# Patient Record
Sex: MALE | Race: WHITE | NOT HISPANIC OR LATINO | Employment: OTHER | URBAN - METROPOLITAN AREA
[De-identification: names, ages, dates, MRNs, and addresses within clinical notes are randomized per-mention and may not be internally consistent; named-entity substitution may affect disease eponyms.]

---

## 2018-04-27 ENCOUNTER — TELEPHONE (OUTPATIENT)
Dept: SCHEDULING | Facility: CLINIC | Age: 73
End: 2018-04-27

## 2018-04-27 NOTE — TELEPHONE ENCOUNTER
Pt calling to see if he needs to have labs done before his appt on 5/15? Please call pt to make aware.

## 2018-05-01 ENCOUNTER — APPOINTMENT (OUTPATIENT)
Dept: LAB | Facility: CLINIC | Age: 73
End: 2018-05-01
Attending: INTERNAL MEDICINE
Payer: MEDICARE

## 2018-05-01 ENCOUNTER — TRANSCRIBE ORDERS (OUTPATIENT)
Dept: LAB | Facility: CLINIC | Age: 73
End: 2018-05-01

## 2018-05-01 DIAGNOSIS — E78.2 MIXED HYPERLIPIDEMIA: Primary | ICD-10-CM

## 2018-05-01 DIAGNOSIS — E78.2 MIXED HYPERLIPIDEMIA: ICD-10-CM

## 2018-05-01 LAB
CHOLEST SERPL-MCNC: 197 MG/DL
HDLC SERPL-MCNC: 76 MG/DL
HDLC SERPL: 2.6 {RATIO}
LDLC SERPL CALC-MCNC: 107 MG/DL
NONHDLC SERPL-MCNC: 121 MG/DL
TRIGL SERPL-MCNC: 70 MG/DL (ref 30–149)

## 2018-05-01 PROCEDURE — 80061 LIPID PANEL: CPT

## 2018-05-01 PROCEDURE — 36415 COLL VENOUS BLD VENIPUNCTURE: CPT

## 2018-05-14 PROBLEM — I10 HTN (HYPERTENSION) WITH GOAL TO BE DETERMINED: Status: ACTIVE | Noted: 2018-05-14

## 2018-05-14 PROBLEM — Z15.89 MTHFR GENE MUTATION: Status: ACTIVE | Noted: 2018-05-14

## 2018-05-14 PROBLEM — E78.00 HYPERCHOLESTEROLEMIA: Status: ACTIVE | Noted: 2018-05-14

## 2018-05-14 PROBLEM — E66.9 OBESITY: Status: ACTIVE | Noted: 2018-05-14

## 2018-05-14 PROBLEM — I25.10 CAD (CORONARY ARTERY DISEASE): Status: ACTIVE | Noted: 2018-05-14

## 2018-05-14 PROBLEM — E88.819 INSULIN RESISTANCE: Status: ACTIVE | Noted: 2018-05-14

## 2018-05-14 NOTE — PROGRESS NOTES
Patient MRN: [id number]  Date: 2017  Description: Prog Notes ([facility]) Cardiology  Category: Progress Notes ([facility])   Provider ID: [id number]  Practice ID: 0001  [place] ID: [id number]    5/15/2018      Rai Lopez M.D.  [address] [place] [address]  [place] [address]    Re:  CARROLL LITTLE  :  1945    Dear Rai:    It was my pleasure to see Carroll Little in the cardiovascular office today.  As you know, he self-referred for cardiovascular risk assessment.  The patient carries a history of subclinical coronary artery disease with an Agatston score of 648 with two vessel involvement, hypertension and polygenic hypercholesterolemia.    Clinically, he has been doing well.  He denies any chest pain, shortness of breath or palpitations.    We reviewed his prevention program in detail.  His last advanced lipid panel revealed a total cholesterol of 197  HDL 76 triglycerides 70.  Presently, he is on Crestor 10 mg Monday, Wednesday, Friday.      His advanced lipid panel also shows an elevated uric acid level at 80.  He does not have gout.  His homocystine levels are high and his RBC folate levels are low.  He will begin L5 methyl folate with B-complex.  He will continue on with omega-3 fish oil.    There is evidence of insulin resistance.  We recommended a weight reduction diet and his weight is down 12 pounds to 229 pounds.  His goal weight is 220 pounds.  We discussed a low-carbohydrate, low-saturated fat Mediterranean Diet and to cut back on his carbohydrates, both sugar as well as saturated fats.    PAST MEDICAL HISTORY:  Subclinical coronary artery disease, coronary calcium score 648 with two vessel involvement including the LAD and RCA, 2016, dyslipidemia consistent with mild polygenic hypercholesterolemia, total cholesterol 198, , HDL 65, triglycerides 52, apoB 92, LDL-P 1496, normal LP(a), MTHFR polymorphism with hyperhomocystinemia, endothelial dysfunction, insulin  resistance, hypertension, abnormal EKG with incomplete right bundle branch block, frequent PVC's, subdural hematoma in  complicated by paroxysmal atrial fibrillation.  Stress echo was performed in 2016 which was negative for myocardial ischemia or scar, normal left ventricular size, preserved systolic function.  He has a slightly dilated aortic root at 4.4 cm.    PAST SURGICAL HISTORY:  Neurosurgery for subdural hematoma, appendectomy.    CURRENT MEDICATIONS:   Current Outpatient Prescriptions:   •  cholecalciferol, vitamin D3, (VITAMIN D3 ORAL), Take 1 tablet by mouth daily. Vitamin D Supreme, Disp: , Rfl:   •  coenzyme Q10 (CO Q-10) 10 mg capsule, Take 200 mg by mouth daily.  , Disp: , Rfl:   •  latanoprost (XALATAN) 0.005 % ophthalmic solution, Administer 1 drop into affected eye(s) nightly. Into left eye, Disp: , Rfl: 6  •  lisinopril-hydrochlorothiazide (PRINZIDE,ZESTORETIC) 20-12.5 mg per tablet, Take 1 tablet by mouth daily., Disp: , Rfl:   •  metoprolol succinate XL (TOPROL-XL) 100 mg 24 hr tablet, Take 100 mg by mouth daily. Take 0.5 tablet daily, Disp: , Rfl: 3  •  omega-3/dha/epa/fish oil (OMEGA-3 FISH OIL ORAL), Take 1 capsule by mouth daily. Omeg Q-Plus, Disp: , Rfl:   •  rosuvastatin (CRESTOR) 10 mg tablet, Take 1 tablet (10 mg total) by mouth daily., Disp: 90 tablet, Rfl: 1      SUPPLEMENTS:  Coenzyme-Q10 100 mg daily, vitamin-D 4966-7026 international units daily, omega-3 fish oil 1000 mg daily, multiple vitamin.    ALLERGIES:  No known drug allergies.    FAMILY HISTORY:  Father  young at age 55 of lung cancer.  Mother has a pacemaker.    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  His wife's name is Jaye.  They have three children.  He has seven grandchildren.  He smoked 38 years ago for five years.  He rarely drinks alcohol.  He has an active lifestyle.  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    REVIEW OF SYSTEMS:  The patient denies any chest pain, shortness of breath or palpitations.    PHYSICAL EXAMINATION:    Weight:  229.  Blood pressure: 140/82  Heart rate:  72.  Respiratory rate:  12.  Temperature: Afebrile.  HEENT:  Unremarkable.  Neck:  Supple, no JVD.  Lungs:  Clear to auscultation and percussion.  Cardiac:  Regular rate and rhythm.  Abdomen:  Soft, bowel sounds present, no organomegaly.  Extremities:  No edema, pulses intact.  Skin:  Warm and dry.  Neuro:  Alert and oriented X 3.    LABORATORY DATA:      EKG:  Normal sinus rhythm, normal EKG.      Coronary calcium score:  648 with two vessel involvement.      Stress echo was negative for myocardial ischemia or scar, normal left ventricular size, preserved systolic function.      Lipid panel:  Total cholesterol 197  HDL 76 triglycerides 70.    IMPRESSIONS/RECOMMENDATIONS:  1. Coronary artery disease.  The patient's stress test was negative for ischemia.  Continue baby aspirin 81 mg daily.  2. Hypertension.  Continue lisinopril, hydrochlorothiazide and metoprolol.  3. Polygenic hypercholesterolemia.  The patient will increase his Crestor to 10 mg Monday, Wednesday, Friday.  Our goal is an apoB less than 80 and an LDL below 100.  4. MTHFR polymorphism.  Begin B-complex with L5 methyl folate.  5. Insulin resistance.  The patient needs to lose 15 pounds.  He needs to cut back on his carbohydrates.  6. Obesity.  The patient needs to lose 15 pounds.  We discussed a low-carbohydrate, low-saturated fat Mediterranean Diet and an exercise walking program.    Summary: Tobias is making progress with his weight management program needs to lose another 10 pounds.  With Crestor 10 mg Monday Wednesday Friday.  We will see him back in the office in 6 months.    This was a 30 minute patient encounter with greater than 50% time spent in care coordination and counseling.    Sincerely,    Manuel Nichole MD  5/15/2018      cc: Rai ADHIKARI  ARELI Lopez., [address] [place] [address], [place] [address], FAX: [phone]

## 2018-05-15 ENCOUNTER — OFFICE VISIT (OUTPATIENT)
Dept: CARDIOLOGY | Facility: CLINIC | Age: 73
End: 2018-05-15
Attending: INTERNAL MEDICINE
Payer: MEDICARE

## 2018-05-15 VITALS
DIASTOLIC BLOOD PRESSURE: 82 MMHG | BODY MASS INDEX: 32.06 KG/M2 | HEIGHT: 71 IN | WEIGHT: 229 LBS | SYSTOLIC BLOOD PRESSURE: 140 MMHG

## 2018-05-15 DIAGNOSIS — I25.10 CORONARY ARTERY DISEASE INVOLVING NATIVE CORONARY ARTERY OF NATIVE HEART WITHOUT ANGINA PECTORIS: Primary | ICD-10-CM

## 2018-05-15 DIAGNOSIS — E78.00 HYPERCHOLESTEROLEMIA: ICD-10-CM

## 2018-05-15 DIAGNOSIS — E88.819 INSULIN RESISTANCE: ICD-10-CM

## 2018-05-15 DIAGNOSIS — I10 HTN (HYPERTENSION) WITH GOAL TO BE DETERMINED: ICD-10-CM

## 2018-05-15 PROCEDURE — 99214 OFFICE O/P EST MOD 30 MIN: CPT | Performed by: INTERNAL MEDICINE

## 2018-05-15 RX ORDER — ROSUVASTATIN CALCIUM 5 MG/1
5 TABLET, COATED ORAL DAILY
Qty: 90 TABLET | Refills: 3 | Status: CANCELLED | OUTPATIENT
Start: 2018-05-15 | End: 2019-05-15

## 2018-05-15 RX ORDER — METOPROLOL SUCCINATE 100 MG/1
100 TABLET, EXTENDED RELEASE ORAL DAILY
Refills: 3 | COMMUNITY
Start: 2018-05-06 | End: 2024-12-16 | Stop reason: SDUPTHER

## 2018-05-15 RX ORDER — LISINOPRIL AND HYDROCHLOROTHIAZIDE 12.5; 2 MG/1; MG/1
1 TABLET ORAL DAILY
COMMUNITY
Start: 2017-11-09 | End: 2019-02-27 | Stop reason: ALTCHOICE

## 2018-05-15 RX ORDER — ROSUVASTATIN CALCIUM 5 MG/1
5 TABLET, COATED ORAL DAILY
COMMUNITY
Start: 2016-04-19 | End: 2018-05-15

## 2018-05-15 RX ORDER — ACETAMINOPHEN 160 MG/5ML
200 SUSPENSION, ORAL (FINAL DOSE FORM) ORAL DAILY
COMMUNITY

## 2018-05-15 RX ORDER — ROSUVASTATIN CALCIUM 10 MG/1
10 TABLET, COATED ORAL DAILY
Qty: 90 TABLET | Refills: 1 | Status: SHIPPED | OUTPATIENT
Start: 2018-05-15 | End: 2018-10-11 | Stop reason: SDUPTHER

## 2018-05-15 RX ORDER — LATANOPROST 50 UG/ML
1 SOLUTION/ DROPS OPHTHALMIC NIGHTLY
Refills: 6 | COMMUNITY
Start: 2018-04-02

## 2018-05-15 RX ORDER — ROSUVASTATIN CALCIUM 5 MG/1
10 TABLET, COATED ORAL DAILY
Qty: 90 TABLET | Refills: 2 | COMMUNITY
Start: 2018-05-15 | End: 2018-05-15

## 2018-06-11 ENCOUNTER — APPOINTMENT (OUTPATIENT)
Dept: LAB | Facility: CLINIC | Age: 73
End: 2018-06-11
Attending: FAMILY MEDICINE
Payer: MEDICARE

## 2018-06-11 ENCOUNTER — TRANSCRIBE ORDERS (OUTPATIENT)
Dept: LAB | Facility: CLINIC | Age: 73
End: 2018-06-11

## 2018-06-11 DIAGNOSIS — R97.20 ELEVATED PROSTATE SPECIFIC ANTIGEN (PSA): ICD-10-CM

## 2018-06-11 DIAGNOSIS — E11.9 TYPE 2 DIABETES MELLITUS WITHOUT COMPLICATIONS (CMS/HCC): ICD-10-CM

## 2018-06-11 DIAGNOSIS — E55.0 RICKETS, ACTIVE: ICD-10-CM

## 2018-06-11 DIAGNOSIS — D64.9 ANEMIA: ICD-10-CM

## 2018-06-11 DIAGNOSIS — E78.00 PURE HYPERCHOLESTEROLEMIA: ICD-10-CM

## 2018-06-11 DIAGNOSIS — Z79.01 LONG TERM CURRENT USE OF ANTICOAGULANT: ICD-10-CM

## 2018-06-11 DIAGNOSIS — D64.9 ANEMIA: Primary | ICD-10-CM

## 2018-06-11 LAB
25(OH)D3 SERPL-MCNC: 71 NG/ML (ref 30–100)
ALBUMIN SERPL-MCNC: 4.1 G/DL (ref 3.4–5)
ALP SERPL-CCNC: 53 IU/L (ref 35–126)
ALT SERPL-CCNC: 28 IU/L (ref 16–63)
ANION GAP SERPL CALC-SCNC: 8 MEQ/L (ref 3–15)
APTT PPP: 25 SEC. (ref 23–35)
AST SERPL-CCNC: 21 IU/L (ref 15–41)
BASOPHILS # BLD: 0 K/UL (ref 0.01–0.1)
BASOPHILS NFR BLD: 0 %
BILIRUB SERPL-MCNC: 0.8 MG/DL (ref 0.3–1.2)
BUN SERPL-MCNC: 19 MG/DL (ref 8–20)
CALCIUM SERPL-MCNC: 9.1 MG/DL (ref 8.9–10.3)
CHLORIDE SERPL-SCNC: 101 MMOL/L (ref 98–109)
CHOLEST SERPL-MCNC: 180 MG/DL
CO2 SERPL-SCNC: 29 MMOL/L (ref 22–32)
CREAT SERPL-MCNC: 1.1 MG/DL (ref 0.8–1.3)
DACRYOCYTES BLD QL SMEAR: ABNORMAL
DIFFERENTIAL METHOD BLD: ABNORMAL
EOSINOPHIL # BLD: 0.08 K/UL (ref 0.04–0.54)
EOSINOPHIL NFR BLD: 1 %
ERYTHROCYTE [DISTWIDTH] IN BLOOD BY AUTOMATED COUNT: NORMAL % (ref 11.6–14.4)
EST. AVERAGE GLUCOSE BLD GHB EST-MCNC: 123 MG/DL
FERRITIN SERPL-MCNC: 205 NG/ML (ref 24–250)
GFR SERPL CREATININE-BSD FRML MDRD: >60 ML/MIN/1.73M*2
GLUCOSE SERPL-MCNC: 100 MG/DL (ref 70–99)
HBA1C MFR BLD HPLC: 5.9 %
HCT VFR BLDCO AUTO: NORMAL % (ref 40–51)
HDLC SERPL-MCNC: 75 MG/DL
HDLC SERPL: 2.4 {RATIO}
HGB BLD-MCNC: 14.7 G/DL (ref 13.7–17.5)
LDLC SERPL CALC-MCNC: 88 MG/DL
LYMPHOCYTES # BLD: 1.78 K/UL (ref 1.2–3.5)
LYMPHOCYTES NFR BLD: 23 %
MCH RBC QN AUTO: NORMAL PG (ref 28–33.2)
MCHC RBC AUTO-ENTMCNC: NORMAL G/DL (ref 32.2–36.5)
MCV RBC AUTO: NORMAL FL (ref 83–98)
MONOCYTES # BLD: 0.54 K/UL (ref 0.3–1)
MONOCYTES NFR BLD: 7 %
NEUTS BAND # BLD: 5.33 K/UL (ref 1.7–7)
NEUTS SEG NFR BLD: 69 %
NONHDLC SERPL-MCNC: 105 MG/DL
NRBC BLD MANUAL-RTO: 1 /100 WBC
PDW BLD AUTO: NORMAL FL (ref 9.4–12.4)
PLAT MORPH BLD: NORMAL
PLATELET # BLD AUTO: NORMAL K/UL (ref 150–350)
PLATELET # BLD EST: ABNORMAL 10*3/UL
POTASSIUM SERPL-SCNC: 3.7 MMOL/L (ref 3.6–5.1)
PROT SERPL-MCNC: 6.1 G/DL (ref 6–8.2)
PSA SERPL-MCNC: 0.59 NG/ML
RBC # BLD AUTO: NORMAL M/UL (ref 4.5–5.8)
SODIUM SERPL-SCNC: 138 MMOL/L (ref 136–144)
TRIGL SERPL-MCNC: 85 MG/DL (ref 30–149)
WBC # BLD AUTO: 7.73 K/UL (ref 3.8–10.5)

## 2018-06-11 PROCEDURE — 82728 ASSAY OF FERRITIN: CPT

## 2018-06-11 PROCEDURE — 85730 THROMBOPLASTIN TIME PARTIAL: CPT

## 2018-06-11 PROCEDURE — 80053 COMPREHEN METABOLIC PANEL: CPT

## 2018-06-11 PROCEDURE — 82306 VITAMIN D 25 HYDROXY: CPT

## 2018-06-11 PROCEDURE — 85025 COMPLETE CBC W/AUTO DIFF WBC: CPT

## 2018-06-11 PROCEDURE — 84153 ASSAY OF PSA TOTAL: CPT

## 2018-06-11 PROCEDURE — 36415 COLL VENOUS BLD VENIPUNCTURE: CPT

## 2018-06-11 PROCEDURE — 80061 LIPID PANEL: CPT

## 2018-06-11 PROCEDURE — 83036 HEMOGLOBIN GLYCOSYLATED A1C: CPT

## 2018-06-13 ENCOUNTER — TELEPHONE (OUTPATIENT)
Dept: SCHEDULING | Facility: CLINIC | Age: 73
End: 2018-06-13

## 2018-06-13 NOTE — TELEPHONE ENCOUNTER
Dr Nichole pat. Wife called stating he went to pcp due to bruising and had labs drawn by Dr Lopez 10 days ago.. Wife states CBC could not be read due to cold agglutitinin. Pcp is away until  next week. Can Dr Nichole review the 6/11 labs and  calll her to discuss.

## 2018-06-15 ENCOUNTER — TRANSCRIBE ORDERS (OUTPATIENT)
Dept: LAB | Facility: HOSPITAL | Age: 73
End: 2018-06-15

## 2018-06-15 ENCOUNTER — APPOINTMENT (OUTPATIENT)
Dept: LAB | Facility: HOSPITAL | Age: 73
End: 2018-06-15
Attending: INTERNAL MEDICINE
Payer: MEDICARE

## 2018-06-15 DIAGNOSIS — I25.10 CORONARY ARTERY DISEASE INVOLVING NATIVE CORONARY ARTERY OF NATIVE HEART WITHOUT ANGINA PECTORIS: ICD-10-CM

## 2018-06-15 DIAGNOSIS — D64.9 ANEMIA: ICD-10-CM

## 2018-06-15 DIAGNOSIS — D64.9 ANEMIA: Primary | ICD-10-CM

## 2018-06-15 DIAGNOSIS — E78.00 HYPERCHOLESTEROLEMIA: ICD-10-CM

## 2018-06-15 LAB
BASOPHILS # BLD: 0.03 K/UL (ref 0.01–0.1)
BASOPHILS NFR BLD: 0.4 %
BLOOD GROUP ANTIBODIES SERPL ELUTION: NEGATIVE
DAT IGG-SP REAG RBC QL: POSITIVE
DAT POLY-SP REAG RBC QL: POSITIVE
DAT, ANTI C3D COMPLEMENT: POSITIVE
DIFFERENTIAL METHOD BLD: NORMAL
EOSINOPHIL # BLD: 0.09 K/UL (ref 0.04–0.54)
EOSINOPHIL NFR BLD: 1.2 %
ERYTHROCYTE [DISTWIDTH] IN BLOOD BY AUTOMATED COUNT: NORMAL % (ref 11.6–14.4)
HCT VFR BLDCO AUTO: NORMAL % (ref 40–51)
HGB BLD-MCNC: 14.7 G/DL (ref 13.7–17.5)
IMM GRANULOCYTES # BLD AUTO: 0.03 K/UL (ref 0–0.08)
IMM GRANULOCYTES NFR BLD AUTO: 0.4 %
LABORATORY COMMENT REPORT: NORMAL
LYMPHOCYTES # BLD: 2.42 K/UL (ref 1.2–3.5)
LYMPHOCYTES NFR BLD: 32.3 %
MACROCYTES BLD QL SMEAR: NORMAL
MCH RBC QN AUTO: NORMAL PG (ref 28–33.2)
MCHC RBC AUTO-ENTMCNC: NORMAL G/DL (ref 32.2–36.5)
MCV RBC AUTO: NORMAL FL (ref 83–98)
MONOCYTES # BLD: 0.61 K/UL (ref 0.3–1)
MONOCYTES NFR BLD: 8.1 %
NEUTROPHILS # BLD: 4.31 K/UL (ref 1.7–7)
NEUTS SEG NFR BLD: 57.6 %
NRBC BLD-RTO: 0 %
PDW BLD AUTO: 10.7 FL (ref 9.4–12.4)
PLAT MORPH BLD: NORMAL
PLATELET # BLD AUTO: 164 K/UL (ref 150–350)
PLATELET # BLD EST: NORMAL 10*3/UL
RBC # BLD AUTO: NORMAL M/UL (ref 4.5–5.8)
RBC MORPH BLD: NORMAL
RETICS #: 0.03 M/UL (ref 0–0.12)
RETICS/RBC NFR: 1.6 % (ref 0.6–2.8)
WBC # BLD AUTO: 7.49 K/UL (ref 3.8–10.5)

## 2018-06-15 PROCEDURE — 86162 COMPLEMENT TOTAL (CH50): CPT

## 2018-06-15 PROCEDURE — 86645 CMV ANTIBODY IGM: CPT

## 2018-06-15 PROCEDURE — 84165 PROTEIN E-PHORESIS SERUM: CPT

## 2018-06-15 PROCEDURE — 88184 FLOWCYTOMETRY/ TC 1 MARKER: CPT

## 2018-06-15 PROCEDURE — 86738 MYCOPLASMA ANTIBODY: CPT

## 2018-06-15 PROCEDURE — 86160 COMPLEMENT ANTIGEN: CPT

## 2018-06-15 PROCEDURE — 86665 EPSTEIN-BARR CAPSID VCA: CPT

## 2018-06-15 PROCEDURE — 86870 RBC ANTIBODY IDENTIFICATION: CPT

## 2018-06-15 PROCEDURE — 86644 CMV ANTIBODY: CPT

## 2018-06-15 PROCEDURE — 36415 COLL VENOUS BLD VENIPUNCTURE: CPT

## 2018-06-15 PROCEDURE — 85025 COMPLETE CBC W/AUTO DIFF WBC: CPT

## 2018-06-15 PROCEDURE — 85045 AUTOMATED RETICULOCYTE COUNT: CPT

## 2018-06-15 PROCEDURE — 86880 COOMBS TEST DIRECT: CPT | Mod: 59

## 2018-06-15 NOTE — TELEPHONE ENCOUNTER
Pt states that Dr. Nichole left  for pt stating that he will call this morning re: blood work. Pt would like Dr. Nichole to call [phone] due to his phone not working. Please advise.

## 2018-06-15 NOTE — TELEPHONE ENCOUNTER
Pts wife says they missed a call from Dr Nichole last night.  Please call pts wife back. She is very anxious about lab results

## 2018-06-18 LAB
ALBUMIN MFR UR ELPH: 56.1 % (ref 48–62)
ALBUMIN SERPL ELPH-MCNC: 3.42 G/DL (ref 3.2–4.5)
ALBUMIN/GLOB SERPL: 1.3 {RATIO} (ref 1.1–2.1)
ALPHA1 GLOB MFR SERPL ELPH: 3 % (ref 2.1–4.8)
ALPHA1 GLOB SERPL ELPH-MCNC: 0.18 G/DL (ref 0.15–0.32)
ALPHA2 GLOB MFR UR ELPH: 16.3 % (ref 9.7–16.2)
ALPHA2 GLOB SERPL ELPH-MCNC: 1 G/DL (ref 0.7–1.1)
B-GLOBULIN SERPL ELPH-MCNC: 0.83 G/DL (ref 0.73–1.3)
BETA1 GLOB MFR UR ELPH: 13.7 % (ref 10.8–17.9)
C3 SERPL-MCNC: 117 MG/DL (ref 80–200)
C4 SERPL-MCNC: 30 MG/DL (ref 16–45)
CH50 SERPL-ACNC: >60 U/ML (ref 31–60)
GAMMA GLOB MFR UR ELPH: 10.9 % (ref 9–23)
GAMMA GLOB SERPL ELPH-MCNC: 0.67 G/DL (ref 0.6–1.6)
HETEROPH AB SER QL LA: NEGATIVE
M PNEUMO IGM SER QL: NEGATIVE
PROT PATTERN SERPL ELPH-IMP: NORMAL
PROT SERPL-MCNC: 6.1 G/DL (ref 6–8.2)

## 2018-06-19 LAB
EBV NA 1 IGG SER-ACNC: 6.32
EBV VCA IGG SER IA-ACNC: 4.39
EBV VCA IGM SER IA-ACNC: 0.05
INTERPRETATION: ABNORMAL

## 2018-06-20 LAB
CASE RPRT: NORMAL
CLINICAL INFO: NORMAL
CMV IGG SERPL IA-ACNC: 5.58
CMV IGM SERPL IA-ACNC: 0.07 ISR
PATH INTERP SPEC-IMP: NORMAL

## 2018-06-22 LAB — M PNEUMO IGG SER IA-ACNC: 1.13

## 2018-06-29 ENCOUNTER — TRANSCRIBE ORDERS (OUTPATIENT)
Dept: SCHEDULING | Age: 73
End: 2018-06-29

## 2018-06-29 DIAGNOSIS — R79.9 ABNORMAL FINDING OF BLOOD CHEMISTRY: Primary | ICD-10-CM

## 2018-08-20 ENCOUNTER — OFFICE VISIT (OUTPATIENT)
Dept: FAMILY MEDICINE | Facility: CLINIC | Age: 73
End: 2018-08-20
Payer: MEDICARE

## 2018-08-20 VITALS
HEIGHT: 71 IN | HEART RATE: 68 BPM | SYSTOLIC BLOOD PRESSURE: 150 MMHG | TEMPERATURE: 98 F | BODY MASS INDEX: 32.2 KG/M2 | DIASTOLIC BLOOD PRESSURE: 82 MMHG | WEIGHT: 230 LBS | RESPIRATION RATE: 16 BRPM

## 2018-08-20 DIAGNOSIS — E78.00 HYPERCHOLESTEROLEMIA: Primary | ICD-10-CM

## 2018-08-20 DIAGNOSIS — D59.12 COLD AGGLUTININ DISEASE (CMS/HCC): ICD-10-CM

## 2018-08-20 DIAGNOSIS — I10 ESSENTIAL HYPERTENSION: ICD-10-CM

## 2018-08-20 DIAGNOSIS — R23.3 BRUISES EASILY: ICD-10-CM

## 2018-08-20 PROBLEM — H40.89 OTHER SPECIFIED GLAUCOMA: Status: ACTIVE | Noted: 2018-08-20

## 2018-08-20 PROBLEM — Z87.828 HISTORY OF TRAUMATIC SUBDURAL HEMATOMA: Status: ACTIVE | Noted: 2018-08-20

## 2018-08-20 PROCEDURE — 99203 OFFICE O/P NEW LOW 30 MIN: CPT | Performed by: FAMILY MEDICINE

## 2018-08-20 NOTE — PROGRESS NOTES
Patient ID: Tobias Little is a 72 y.o. male.        Subjective     Establish. 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 Has had some petechial bruising on forearms past 3-4 months. Takes fish oil (stopped last week) Has lost some weight intentional. Concern abotu some loss of muscle mass. Had ca score 2016. Score 648 in RCA and LAD. No trouble exercising. Has knee arthritis. planning injections this week. Has been taking fish oil. Bruising on arms. Takes crestor 3 day/week. Eye doc ordering brain scan due to bilateral visual change noted on exam.           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 Vitamin D Supreme, Disp: , Rfl:   •  coenzyme Q10 (CO Q-10) 10 mg capsule, Take 200 mg by mouth daily.  , Disp: , Rfl:   •  latanoprost (XALATAN) 0.005 % ophthalmic solution, Administer 1 drop into affected eye(s) nightly. Into left eye, Disp: , Rfl: 6  •  lisinopril-hydrochlorothiazide (PRINZIDE,ZESTORETIC) 20-12.5 mg per tablet, Take 1 tablet by mouth daily., Disp: , Rfl:   •  metoprolol succinate XL (TOPROL-XL) 100 mg 24 hr tablet, Take 100 mg by mouth daily. Take 0.5 tablet daily, Disp: , Rfl: 3  •  rosuvastatin (CRESTOR) 10 mg tablet, Take 1 tablet (10 mg total) by mouth daily., Disp: 90 tablet, Rfl: 1    Allergies   Allergen Reactions   • No Known Allergies      Review of  "Systems   Constitutional: Negative for unexpected weight change.   Respiratory: Negative for chest tightness and shortness of breath.    Cardiovascular: Negative for chest pain and leg swelling.   Gastrointestinal: Negative for blood in stool.   Hematological: Bruises/bleeds easily.         Objective     Vitals:    08/20/18 0949   BP: (!) 150/82   BP Location: Left upper arm   Patient Position: Sitting   Pulse: 68   Resp: 16   Temp: 36.7 °C (98 °F)   TempSrc: Oral   Weight: 104 kg (230 lb)   Height: 1.803 m (5' 11\")     Physical Exam   Constitutional: He appears well-developed and well-nourished.   HENT:   Head: Normocephalic.   Right Ear: Tympanic membrane and external ear normal.   Left Ear: Tympanic membrane and external ear normal.   Mouth/Throat: Oropharynx is clear and moist.   Eyes: Conjunctivae are normal. Pupils are equal, round, and reactive to light.   Neck: Normal range of motion. No thyromegaly present.   Cardiovascular: Normal rate, regular rhythm, normal heart sounds and intact distal pulses.    No murmur heard.  Pulmonary/Chest: Effort normal and breath sounds normal. He has no wheezes. He has no rales.   Abdominal: Soft. Bowel sounds are normal. He exhibits no mass. There is no tenderness. No hernia.   Musculoskeletal: He exhibits no edema.   Lymphadenopathy:     He has no cervical adenopathy.   Neurological: Coordination normal.   Skin: Skin is warm. Capillary refill takes less than 2 seconds. No erythema.   Petechial bruising on bilateral forearms   Psychiatric: He has a normal mood and affect. His behavior is normal.       Assessment/Plan       Problem List Items Addressed This Visit        Other    Essential hypertension     Mildly elevated today.  This was his first visit have asked him to return for a wellness exam in the near future and will recheck his blood pressure.         Hypercholesterolemia - Primary     He brought some recent records with him to include the calcium score test, colon " screening and recent labs.  The cholesterol is controlled.         Cold agglutinin disease (CMS/HCC) (HCC)    Bruises easily     He has recently stopped fish oil.  We will see if this improves in the coming weeks.  He sees hematology regularly because of the cold agglutinin disease.  Is a very mildly low hematocrit.

## 2018-08-23 PROBLEM — R23.3 BRUISES EASILY: Status: ACTIVE | Noted: 2018-08-23

## 2018-08-23 ASSESSMENT — ENCOUNTER SYMPTOMS
CHEST TIGHTNESS: 0
SHORTNESS OF BREATH: 0
BRUISES/BLEEDS EASILY: 1
BLOOD IN STOOL: 0
UNEXPECTED WEIGHT CHANGE: 0

## 2018-08-23 NOTE — ASSESSMENT & PLAN NOTE
Mildly elevated today.  This was his first visit have asked him to return for a wellness exam in the near future and will recheck his blood pressure.

## 2018-08-23 NOTE — ASSESSMENT & PLAN NOTE
He has recently stopped fish oil.  We will see if this improves in the coming weeks.  He sees hematology regularly because of the cold agglutinin disease.  Is a very mildly low hematocrit.

## 2018-08-23 NOTE — ASSESSMENT & PLAN NOTE
He brought some recent records with him to include the calcium score test, colon screening and recent labs.  The cholesterol is controlled.

## 2018-10-11 DIAGNOSIS — E78.00 HYPERCHOLESTEROLEMIA: ICD-10-CM

## 2018-10-11 RX ORDER — ROSUVASTATIN CALCIUM 10 MG/1
10 TABLET, COATED ORAL DAILY
Qty: 90 TABLET | Refills: 1 | Status: SHIPPED | OUTPATIENT
Start: 2018-10-11 | End: 2019-05-01 | Stop reason: SDUPTHER

## 2018-11-09 ENCOUNTER — TRANSCRIBE ORDERS (OUTPATIENT)
Dept: LAB | Facility: CLINIC | Age: 73
End: 2018-11-09

## 2018-11-09 ENCOUNTER — APPOINTMENT (OUTPATIENT)
Dept: LAB | Facility: CLINIC | Age: 73
End: 2018-11-09
Attending: INTERNAL MEDICINE
Payer: MEDICARE

## 2018-11-09 DIAGNOSIS — I25.10 ATHEROSCLEROTIC HEART DISEASE OF NATIVE CORONARY ARTERY WITHOUT ANGINA PECTORIS: Primary | ICD-10-CM

## 2018-11-09 DIAGNOSIS — I25.10 ATHEROSCLEROTIC HEART DISEASE OF NATIVE CORONARY ARTERY WITHOUT ANGINA PECTORIS: ICD-10-CM

## 2018-11-09 DIAGNOSIS — E78.00 PURE HYPERCHOLESTEROLEMIA: ICD-10-CM

## 2018-11-09 LAB
ALBUMIN SERPL-MCNC: 4 G/DL (ref 3.4–5)
ALP SERPL-CCNC: 61 IU/L (ref 35–126)
ALT SERPL-CCNC: 27 IU/L (ref 16–63)
ANION GAP SERPL CALC-SCNC: 12 MEQ/L (ref 3–15)
ANISOCYTOSIS BLD QL SMEAR: NORMAL
AST SERPL-CCNC: 24 IU/L (ref 15–41)
BASOPHILS # BLD: 0.03 K/UL (ref 0.01–0.1)
BASOPHILS NFR BLD: 0.5 %
BILIRUB SERPL-MCNC: 0.6 MG/DL (ref 0.3–1.2)
BUN SERPL-MCNC: 19 MG/DL (ref 8–20)
CALCIUM SERPL-MCNC: 9.3 MG/DL (ref 8.9–10.3)
CHLORIDE SERPL-SCNC: 98 MEQ/L (ref 98–109)
CHOLEST SERPL-MCNC: 146 MG/DL
CO2 SERPL-SCNC: 27 MEQ/L (ref 22–32)
CREAT SERPL-MCNC: 1.1 MG/DL (ref 0.8–1.3)
DIFFERENTIAL METHOD BLD: NORMAL
EOSINOPHIL # BLD: 0.16 K/UL (ref 0.04–0.54)
EOSINOPHIL NFR BLD: 2.5 %
ERYTHROCYTE [DISTWIDTH] IN BLOOD BY AUTOMATED COUNT: 19.7 % (ref 11.6–14.4)
GFR SERPL CREATININE-BSD FRML MDRD: >60 ML/MIN/1.73M*2
GLUCOSE SERPL-MCNC: 94 MG/DL (ref 70–99)
HCT VFR BLDCO AUTO: ABNORMAL % (ref 40.1–51)
HDLC SERPL-MCNC: 68 MG/DL
HDLC SERPL: 2.1 {RATIO}
HGB BLD-MCNC: 14.2 G/DL (ref 13.7–17.5)
IMM GRANULOCYTES # BLD AUTO: 0.02 K/UL (ref 0–0.08)
IMM GRANULOCYTES NFR BLD AUTO: 0.3 %
LDLC SERPL CALC-MCNC: 65 MG/DL
LYMPHOCYTES # BLD: 2.1 K/UL (ref 1.2–3.5)
LYMPHOCYTES NFR BLD: 32.7 %
MCH RBC QN AUTO: ABNORMAL PG (ref 28–33.2)
MCHC RBC AUTO-ENTMCNC: ABNORMAL G/DL (ref 32.2–36.5)
MCV RBC AUTO: ABNORMAL FL (ref 83–98)
MONOCYTES # BLD: 0.91 K/UL (ref 0.3–1)
MONOCYTES NFR BLD: 14.2 %
NEUTROPHILS # BLD: 3.21 K/UL (ref 1.7–7)
NEUTS SEG NFR BLD: 49.8 %
NONHDLC SERPL-MCNC: 78 MG/DL
NRBC BLD-RTO: 0 %
PDW BLD AUTO: 10.6 FL (ref 9.4–12.4)
PLATELET # BLD AUTO: 172 K/UL (ref 150–350)
PLATELET # BLD EST: NORMAL 10*3/UL
PLATELET CLUMP BLD QL SMEAR: PRESENT
POTASSIUM SERPL-SCNC: 4.4 MEQ/L (ref 3.6–5.1)
PROT SERPL-MCNC: 6.5 G/DL (ref 6–8.2)
RBC # BLD AUTO: ABNORMAL M/UL (ref 4.5–5.8)
SODIUM SERPL-SCNC: 137 MEQ/L (ref 136–144)
TRIGL SERPL-MCNC: 65 MG/DL (ref 30–149)
WBC # BLD AUTO: 6.43 K/UL (ref 3.8–10.5)

## 2018-11-09 PROCEDURE — 85025 COMPLETE CBC W/AUTO DIFF WBC: CPT

## 2018-11-09 PROCEDURE — 80061 LIPID PANEL: CPT

## 2018-11-09 PROCEDURE — 36415 COLL VENOUS BLD VENIPUNCTURE: CPT

## 2018-11-09 PROCEDURE — 80053 COMPREHEN METABOLIC PANEL: CPT

## 2018-12-03 ENCOUNTER — TELEPHONE (OUTPATIENT)
Dept: SCHEDULING | Facility: CLINIC | Age: 73
End: 2018-12-03

## 2018-12-03 NOTE — TELEPHONE ENCOUNTER
Next Day Appt Cxl'd (12/4/18 at 9:00AM in [facility]). Pt will call back to reschedule.

## 2019-02-20 ENCOUNTER — TELEPHONE (OUTPATIENT)
Dept: SCHEDULING | Facility: CLINIC | Age: 74
End: 2019-02-20

## 2019-02-20 NOTE — TELEPHONE ENCOUNTER
JU Cee called to inform the office that while Tobias was having surgery on his knee @ [facility]- he experienced Superventricular Tachycardia.   They were advised to see Dr Nichole upon discharge.   Appt was made 2/27@3213

## 2019-02-21 ENCOUNTER — TELEPHONE (OUTPATIENT)
Dept: SCHEDULING | Facility: CLINIC | Age: 74
End: 2019-02-21

## 2019-02-21 NOTE — TELEPHONE ENCOUNTER
Spoke with Juli, increase toprol to extra 50mg in am,  Pt has h/o paf will see in office next week

## 2019-02-21 NOTE — TELEPHONE ENCOUNTER
Juli POPE from [facility] patient is inhouse with Zarina Alva to discuss patient prior to dc today. Please call [phone]

## 2019-02-22 NOTE — TELEPHONE ENCOUNTER
Jaye called back, the Drs are trying to schedule an ablation for Monday however Jaye would like to get Dr. Nichole's ok first.   Jaye can be reached @ [phone]

## 2019-02-26 ENCOUNTER — PATIENT OUTREACH (OUTPATIENT)
Dept: CASE MANAGEMENT | Facility: CLINIC | Age: 74
End: 2019-02-26

## 2019-02-26 NOTE — PROGRESS NOTES
Patient MRN: [id number]  Date: 2017  Description: Prog Notes ([facility]) Cardiology  Category: Progress Notes ([facility])   Provider ID: [id number]  Practice ID: [id number]  [place] ID: [id number]    2019      Rai Lopez M.D.  [address] [facility]  [place] [address]    Re:  TOBIAS VALVERDE  :  1945    Dear Rai:    It was my pleasure to see Tobias Valverde in the cardiovascular office today.  As you know, he self-referred for cardiovascular risk assessment.  The patient carries a history of subclinical coronary artery disease with an Agatston score of 648 with two vessel involvement, hypertension and polygenic hypercholesterolemia.    He recently underwent right total knee replacement at the [facility].  This was complicated by postoperative paroxysmal SVT.  He was discharged on flecainide 100 mg twice daily and Toprol- mg daily.  He has had no recurrence.  He is scheduled to undergo Holter monitoring in several weeks.  He is followed by Dr. Obed Carmona.     Clinically, he is doing well.  He denies any chest pain, shortness of breath or palpitations.  His blood pressure is stable off his lisinopril HCTZ .    We reviewed his prevention program in detail.  His last advanced lipid panel revealed a total cholesterol of 146 LDL 65 HDL 68 triglycerides 65.  Presently, he is on Crestor 10 mg daily.    There is evidence of insulin resistance.  We recommended a weight reduction diet and his weight is 235 pounds.  His goal weight is 220 pounds.  We discussed a low-carbohydrate, low-saturated fat Mediterranean Diet and to cut back on his carbohydrates, both sugar as well as saturated fats.    PAST MEDICAL HISTORY:   1.  Subclinical coronary artery disease, coronary calcium score 648 with two vessel involvement including the LAD and RCA, 2016  2.  dyslipidemia consistent with mild polygenic hypercholesterolemia, total cholesterol 198, , HDL 65,  triglycerides 52, apoB 92, LDL-P 1496, normal LP(a)  3.  MTHFR polymorphism with hyperhomocystinemia  4.  endothelial dysfunction  5.  insulin resistance  6.  Hypertension  7.  abnormal EKG with incomplete right bundle branch block, frequent PVC's  8.  subdural hematoma in  complicated by paroxysmal atrial fibrillation.    9. Stress echo was performed in 2016 which was negative for myocardial ischemia or scar, normal left ventricular size, preserved systolic function.  He has a slightly dilated aortic root at 4.4 cm.  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  Postoperative PSVT.    PAST SURGICAL HISTORY:    Neurosurgery for subdural hematoma  appendectomy.  Right total knee replacement 2019    CURRENT MEDICATIONS:     Current Outpatient Prescriptions:   •  cholecalciferol, vitamin D3, (VITAMIN D3 ORAL), Take 1 tablet by mouth daily. Vitamin D Supreme, Disp: , Rfl:   •  coenzyme Q10 (CO Q-10) 10 mg capsule, Take 200 mg by mouth daily.  , Disp: , Rfl:   •  flecainide (TAMBOCOR) 100 mg tablet, Take 1 tablet (100 mg total) by mouth 2 (two) times a day., Disp: 180 tablet, Rfl: 3  •  latanoprost (XALATAN) 0.005 % ophthalmic solution, Administer 1 drop into affected eye(s) nightly. Into left eye, Disp: , Rfl: 6  •  metoprolol succinate XL (TOPROL-XL) 100 mg 24 hr tablet, Take 100 mg by mouth daily. Take 0.5 tablet daily, Disp: , Rfl: 3  •  rosuvastatin (CRESTOR) 10 mg tablet, Take 1 tablet (10 mg total) by mouth daily., Disp: 90 tablet, Rfl: 1        SUPPLEMENTS:  Coenzyme-Q10 100 mg daily, vitamin-D 6341-0653 international units daily, omega-3 fish oil 1000 mg daily, multiple vitamin.    ALLERGIES:  No known drug allergies.    FAMILY HISTORY:  Father  young at age 55 of lung cancer.  Mother has a pacemaker.    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  His wife's name is Jaey.  They have three children.  He has seven grandchildren.  He smoked 38 years ago  for five years.  He rarely drinks alcohol.  He has an active lifestyle.  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    REVIEW OF SYSTEMS: Patient developed PSVT while he was at the [facility] postoperatively after knee replacement surgery.  He was evaluated by electrophysiology and recommended to begin flecainide and Toprol-XL.  He had no recurrence will follow [facility]    PHYSICAL EXAMINATION:    Weight:  235.  Blood pressure: 118/62  Heart rate:  67.  Respiratory rate:  12.  Temperature: Afebrile.  HEENT:  Unremarkable.  Neck:  Supple, no JVD.  Lungs:  Clear to auscultation and percussion.  Cardiac:  Regular rate and rhythm.  Abdomen:  Soft, bowel sounds present, no organomegaly.  Extremities:  No edema, pulses intact.  Skin:  Warm and dry.  Neuro:  Alert and oriented X 3.    LABORATORY DATA:      EKG:  Normal sinus rhythm, incomplete right bundle branch block pattern.    Coronary calcium score:  648 with two vessel involvement.      Stress echo was negative for myocardial ischemia or scar, normal left ventricular size, preserved systolic function.    Myocardial perfusion scan February 2019: Normal    Echocardiogram February 2019: Normal left ventricular size preserved function.  Mild mitral and tricuspid regurgitation.  Ascending aorta measured 4.2 cm.    Lipid panel:  Total cholesterol 146 LDL 65 HDL 68 triglycerides 65.    IMPRESSIONS/RECOMMENDATIONS:  1. Coronary artery disease.  The patient's stress test was negative for ischemia.  Continue baby aspirin 81 mg daily.  2. Hypertension.  Continue Toprol-XL.  He is no longer on lisinopril.  3. Polygenic hypercholesterolemia.  The patient will continue his Crestor  10 mg daily.  Our goal is an apoB less than 80 and an LDL below 100.  4. MTHFR polymorphism.  Continue B-complex with L5 methyl folate.  5. Insulin resistance.  The patient needs to lose 20pounds.  He needs to cut back on his carbohydrates.  6. Obesity.   The patient needs to lose 20 pounds.  We discussed a low-carbohydrate, low-saturated fat Mediterranean Diet and an exercise walking program.  7.          Postoperative SVT -his recent EKG was normal sinus rhythm, is presently on flecainide 100 mg twice daily and Toprol- mg daily.  He is being followed by Dr. Carmona , and the plan is to have a Holter monitor in 4 weeks on the flecainide.    Summary:.  Tobias is doing well after his surgery.  We will see him back in the office in 3 months.  We will follow his treatment of paroxysmal SVT closely with Dr. Carmona    This was a 30 minute patient encounter with greater than 50% time spent in care coordination and counseling.    Sincerely,      Manuel Nichole MD   2/27/2019

## 2019-02-27 ENCOUNTER — OFFICE VISIT (OUTPATIENT)
Dept: CARDIOLOGY | Facility: CLINIC | Age: 74
End: 2019-02-27
Payer: MEDICARE

## 2019-02-27 VITALS
DIASTOLIC BLOOD PRESSURE: 62 MMHG | HEART RATE: 67 BPM | WEIGHT: 235 LBS | OXYGEN SATURATION: 94 % | HEIGHT: 71 IN | SYSTOLIC BLOOD PRESSURE: 118 MMHG | BODY MASS INDEX: 32.9 KG/M2

## 2019-02-27 DIAGNOSIS — I25.10 CORONARY ARTERY DISEASE INVOLVING NATIVE CORONARY ARTERY OF NATIVE HEART WITHOUT ANGINA PECTORIS: Primary | ICD-10-CM

## 2019-02-27 DIAGNOSIS — I47.10 SVT (SUPRAVENTRICULAR TACHYCARDIA) (CMS/HCC): ICD-10-CM

## 2019-02-27 DIAGNOSIS — Z87.828 HISTORY OF TRAUMATIC SUBDURAL HEMATOMA: ICD-10-CM

## 2019-02-27 DIAGNOSIS — I10 ESSENTIAL HYPERTENSION: ICD-10-CM

## 2019-02-27 DIAGNOSIS — E78.00 HYPERCHOLESTEROLEMIA: ICD-10-CM

## 2019-02-27 PROCEDURE — 99214 OFFICE O/P EST MOD 30 MIN: CPT | Performed by: INTERNAL MEDICINE

## 2019-02-27 RX ORDER — FLECAINIDE ACETATE 100 MG/1
100 TABLET ORAL 2 TIMES DAILY
Qty: 180 TABLET | Refills: 3 | COMMUNITY
Start: 2019-02-27 | End: 2019-07-30 | Stop reason: ALTCHOICE

## 2019-02-27 NOTE — LETTER
2019     Sisi Britt MD  [address] [address]    Patient: Tobias Valverde   YOB: 1945   Date of Visit: 2019       Dear Dr. Britt:    Thank you for referring Tobias Valverde to me for evaluation. Below are my notes for this consultation.    If you have questions, please do not hesitate to call me. I look forward to following your patient along with you.         Sincerely,        Manuel Nichole MD        CC: MD Dayanara Leigh Thomas P, MD  2019  1:22 PM  Sign at close encounter  Patient MRN: [id number]  Date: 2017  Description: Prog Notes ([facility]) Cardiology  Category: Progress Notes ([facility])   Provider ID: [id number]  Practice ID: [id number]  [place] ID: [id number]    2019      Rai Lopez M.D.  [address] [place] [address]  DESIRAE Gilbert [address]    Re:  TOBIAS VALVERDE  :  1945    Dear Rai:    It was my pleasure to see Tobias Valverde in the cardiovascular office today.  As you know, he self-referred for cardiovascular risk assessment.  The patient carries a history of subclinical coronary artery disease with an Agatston score of 648 with two vessel involvement, hypertension and polygenic hypercholesterolemia.    He recently underwent right total knee replacement at the [facility].  This was complicated by postoperative paroxysmal SVT.  He was discharged on flecainide 100 mg twice daily and Toprol- mg daily.  He has had no recurrence.  He is scheduled to undergo Holter monitoring in several weeks.  He is followed by Dr. Obed Carmona.     Clinically, he is doing well.  He denies any chest pain, shortness of breath or palpitations.  His blood pressure is stable off his lisinopril HCTZ .    We reviewed his prevention program in detail.  His last advanced lipid panel revealed a total cholesterol of 146 LDL 65 HDL 68 triglycerides 65.  Presently, he is on  Crestor 10 mg daily.    There is evidence of insulin resistance.  We recommended a weight reduction diet and his weight is 235 pounds.  His goal weight is 220 pounds.  We discussed a low-carbohydrate, low-saturated fat Mediterranean Diet and to cut back on his carbohydrates, both sugar as well as saturated fats.    PAST MEDICAL HISTORY:   1.  Subclinical coronary artery disease, coronary calcium score 648 with two vessel involvement including the LAD and RCA, March 2016  2.  dyslipidemia consistent with mild polygenic hypercholesterolemia, total cholesterol 198, , HDL 65, triglycerides 52, apoB 92, LDL-P 1496, normal LP(a)  3.  MTHFR polymorphism with hyperhomocystinemia  4.  endothelial dysfunction  5.  insulin resistance  6.  Hypertension  7.  abnormal EKG with incomplete right bundle branch block, frequent PVC's  8.  subdural hematoma in 2008 complicated by paroxysmal atrial fibrillation.    9. Stress echo was performed in April 2016 which was negative for myocardial ischemia or scar, normal left ventricular size, preserved systolic function.  He has a slightly dilated aortic root at 4.4 cm.  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  Postoperative PSVT.    PAST SURGICAL HISTORY:    Neurosurgery for subdural hematoma  appendectomy.  Right total knee replacement 2/18/2019    CURRENT MEDICATIONS:     Current Outpatient Prescriptions:   •  cholecalciferol, vitamin D3, (VITAMIN D3 ORAL), Take 1 tablet by mouth daily. Vitamin D Supreme, Disp: , Rfl:   •  coenzyme Q10 (CO Q-10) 10 mg capsule, Take 200 mg by mouth daily.  , Disp: , Rfl:   •  flecainide (TAMBOCOR) 100 mg tablet, Take 1 tablet (100 mg total) by mouth 2 (two) times a day., Disp: 180 tablet, Rfl: 3  •  latanoprost (XALATAN) 0.005 % ophthalmic solution, Administer 1 drop into affected eye(s) nightly. Into left eye, Disp: , Rfl: 6  •  metoprolol succinate XL (TOPROL-XL) 100 mg 24 hr tablet, Take 100 mg by mouth daily. Take 0.5 tablet daily, Disp: , Rfl: 3  •   rosuvastatin (CRESTOR) 10 mg tablet, Take 1 tablet (10 mg total) by mouth daily., Disp: 90 tablet, Rfl: 1        SUPPLEMENTS:  Coenzyme-Q10 100 mg daily, vitamin-D 5010-1412 international units daily, omega-3 fish oil 1000 mg daily, multiple vitamin.    ALLERGIES:  No known drug allergies.    FAMILY HISTORY:  Father  young at age 55 of lung cancer.  Mother has a pacemaker.    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  His wife's name is Jaye.  They have three children.  He has seven grandchildren.  He smoked 38 years ago for five years.  He rarely drinks alcohol.  He has an active lifestyle.  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    REVIEW OF SYSTEMS: Patient developed PSVT while he was at the [facility] postoperatively after knee replacement surgery.  He was evaluated by electrophysiology and recommended to begin flecainide and Toprol-XL.  He had no recurrence will follow up at [facility].    PHYSICAL EXAMINATION:    Weight:  235.  Blood pressure: 118/62  Heart rate:  67.  Respiratory rate:  12.  Temperature: Afebrile.  HEENT:  Unremarkable.  Neck:  Supple, no JVD.  Lungs:  Clear to auscultation and percussion.  Cardiac:  Regular rate and rhythm.  Abdomen:  Soft, bowel sounds present, no organomegaly.  Extremities:  No edema, pulses intact.  Skin:  Warm and dry.  Neuro:  Alert and oriented X 3.    LABORATORY DATA:      EKG:  Normal sinus rhythm, incomplete right bundle branch block pattern.    Coronary calcium score:  648 with two vessel involvement.      Stress echo was negative for myocardial ischemia or scar, normal left ventricular size, preserved systolic function.    Myocardial perfusion scan 2019: Normal    Echocardiogram 2019: Normal left ventricular size preserved function.  Mild mitral and tricuspid regurgitation.  Ascending aorta measured 4.2 cm.    Lipid panel:  Total  cholesterol 146 LDL 65 HDL 68 triglycerides 65.    IMPRESSIONS/RECOMMENDATIONS:  1. Coronary artery disease.  The patient's stress test was negative for ischemia.  Continue baby aspirin 81 mg daily.  2. Hypertension.  Continue Toprol-XL.  He is no longer on lisinopril.  3. Polygenic hypercholesterolemia.  The patient will continue his Crestor  10 mg daily.  Our goal is an apoB less than 80 and an LDL below 100.  4. MTHFR polymorphism.  Continue B-complex with L5 methyl folate.  5. Insulin resistance.  The patient needs to lose 20pounds.  He needs to cut back on his carbohydrates.  6. Obesity.  The patient needs to lose 20 pounds.  We discussed a low-carbohydrate, low-saturated fat Mediterranean Diet and an exercise walking program.  7.          Postoperative SVT -his recent EKG was normal sinus rhythm, is presently on flecainide 100 mg twice daily and Toprol- mg daily.  He is being followed by Dr. Carmona , and the plan is to have a Holter monitor in 4 weeks on the flecainide.    Summary:.  Tobias is doing well after his surgery.  We will see him back in the office in 3 months.  We will follow his treatment of paroxysmal SVT closely with Dr. Carmona    This was a 30 minute patient encounter with greater than 50% time spent in care coordination and counseling.    Sincerely,      Manuel Nichole MD   2/27/2019

## 2019-03-22 ENCOUNTER — OFFICE VISIT (OUTPATIENT)
Dept: FAMILY MEDICINE | Facility: CLINIC | Age: 74
End: 2019-03-22
Payer: MEDICARE

## 2019-03-22 ENCOUNTER — TELEPHONE (OUTPATIENT)
Dept: FAMILY MEDICINE | Facility: CLINIC | Age: 74
End: 2019-03-22

## 2019-03-22 ENCOUNTER — TELEPHONE (OUTPATIENT)
Dept: SCHEDULING | Facility: CLINIC | Age: 74
End: 2019-03-22

## 2019-03-22 VITALS
BODY MASS INDEX: 34.16 KG/M2 | DIASTOLIC BLOOD PRESSURE: 94 MMHG | TEMPERATURE: 98.4 F | WEIGHT: 244 LBS | RESPIRATION RATE: 16 BRPM | HEART RATE: 64 BPM | HEIGHT: 71 IN | SYSTOLIC BLOOD PRESSURE: 162 MMHG

## 2019-03-22 DIAGNOSIS — I47.10 SVT (SUPRAVENTRICULAR TACHYCARDIA) (CMS/HCC): ICD-10-CM

## 2019-03-22 DIAGNOSIS — Z96.651 S/P TKR (TOTAL KNEE REPLACEMENT), RIGHT: ICD-10-CM

## 2019-03-22 DIAGNOSIS — I10 ESSENTIAL HYPERTENSION: Primary | ICD-10-CM

## 2019-03-22 DIAGNOSIS — D59.12 COLD AGGLUTININ DISEASE (CMS/HCC): ICD-10-CM

## 2019-03-22 PROCEDURE — 99214 OFFICE O/P EST MOD 30 MIN: CPT | Performed by: FAMILY MEDICINE

## 2019-03-22 RX ORDER — LISINOPRIL AND HYDROCHLOROTHIAZIDE 12.5; 2 MG/1; MG/1
1 TABLET ORAL DAILY
COMMUNITY
End: 2022-01-04

## 2019-03-22 ASSESSMENT — ENCOUNTER SYMPTOMS: HYPERTENSION: 1

## 2019-03-22 NOTE — TELEPHONE ENCOUNTER
Wife is calling   just had some kind of heart surg and is  On a heart monitor and  They just got his bp taken at the [facility] and it is hgh.. I  Told them they have to call cardio and she asked me to check with the medical assistant and see what they have to say . I checked with [facility] and she stated they need to contact the cardiologist. I infod wife and Wife will call them now

## 2019-03-22 NOTE — TELEPHONE ENCOUNTER
S/p TKR 2/18. Per spouse before surgery pt was taking lisinopril and metoprolol. meds were changed to metoprolol and flecainide following SVT following surgery. Denies any missed doses. Bp now 188/106. Pt has f/u appt with pcp today at 3 pm.  Advised pt to be eval in ER. Spouse verbalized understanding. Pt spouse can be reached at  [phone]

## 2019-03-22 NOTE — PROGRESS NOTES
Patient ID: Tobias Little is a 73 y.o. male.        Subjective     Here for high BP readings. He had TKR and had ventricular arrhythmia during hospitalization. Stopped lisinopril at that time. Now having high BP readings. Denies palpitations, headache or neurologic symptoms. Bp 170/116. Review of meds lisinopril/hctz was held while in hospital was having some low BP. Has some leg edema but not worse. Ambulating without assistance now.      Hypertension   Pertinent negatives include no palpitations or shortness of breath.         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 Vitamin D Supreme, Disp: , Rfl:   •  coenzyme Q10 (CO Q-10) 10 mg capsule, Take 200 mg by mouth daily.  , Disp: , Rfl:   •  flecainide (TAMBOCOR) 100 mg tablet, Take 1 tablet (100 mg total) by mouth 2 (two) times a day., Disp: 180 tablet, Rfl: 3  •  latanoprost (XALATAN) 0.005 % ophthalmic solution, Administer 1 drop into affected eye(s) nightly. Into left eye, Disp: , Rfl: 6  •  lisinopril-hydrochlorothiazide (PRINZIDE,ZESTORETIC) 20-12.5 mg per tablet, Take 1 tablet by mouth daily., Disp: , Rfl:   •  metoprolol succinate XL (TOPROL-XL) 100 mg 24 hr tablet, Take 100 mg by mouth daily.  , Disp: , Rfl: 3  •  rosuvastatin (CRESTOR) 10 mg tablet, Take 1 tablet (10 mg total) by  "mouth daily., Disp: 90 tablet, Rfl: 1    Allergies   Allergen Reactions   • No Known Allergies      Review of Systems   Respiratory: Negative for chest tightness and shortness of breath.    Cardiovascular: Negative for palpitations.         Objective     Vitals:    03/22/19 1523   BP: (!) 162/94   BP Location: Left upper arm   Patient Position: Sitting   Pulse: 64   Resp: 16   Temp: 36.9 °C (98.4 °F)   TempSrc: Core   Weight: 111 kg (244 lb)   Height: 1.791 m (5' 10.5\")     Physical Exam   Constitutional: He is oriented to person, place, and time. He appears well-developed and well-nourished.   HENT:   Head: Normocephalic.   Mouth/Throat: Oropharynx is clear and moist.   Eyes: Pupils are equal, round, and reactive to light.   Cardiovascular: Normal rate, regular rhythm and normal heart sounds.    Pulmonary/Chest: Effort normal and breath sounds normal.   Musculoskeletal: He exhibits edema.   RLE. No calf tenderness   Lymphadenopathy:     He has no cervical adenopathy.   Neurological: He is alert and oriented to person, place, and time.   Skin: No rash noted.   Nursing note and vitals reviewed.      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 Will continue to monitor bp. He has no signs of dehydration or dry mouth with tambocor. Has upcoming f/u with cardiology.   "

## 2019-03-22 NOTE — TELEPHONE ENCOUNTER
Pt's wife called for a sooner appt pt just had knee replacement and was seen by Dr. Ryan (EP Cardiologist) at [facility].     Pt's BP has been running high and Dr. Ryan told pt to follow up with his cardiologist sooner than he next appt.     Please call pt's wife back at [phone]

## 2019-03-22 NOTE — TELEPHONE ENCOUNTER
I called patient wife and lvm but see in meantime triage nurse called and patient was advised to go to er

## 2019-03-22 NOTE — TELEPHONE ENCOUNTER
JU spoke with pt, he stated that Cardiologist is going to see him today, time is to be determined.

## 2019-03-26 ENCOUNTER — OFFICE VISIT (OUTPATIENT)
Dept: CARDIOLOGY | Facility: CLINIC | Age: 74
End: 2019-03-26
Payer: MEDICARE

## 2019-03-26 VITALS
SYSTOLIC BLOOD PRESSURE: 130 MMHG | DIASTOLIC BLOOD PRESSURE: 80 MMHG | HEIGHT: 71 IN | BODY MASS INDEX: 33.53 KG/M2 | WEIGHT: 239.5 LBS

## 2019-03-26 DIAGNOSIS — I25.10 CORONARY ARTERY DISEASE INVOLVING NATIVE CORONARY ARTERY OF NATIVE HEART WITHOUT ANGINA PECTORIS: Primary | ICD-10-CM

## 2019-03-26 PROCEDURE — 99214 OFFICE O/P EST MOD 30 MIN: CPT | Performed by: INTERNAL MEDICINE

## 2019-03-26 NOTE — LETTER
2019     Sisi Britt MD  [address] [address]    Patient: Tobias Valverde   YOB: 1945   Date of Visit: 3/26/2019       Dear Dr. Britt:    Thank you for referring Tobias Valverde to me for evaluation. Below are my notes for this consultation.    If you have questions, please do not hesitate to call me. I look forward to following your patient along with you.         Sincerely,        Manuel Nichole MD        CC: No Recipients  Manuel Nichole MD  3/26/2019 11:28 AM  Sign at close encounter  Patient MRN: [id number]  Date: 2017  Description: Prog Notes ([facility]) Cardiology  Category: Progress Notes ([facility])   Provider ID: [id number]  Practice ID: [id number]  [place] ID: [id number]    3/26/2019        Rai Lopez M.D.  [address] [place] [address]  [place]IR[place] [address]    Re:  TOBIAS VALVERDE  :  1945    Dear Rai:    It was my pleasure to see Tobias Valverde in the cardiovascular office today.  As you know, he self-referred for cardiovascular risk assessment.  The patient carries a history of subclinical coronary artery disease with an Agatston score of 648 with two vessel involvement, hypertension and polygenic hypercholesterolemia.    He recently underwent right total knee replacement at the [facility].  This was complicated by postoperative paroxysmal SVT.  He was discharged on flecainide 100 mg twice daily and Toprol- mg daily.  He has had no recurrence.  He is wearing a Holter monitor.  He will follow up with Dr. Obed Carmona.     Clinically, he is doing well.  He denies any chest pain, shortness of breath or palpitations.  His blood pressure adry back up and he is now back on his lisinopril.    We reviewed his prevention program in detail.  His last advanced lipid panel revealed a total cholesterol of 146 LDL 65 HDL 68 triglycerides 65.  Presently, he is on Crestor 10 mg  daily.    There is evidence of insulin resistance.  We recommended a weight reduction diet and his weight is 235 pounds.  His goal weight is 220 pounds.  We discussed a low-carbohydrate, low-saturated fat Mediterranean Diet and to cut back on his carbohydrates, both sugar as well as saturated fats.  We asked him to check out the [employer] website.    PAST MEDICAL HISTORY:   1.  Subclinical coronary artery disease, coronary calcium score 648 with two vessel involvement including the LAD and RCA, March 2016  2.  dyslipidemia consistent with mild polygenic hypercholesterolemia, total cholesterol 198, , HDL 65, triglycerides 52, apoB 92, LDL-P 1496, normal LP(a)  3.  MTHFR polymorphism with hyperhomocystinemia  4.  endothelial dysfunction  5.  insulin resistance  6.  Hypertension  7.  abnormal EKG with incomplete right bundle branch block, frequent PVC's  8.  subdural hematoma in 2008 complicated by paroxysmal atrial fibrillation.    9. Stress echo was performed in April 2016 which was negative for myocardial ischemia or scar, normal left ventricular size, preserved systolic function.  He has a slightly dilated aortic root at 4.4 cm.  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  Postoperative PSVT.    PAST SURGICAL HISTORY:    Neurosurgery for subdural hematoma  appendectomy.  Right total knee replacement 2/18/2019    CURRENT MEDICATIONS:       Current Outpatient Prescriptions:   •  cholecalciferol, vitamin D3, (VITAMIN D3 ORAL), Take 1 tablet by mouth daily. Vitamin D Supreme, Disp: , Rfl:   •  coenzyme Q10 (CO Q-10) 10 mg capsule, Take 200 mg by mouth daily.  , Disp: , Rfl:   •  flecainide (TAMBOCOR) 100 mg tablet, Take 1 tablet (100 mg total) by mouth 2 (two) times a day., Disp: 180 tablet, Rfl: 3  •  latanoprost (XALATAN) 0.005 % ophthalmic solution, Administer 1 drop into affected eye(s) nightly. Into left eye, Disp: , Rfl: 6  •  lisinopril-hydrochlorothiazide (PRINZIDE,ZESTORETIC) 20-12.5 mg per tablet, Take 1 tablet by  mouth daily., Disp: , Rfl:   •  metoprolol succinate XL (TOPROL-XL) 100 mg 24 hr tablet, Take 100 mg by mouth daily.  , Disp: , Rfl: 3  •  rosuvastatin (CRESTOR) 10 mg tablet, Take 1 tablet (10 mg total) by mouth daily., Disp: 90 tablet, Rfl: 1      SUPPLEMENTS:  Coenzyme-Q10 100 mg daily, vitamin-D 7330-5912 international units daily, omega-3 fish oil 1000 mg daily, multiple vitamin.    ALLERGIES:  No known drug allergies.    FAMILY HISTORY:  Father  young at age 55 of lung cancer.  Mother has a pacemaker.    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  His wife's name is Jaye.  They have three children.  He has seven grandchildren.  He smoked 38 years ago for five years.  He rarely drinks alcohol.  He has an active lifestyle.  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    REVIEW OF SYSTEMS: Patient developed PSVT while he was at the [facility] postoperatively after knee replacement surgery.  He was evaluated by electrophysiology and recommended to begin flecainide and Toprol-XL.  He had no recurrence will [facility] up [facility].    PHYSICAL EXAMINATION:    Weight:  239.  Blood pressure: 130/80  Heart rate:  67.  Respiratory rate:  12.  Temperature: Afebrile.  HEENT:  Unremarkable.  Neck:  Supple, no JVD.  Lungs:  Clear to auscultation and percussion.  Cardiac:  Regular rate and rhythm.  Abdomen:  Soft, bowel sounds present, no organomegaly.  Extremities:  No edema, pulses intact.  Skin:  Warm and dry.  Neuro:  Alert and oriented X 3.    LABORATORY DATA:      EKG:  Normal sinus rhythm, incomplete right bundle branch block pattern.    Coronary calcium score:  648 with two vessel involvement.      Stress echo was negative for myocardial ischemia or scar, normal left ventricular size, preserved systolic function.    Myocardial perfusion scan 2019: Normal    Echocardiogram 2019: Normal left  ventricular size preserved function.  Mild mitral and tricuspid regurgitation.  Ascending aorta measured 4.2 cm.    Lipid panel:  Total cholesterol 146 LDL 65 HDL 68 triglycerides 65.    IMPRESSIONS/RECOMMENDATIONS:  1. Coronary artery disease.  The patient's stress test was negative for ischemia.  Continue baby aspirin 81 mg daily.  2. Hypertension.  Continue Toprol-XL and lisinopril HCT.  3. Polygenic hypercholesterolemia.  The patient will continue his Crestor  10 mg daily.  Our goal is an apoB less than 80 and an LDL below 100.  4. MTHFR polymorphism.  Continue B-complex with L5 methyl folate.  5. Insulin resistance.  The patient needs to lose 20pounds.  He needs to cut back on his carbohydrates.  6. Obesity.  The patient needs to lose 20 pounds.  We discussed a low-carbohydrate, low-saturated fat Mediterranean Diet and an exercise walking program.  7.          Postoperative SVT -his recent EKG was normal sinus rhythm, is presently on flecainide 100 mg twice daily and Toprol- mg daily.  He is being followed by Dr. Carmona.    Summary:.  Tobias is doing well after his surgery.  We will see him back in the office in 3 months.  We will follow his treatment of paroxysmal SVT closely with Dr. Carmona.  Blood pressure is stabilized on lisinopril HCT.    This was a 30 minute patient encounter with greater than 50% time spent in care coordination and counseling.    Sincerely,      Manuel Nichole MD   3/26/2019

## 2019-03-26 NOTE — PROGRESS NOTES
Patient MRN: [id number]  Date: 2017  Description: Prog Notes ([facility]) Cardiology  Category: Progress Notes ([facility])   Provider ID: [id number]  Practice ID: [id number]  Sun'aq ID: [id number]    3/26/2019        Rai Lopez M.D.  [address] [facility]  [place] [address]    Re:  TOBIAS VALVERDE  :  1945    Dear Rai:    It was my pleasure to see Tobias Valverde in the cardiovascular office today.  As you know, he self-referred for cardiovascular risk assessment.  The patient carries a history of subclinical coronary artery disease with an Agatston score of 648 with two vessel involvement, hypertension and polygenic hypercholesterolemia.    He recently underwent right total knee replacement at the [facility].  This was complicated by postoperative paroxysmal SVT.  He was discharged on flecainide 100 mg twice daily and Toprol- mg daily.  He has had no recurrence.  He is wearing a Holter monitor.  He will follow up with Dr. Obed Carmona.     Clinically, he is doing well.  He denies any chest pain, shortness of breath or palpitations.  His blood pressure adry back up and he is now back on his lisinopril.    We reviewed his prevention program in detail.  His last advanced lipid panel revealed a total cholesterol of 146 LDL 65 HDL 68 triglycerides 65.  Presently, he is on Crestor 10 mg daily.    There is evidence of insulin resistance.  We recommended a weight reduction diet and his weight is 235 pounds.  His goal weight is 220 pounds.  We discussed a low-carbohydrate, low-saturated fat Mediterranean Diet and to cut back on his carbohydrates, both sugar as well as saturated fats.  We asked him to check out the [employer] website.    PAST MEDICAL HISTORY:   1.  Subclinical coronary artery disease, coronary calcium score 648 with two vessel involvement including the LAD and RCA, 2016  2.  dyslipidemia consistent with mild polygenic hypercholesterolemia,  total cholesterol 198, , HDL 65, triglycerides 52, apoB 92, LDL-P 1496, normal LP(a)  3.  MTHFR polymorphism with hyperhomocystinemia  4.  endothelial dysfunction  5.  insulin resistance  6.  Hypertension  7.  abnormal EKG with incomplete right bundle branch block, frequent PVC's  8.  subdural hematoma in  complicated by paroxysmal atrial fibrillation.    9. Stress echo was performed in 2016 which was negative for myocardial ischemia or scar, normal left ventricular size, preserved systolic function.  He has a slightly dilated aortic root at 4.4 cm.  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  Postoperative PSVT.    PAST SURGICAL HISTORY:    Neurosurgery for subdural hematoma  appendectomy.  Right total knee replacement 2019    CURRENT MEDICATIONS:       Current Outpatient Prescriptions:   •  cholecalciferol, vitamin D3, (VITAMIN D3 ORAL), Take 1 tablet by mouth daily. Vitamin D Supreme, Disp: , Rfl:   •  coenzyme Q10 (CO Q-10) 10 mg capsule, Take 200 mg by mouth daily.  , Disp: , Rfl:   •  flecainide (TAMBOCOR) 100 mg tablet, Take 1 tablet (100 mg total) by mouth 2 (two) times a day., Disp: 180 tablet, Rfl: 3  •  latanoprost (XALATAN) 0.005 % ophthalmic solution, Administer 1 drop into affected eye(s) nightly. Into left eye, Disp: , Rfl: 6  •  lisinopril-hydrochlorothiazide (PRINZIDE,ZESTORETIC) 20-12.5 mg per tablet, Take 1 tablet by mouth daily., Disp: , Rfl:   •  metoprolol succinate XL (TOPROL-XL) 100 mg 24 hr tablet, Take 100 mg by mouth daily.  , Disp: , Rfl: 3  •  rosuvastatin (CRESTOR) 10 mg tablet, Take 1 tablet (10 mg total) by mouth daily., Disp: 90 tablet, Rfl: 1      SUPPLEMENTS:  Coenzyme-Q10 100 mg daily, vitamin-D 2401-1398 international units daily, omega-3 fish oil 1000 mg daily, multiple vitamin.    ALLERGIES:  No known drug allergies.    FAMILY HISTORY:  Father  young at age 55 of lung cancer.  Mother has a pacemaker.    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  His wife's name is Jaye.  They have three children.  He has seven grandchildren.  He smoked 38 years ago for five years.  He rarely drinks alcohol.  He has an active lifestyle.  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    REVIEW OF SYSTEMS: Patient developed PSVT while he was at the [facility] postoperatively after knee replacement surgery.  He was evaluated by electrophysiology and recommended to begin flecainide and Toprol-XL.  He had no recurrence will follow up at [facility].    PHYSICAL EXAMINATION:    Weight:  239.  Blood pressure: 130/80  Heart rate:  67.  Respiratory rate:  12.  Temperature: Afebrile.  HEENT:  Unremarkable.  Neck:  Supple, no JVD.  Lungs:  Clear to auscultation and percussion.  Cardiac:  Regular rate and rhythm.  Abdomen:  Soft, bowel sounds present, no organomegaly.  Extremities:  No edema, pulses intact.  Skin:  Warm and dry.  Neuro:  Alert and oriented X 3.    LABORATORY DATA:      EKG:  Normal sinus rhythm, incomplete right bundle branch block pattern.    Coronary calcium score:  648 with two vessel involvement.      Stress echo was negative for myocardial ischemia or scar, normal left ventricular size, preserved systolic function.    Myocardial perfusion scan February 2019: Normal    Echocardiogram February 2019: Normal left ventricular size preserved function.  Mild mitral and tricuspid regurgitation.  Ascending aorta measured 4.2 cm.    Lipid panel:  Total cholesterol 146 LDL 65 HDL 68 triglycerides 65.    IMPRESSIONS/RECOMMENDATIONS:  1. Coronary artery disease.  The patient's stress test was negative for ischemia.  Continue baby aspirin 81 mg daily.  2. Hypertension.  Continue Toprol-XL and lisinopril HCT.  3. Polygenic hypercholesterolemia.  The patient will continue his Crestor  10 mg daily.  Our goal is an apoB less than 80 and an LDL below 100.  4. MTHFR polymorphism.  Continue B-complex with  L5 methyl folate.  5. Insulin resistance.  The patient needs to lose 20pounds.  He needs to cut back on his carbohydrates.  6. Obesity.  The patient needs to lose 20 pounds.  We discussed a low-carbohydrate, low-saturated fat Mediterranean Diet and an exercise walking program.  7.          Postoperative SVT -his recent EKG was normal sinus rhythm, is presently on flecainide 100 mg twice daily and Toprol- mg daily.  He is being followed by Dr. Carmona.    Summary:.  Tobias is doing well after his surgery.  We will see him back in the office in 3 months.  We will follow his treatment of paroxysmal SVT closely with Dr. Carmona.  Blood pressure is stabilized on lisinopril HCT.    This was a 30 minute patient encounter with greater than 50% time spent in care coordination and counseling.    Sincerely,      Manuel Nichole MD   3/26/2019

## 2019-03-28 PROBLEM — Z96.651 S/P TKR (TOTAL KNEE REPLACEMENT), RIGHT: Status: ACTIVE | Noted: 2019-02-18

## 2019-03-28 ASSESSMENT — ENCOUNTER SYMPTOMS
PALPITATIONS: 0
SHORTNESS OF BREATH: 0
CHEST TIGHTNESS: 0

## 2019-04-03 ENCOUNTER — OFFICE VISIT (OUTPATIENT)
Dept: FAMILY MEDICINE | Facility: CLINIC | Age: 74
End: 2019-04-03
Payer: MEDICARE

## 2019-04-03 VITALS
DIASTOLIC BLOOD PRESSURE: 82 MMHG | BODY MASS INDEX: 33.29 KG/M2 | RESPIRATION RATE: 16 BRPM | SYSTOLIC BLOOD PRESSURE: 132 MMHG | WEIGHT: 237.8 LBS | TEMPERATURE: 98.2 F | HEART RATE: 62 BPM | HEIGHT: 71 IN

## 2019-04-03 DIAGNOSIS — R35.1 NOCTURIA: ICD-10-CM

## 2019-04-03 DIAGNOSIS — I47.10 SVT (SUPRAVENTRICULAR TACHYCARDIA) (CMS/HCC): ICD-10-CM

## 2019-04-03 DIAGNOSIS — I10 ESSENTIAL HYPERTENSION: ICD-10-CM

## 2019-04-03 DIAGNOSIS — D59.12 COLD AGGLUTININ DISEASE (CMS/HCC): ICD-10-CM

## 2019-04-03 DIAGNOSIS — R73.09 ELEVATED GLUCOSE: Primary | ICD-10-CM

## 2019-04-03 DIAGNOSIS — Z96.651 S/P TKR (TOTAL KNEE REPLACEMENT), RIGHT: ICD-10-CM

## 2019-04-03 DIAGNOSIS — E72.12 HOMOZYGOUS FOR C677T POLYMORPHISM OF MTHFR (CMS/HCC): ICD-10-CM

## 2019-04-03 PROBLEM — Z86.79 HISTORY OF ATRIAL FIBRILLATION: Status: ACTIVE | Noted: 2019-02-21

## 2019-04-03 PROBLEM — M17.0 PRIMARY OSTEOARTHRITIS OF BOTH KNEES: Status: ACTIVE | Noted: 2017-10-17

## 2019-04-03 PROBLEM — I47.19 ATRIAL TACHYCARDIA (CMS/HCC): Status: ACTIVE | Noted: 2019-02-23

## 2019-04-03 PROCEDURE — 99214 OFFICE O/P EST MOD 30 MIN: CPT | Performed by: FAMILY MEDICINE

## 2019-04-03 ASSESSMENT — MINI COG
COMPLETED: YES
TOTAL SCORE: 5

## 2019-04-03 ASSESSMENT — VISUAL ACUITY
OD_CC: 20/25
OS_CC: 20/20

## 2019-04-03 NOTE — PROGRESS NOTES
Subjective     Tobias Little is a 73 y.o. male who presents for a f/u. Initially schedules as wellness but has not been one year.     Has holter still will get off on Friday. No further awareness of tachycardia, palpitations, dyspnea or dizziness.   BP much improved. Tolerating her BP med well.  Knee doing nicely. Ambulating much better. Swelling is better.      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[facility])   • Bruises easily   • SVT (supraventricular tachycardia) (CMS/[facility]) ([facility])   • S/P TKR (total knee replacement), right   • Nocturia   • Atrial tachycardia (CMS/[facility]) ([facility])   • History of atrial fibrillation   • Primary osteoarthritis of both knees   • Hematuria, unspecified     Past Medical History:   Diagnosis Date   • Coronary artery disease    • 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 Vitamin D Supreme     • coenzyme Q10 (CO Q-10) 10 mg capsule Take 200 mg by mouth daily.       • flecainide (TAMBOCOR) 100 mg tablet Take 1 tablet (100 mg total) by mouth 2 (two) times a day. 180 tablet 3   • latanoprost (XALATAN) 0.005 % ophthalmic solution Administer 1 drop into affected  "eye(s) nightly. Into left eye  6   • lisinopril-hydrochlorothiazide (PRINZIDE,ZESTORETIC) 20-12.5 mg per tablet Take 1 tablet by mouth daily.     • metoprolol succinate XL (TOPROL-XL) 100 mg 24 hr tablet Take 100 mg by mouth daily.    3   • rosuvastatin (CRESTOR) 10 mg tablet Take 1 tablet (10 mg total) by mouth daily. 90 tablet 1     No current facility-administered medications for this visit.      Social History     Social History   • Marital status:      Spouse name: N/A   • Number of children: N/A   • Years of education: N/A     Social History Main Topics   • Smoking status: Former Smoker   • Smokeless tobacco: Former User      Comment: Pt Quit Smoking over 40 years ago    • Alcohol use Yes      Comment: Occassionally    • Drug use: No   • Sexual activity: Defer     Other Topics Concern   • None     Social History Narrative   • None       Objective   Vitals  Vitals:    04/03/19 0829   BP: 132/82   BP Location: Left upper arm   Patient Position: Sitting   Pulse: 62   Resp: 16   Temp: 36.8 °C (98.2 °F)   TempSrc: Oral   Weight: 108 kg (237 lb 12.8 oz)   Height: 1.803 m (5' 11\")     Body mass index is 33.17 kg/m².    Physical Exam   Constitutional: He appears well-developed and well-nourished.   HENT:   Head: Normocephalic.   Right Ear: Tympanic membrane and external ear normal.   Left Ear: Tympanic membrane and external ear normal.   Mouth/Throat: Oropharynx is clear and moist.   Eyes: Conjunctivae are normal. Pupils are equal, round, and reactive to light.   Neck: Normal range of motion. No thyromegaly present.   Cardiovascular: Normal rate, regular rhythm, normal heart sounds and intact distal pulses.    No murmur heard.  Pulmonary/Chest: Effort normal and breath sounds normal. He has no wheezes.   Abdominal: Soft. Bowel sounds are normal. He exhibits no mass. There is no tenderness.   Musculoskeletal: He exhibits no edema or deformity.   Lymphadenopathy:     He has no cervical adenopathy.   Neurological: " No sensory deficit. Coordination normal.   Skin: Skin is warm. Capillary refill takes less than 2 seconds. No rash noted. No erythema.   Psychiatric: He has a normal mood and affect.   Nursing note and vitals reviewed.        Advanced Care Plan                                        PHQ                                             Mini Cog  Completed: Yes  Score: 5  Result: Negative    Get Up and Go               STEADI Falls Risk                                                                Hearing and Vision Screening   Visual Acuity Screening    Right eye Left eye Both eyes   Without correction:      With correction: 20/25 20/20 20/20         Assessment/Plan   Diagnoses and all orders for this visit:    Elevated glucose (Primary)    Nocturia  -     PSA; Future    Essential hypertension  Assessment & Plan:  controlled    Orders:  -     Comprehensive metabolic panel; Future    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      S/P TKR (total knee replacement), right  Assessment & Plan:  Doing well            See Patient Instructions (the written plan) which was given to the patient for PPPS and health risk factors with interventions.

## 2019-04-03 NOTE — PATIENT INSTRUCTIONS
Men's Personalized Prevention Plan Services (PPPS)        Preventive Services Checklist (Assumes Average Risk Unless Otherwise Noted)  Preventive Service Target Population and Frequency Last Done Date Due   Abdominal Aortic Aneurysm Screening Any 1 risk factor: 1) family history of AAA or 2) 65-76yo and smoked 100+ cigarettes in a lifetime (covered once)     Alcohol Misuse Screening As necessary for those at risk (covered annually)     Cholesterol Screening As necessary >=36yo; 20-36yo if increased risk coronary artery disease (covered every 5 years)      Colorectal Cancer Screening >=51yo: Colonoscopy every 10 years, FIT annually or Cologuard every 3 years (up to 84yo for Cologuard)     Diabetes Screening Any 1 risk factor: hypertension, dyslipidemia, obesity, high glucose; or Any 2 risk factors: >=66 yo, overweight, family history diabetes (covered every 6 months)     Glaucoma Screening Any 1 risk factor: 1) diabetes, 2) family history glaucoma, 3)  >=51yo, 4)  American >=66yo (covered annually)     Hepatitis C Screening Any 1 risk factor: 1) born between 6042-8047, 2) blood transfusion before 1992, 3) current or past injection drug use (covered once for average risk, annually for high risk)     Lung Cancer Screening Low-dose chest CT if all 3 risk factors: 1) 55-76yo, 2) smoker or quit within last 15y, 3) >=30 pack years (covered annually)     Prostate Cancer Screening 55-69 years old if patient desires test after weighing potential harms and benefits (covered annually)     Sexually Transmitted Diseases (STDs) As necessary chlamydia, gonorrhea, syphilis, hepatitis B (covered annually)  HIV if any 1 risk factor present: 1) <16yo or >66yo and at increased risk or 2) 15-66yo and ask for it (covered annually)     Vaccine: Hepatitis B As necessary if medium or high risk (series covered once)     Vaccine: Influenza All without contraindications (covered annually.)   "   Vaccine: Pneumococcal Pneumovax + Prevnar (both covered, >=11 months apart)     Vaccine: Shingrix (Shingles) >= 51yo without contraindications   (2 doses, 2 months apart)     Other Services:                     Men's Personalized Prevention Plan Services (PPPS)                                          Health Risk Factors and Interventions  \"x\" Indicates risk is associated with this patient  Risk Factor Recommended Interventions     Family history of      Obesity     Hypertension     Diabetes     Fall Risk     Tobacco Use     Other:      "

## 2019-05-01 DIAGNOSIS — E78.00 HYPERCHOLESTEROLEMIA: ICD-10-CM

## 2019-05-01 RX ORDER — ROSUVASTATIN CALCIUM 10 MG/1
10 TABLET, COATED ORAL DAILY
Qty: 90 TABLET | Refills: 1 | Status: SHIPPED | OUTPATIENT
Start: 2019-05-01 | End: 2020-04-17 | Stop reason: SDUPTHER

## 2019-06-10 ENCOUNTER — OFFICE VISIT (OUTPATIENT)
Dept: FAMILY MEDICINE | Facility: CLINIC | Age: 74
End: 2019-06-10
Payer: MEDICARE

## 2019-06-10 VITALS
BODY MASS INDEX: 31.22 KG/M2 | HEIGHT: 71 IN | SYSTOLIC BLOOD PRESSURE: 130 MMHG | WEIGHT: 223 LBS | DIASTOLIC BLOOD PRESSURE: 82 MMHG | RESPIRATION RATE: 16 BRPM | TEMPERATURE: 98.2 F | HEART RATE: 67 BPM

## 2019-06-10 DIAGNOSIS — J02.9 ACUTE PHARYNGITIS, UNSPECIFIED ETIOLOGY: ICD-10-CM

## 2019-06-10 DIAGNOSIS — R09.89 ABNORMAL LUNG SOUNDS: ICD-10-CM

## 2019-06-10 DIAGNOSIS — J02.9 PHARYNGITIS, UNSPECIFIED ETIOLOGY: Primary | ICD-10-CM

## 2019-06-10 LAB
EXPIRATION DATE: NORMAL
Lab: NORMAL
POCT MANUFACTURER: NORMAL
S PYO AG THROAT QL: NEGATIVE

## 2019-06-10 PROCEDURE — 87880 STREP A ASSAY W/OPTIC: CPT | Mod: QW | Performed by: FAMILY MEDICINE

## 2019-06-10 PROCEDURE — 99214 OFFICE O/P EST MOD 30 MIN: CPT | Performed by: FAMILY MEDICINE

## 2019-06-10 ASSESSMENT — ENCOUNTER SYMPTOMS
SWOLLEN GLANDS: 0
COUGH: 1
HOARSE VOICE: 1
SORE THROAT: 1

## 2019-06-10 NOTE — PROGRESS NOTES
Patient ID: Tobias Little is a 73 y.o. male.        Subjective     Sore Throat    This is a new problem. Episode onset: 2 days. There has been no fever. Associated symptoms include coughing (clearing throat) and a hoarse voice (minimal). Pertinent negatives include no ear pain or swollen glands.         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 Vitamin D Supreme  •  coenzyme Q10 (CO Q-10) 10 mg capsule, Take 200 mg by mouth daily.    •  flecainide (TAMBOCOR) 100 mg tablet, Take 1 tablet (100 mg total) by mouth 2 (two) times a day.  •  latanoprost (XALATAN) 0.005 % ophthalmic solution, Administer 1 drop into affected eye(s) nightly. Into left eye  •  lisinopril-hydrochlorothiazide (PRINZIDE,ZESTORETIC) 20-12.5 mg per tablet, Take 1 tablet by mouth daily.  •  metoprolol succinate XL (TOPROL-XL) 100 mg 24 hr tablet, Take 100 mg by mouth daily.    •  rosuvastatin (CRESTOR) 10 mg tablet, Take 1 tablet (10 mg total) by mouth daily.    Allergies   Allergen Reactions   • No Known Allergies      Review of Systems   HENT: Positive for hoarse voice (minimal) and sore throat. Negative for ear pain.    Respiratory:  "Positive for cough (clearing throat).          Objective     Vitals:    06/10/19 1458   BP: 130/82   BP Location: Left upper arm   Patient Position: Sitting   Pulse: 67   Resp: 16   Temp: 36.8 °C (98.2 °F)   TempSrc: Oral   Weight: 101 kg (223 lb)   Height: 1.803 m (5' 11\")     Physical Exam   Constitutional: He appears well-developed and well-nourished.   HENT:   Head: Normocephalic.   Right Ear: Tympanic membrane normal.   Left Ear: Tympanic membrane normal.   Mouth/Throat: Posterior oropharyngeal erythema present. No posterior oropharyngeal edema.   Eyes: Pupils are equal, round, and reactive to light.   Cardiovascular: Normal rate, regular rhythm and normal heart sounds.    Pulmonary/Chest: Effort normal. He has decreased breath sounds in the right lower field. He has no wheezes. He has no rales.   Lymphadenopathy:     He has no cervical adenopathy.   Nursing note and vitals reviewed.      Assessment/Plan       Problem List Items Addressed This Visit     None      Visit Diagnoses     Pharyngitis, unspecified etiology    -  Primary    Relevant Orders    POCT rapid strep A (Completed)    Abnormal lung sounds        Relevant Orders    X-RAY CHEST 2 VIEWS    Acute pharyngitis, unspecified etiology        viral. supportive care        "

## 2019-06-15 ENCOUNTER — HOSPITAL ENCOUNTER (OUTPATIENT)
Dept: RADIOLOGY | Age: 74
Discharge: HOME | End: 2019-06-15
Attending: FAMILY MEDICINE
Payer: MEDICARE

## 2019-06-15 DIAGNOSIS — R09.89 ABNORMAL LUNG SOUNDS: ICD-10-CM

## 2019-06-15 PROCEDURE — 71046 X-RAY EXAM CHEST 2 VIEWS: CPT

## 2019-06-17 ENCOUNTER — TELEPHONE (OUTPATIENT)
Dept: FAMILY MEDICINE | Facility: CLINIC | Age: 74
End: 2019-06-17

## 2019-06-17 NOTE — TELEPHONE ENCOUNTER
Pt had CXR which was read today.  It is showing new elevation at the right hemidiaphram.  CT with contrast is recommended.  They wanted to make sure Dr Reese was aware.  JU

## 2019-06-18 ENCOUNTER — TELEPHONE (OUTPATIENT)
Dept: FAMILY MEDICINE | Facility: CLINIC | Age: 74
End: 2019-06-18

## 2019-06-18 DIAGNOSIS — J98.6 ACQUIRED ELEVATED DIAPHRAGM: Primary | ICD-10-CM

## 2019-06-18 NOTE — TELEPHONE ENCOUNTER
Pls let him know I sent portal message. Radiologist requesting a CAT scan to further evaluate the elevation of the right hemidiaphragm.  I have entered the order.  Please process.  He had lab work done at [facility] for his kidney function on May 21, 2019.

## 2019-06-19 ENCOUNTER — TELEPHONE (OUTPATIENT)
Dept: FAMILY MEDICINE | Facility: CLINIC | Age: 74
End: 2019-06-19

## 2019-06-19 NOTE — TELEPHONE ENCOUNTER
Spoke with patient in regards to his labwork done on may 21 st,  would like the Cat scan tech to have it also  explained that we are unable to print out the results to send them for his CAT scan. He will call  and get labs for us. If he has any problems he will call us back.

## 2019-06-24 ENCOUNTER — TELEPHONE (OUTPATIENT)
Dept: FAMILY MEDICINE | Facility: CLINIC | Age: 74
End: 2019-06-24

## 2019-06-24 ENCOUNTER — HOSPITAL ENCOUNTER (OUTPATIENT)
Dept: RADIOLOGY | Age: 74
Discharge: HOME | End: 2019-06-24
Attending: FAMILY MEDICINE
Payer: MEDICARE

## 2019-06-24 DIAGNOSIS — J98.6 ACQUIRED ELEVATED DIAPHRAGM: ICD-10-CM

## 2019-06-24 PROCEDURE — 63600105 HC IODINE BASED CONTRAST

## 2019-06-24 PROCEDURE — 71260 CT THORAX DX C+: CPT

## 2019-06-24 RX ADMIN — IOHEXOL 75 ML: 350 INJECTION, SOLUTION INTRAVENOUS at 08:14

## 2019-06-24 NOTE — TELEPHONE ENCOUNTER
Spoke with patient, he is aware of results.  I will forward CT to Dr Estrada Kelsey, his pulmonologist. He will follow up.

## 2019-06-24 NOTE — TELEPHONE ENCOUNTER
CT chest shows no worrisome finding to explain the elevated diaphragm. This is most consistent with the nerve to the diaphragm not controlling that side of diaphragm properly. No pneumonia or tumors. Other chronic changes are stable. I am not sure if this will correct overtime but find out who his pulmonologist is and we can have him follow up.

## 2019-07-01 ENCOUNTER — TELEPHONE (OUTPATIENT)
Dept: FAMILY MEDICINE | Facility: CLINIC | Age: 74
End: 2019-07-01

## 2019-07-01 NOTE — TELEPHONE ENCOUNTER
Ranijt has scheduled patient to be seen on Friday July 5th for a 30 minute appointment. I was asked to pass on to you how very thankful the patient is to be able to come that day and see you.  You have been so helpful.

## 2019-07-01 NOTE — TELEPHONE ENCOUNTER
Please call PT [phone]  He had some tests down at [place] and he wamted to discuss with you. The first appt I could give them was 7/16/2019, and he did not want to wait that long, please call

## 2019-07-01 NOTE — TELEPHONE ENCOUNTER
Ranjit, can you assist me to coordinate a sooner appointment for this patient? Thanks, I will call patient with appointment time

## 2019-07-05 ENCOUNTER — TELEPHONE (OUTPATIENT)
Dept: FAMILY MEDICINE | Facility: CLINIC | Age: 74
End: 2019-07-05

## 2019-07-05 ENCOUNTER — OFFICE VISIT (OUTPATIENT)
Dept: FAMILY MEDICINE | Facility: CLINIC | Age: 74
End: 2019-07-05
Payer: MEDICARE

## 2019-07-05 VITALS
TEMPERATURE: 98.3 F | HEART RATE: 75 BPM | DIASTOLIC BLOOD PRESSURE: 76 MMHG | HEIGHT: 71 IN | WEIGHT: 224 LBS | SYSTOLIC BLOOD PRESSURE: 132 MMHG | BODY MASS INDEX: 31.36 KG/M2

## 2019-07-05 DIAGNOSIS — J98.6 ACQUIRED ELEVATED DIAPHRAGM: Primary | ICD-10-CM

## 2019-07-05 DIAGNOSIS — I10 ESSENTIAL HYPERTENSION: ICD-10-CM

## 2019-07-05 DIAGNOSIS — D59.12 COLD AGGLUTININ DISEASE (CMS/HCC): ICD-10-CM

## 2019-07-05 PROCEDURE — 99214 OFFICE O/P EST MOD 30 MIN: CPT | Performed by: FAMILY MEDICINE

## 2019-07-05 NOTE — TELEPHONE ENCOUNTER
Spoke with reading room at [facility]-they will pass message along to reading radiologist and an addendum report will be issued.

## 2019-07-05 NOTE — PROGRESS NOTES
Patient ID: Tobias Little is a 73 y.o. male.        Subjective     Here for f/u since having his CAT scan for the elevated hemidiaphragm.  He has seen pulmonology and suspects it is nerve related.  Advised this may take months to year to heal.  If lying down and arms stretched over his head he feels he has to force himself to breathe  His labs have also been repeated. Questions about labs.  We had a long discussion about his varying hemoglobin and platelets.  He has recently seen hematology and has recommended repeating the blood work in November when the weather is cooler.  His digits get cold easily.  He also gets some mottling of his trunk in cooler weather.    Has loop recorder. Asking if I have access to the data.          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 (CMS/HCC) (Formerly [facility])   • History of traumatic subdural hematoma   • Other specified glaucoma   • Cold agglutinin disease (CMS/HCC) (Formerly [facility])   • Bruises easily   • SVT (supraventricular tachycardia) (CMS/HCC) (HCC)   • S/P TKR (total knee replacement), right   • Nocturia   • Atrial tachycardia (CMS/HCC) (HCC)   • History of atrial fibrillation   • Primary osteoarthritis of both knees   • Hematuria, unspecified   • Acquired elevated diaphragm       Outpatient Encounter Prescriptions as of 7/5/2019:   •  apixaban (ELIQUIS) 5 mg tablet, Take 5 mg by mouth 2 (two) times a day.  •  cholecalciferol, vitamin D3, (VITAMIN D3 ORAL), Take 5,000 IU by mouth daily. Vitamin D Supreme    •  coenzyme Q10 (CO Q-10) 10 mg capsule, Take 200 mg by mouth daily.    •  latanoprost (XALATAN) 0.005 % ophthalmic solution, Administer 1 drop into affected eye(s) nightly. Into  "left eye  •  lisinopril-hydrochlorothiazide (PRINZIDE,ZESTORETIC) 20-12.5 mg per tablet, Take 1 tablet by mouth daily.  •  metoprolol succinate XL (TOPROL-XL) 100 mg 24 hr tablet, Take 100 mg by mouth daily.    •  rosuvastatin (CRESTOR) 10 mg tablet, Take 1 tablet (10 mg total) by mouth daily.  •  flecainide (TAMBOCOR) 100 mg tablet, Take 1 tablet (100 mg total) by mouth 2 (two) times a day.    Allergies   Allergen Reactions   • No Known Allergies      Review of Systems      Objective     Vitals:    07/05/19 1359   BP: 132/76   BP Location: Left upper arm   Patient Position: Sitting   Pulse: 75   Temp: 36.8 °C (98.3 °F)   TempSrc: Oral   Weight: 102 kg (224 lb)   Height: 1.803 m (5' 11\")     Physical Exam   Constitutional: He appears well-developed and well-nourished.   HENT:   Head: Normocephalic.   Mouth/Throat: Oropharynx is clear and moist.   Eyes: Pupils are equal, round, and reactive to light.   Cardiovascular: Normal rate, regular rhythm and normal heart sounds.    Pulmonary/Chest: Effort normal. He has decreased breath sounds in the right lower field. He has no rales.   Nursing note and vitals reviewed.      Assessment/Plan       Problem List Items Addressed This Visit        Respiratory    Acquired elevated diaphragm - Primary       Circulatory    Essential hypertension       Hematologic    Cold agglutinin disease (CMS/HCC) (HCC)      has Sniff test next month  Most of his care is in the [facility] system.  We talked about sharing of information.  We have discussed his elevated diaphragm.  Continue doing stretching and deep breathing exercises.  We have discussed his cold agglutinin disease at length.  We have discussed how cold can impact his blood count and platelets.  He has blood work planned for the fall.  Right now his CBC is stable. >505 time counseling  "

## 2019-07-05 NOTE — TELEPHONE ENCOUNTER
pls call radiology dept and ask about the previously noted aortic dilatation. No mention on the recent study.

## 2019-07-30 ENCOUNTER — TELEPHONE (OUTPATIENT)
Dept: FAMILY MEDICINE | Facility: CLINIC | Age: 74
End: 2019-07-30

## 2019-07-30 ENCOUNTER — PATIENT OUTREACH (OUTPATIENT)
Dept: CASE MANAGEMENT | Facility: CLINIC | Age: 74
End: 2019-07-30

## 2019-07-30 RX ORDER — GABAPENTIN 300 MG/1
1 CAPSULE ORAL EVERY 8 HOURS
COMMUNITY
Start: 2019-07-30 | End: 2022-01-04

## 2019-07-30 RX ORDER — CEFTRIAXONE SODIUM 10 G/100ML
2 INJECTION, POWDER, FOR SOLUTION INTRAVENOUS DAILY
COMMUNITY
Start: 2019-07-30 | End: 2019-09-05

## 2019-07-30 RX ORDER — CELECOXIB 200 MG/1
1 CAPSULE ORAL EVERY 12 HOURS
COMMUNITY
Start: 2019-07-30 | End: 2022-01-04

## 2019-07-30 NOTE — TELEPHONE ENCOUNTER
Please relay the following unread portal message to the patient.   From  Sisi Britt MD To  Tobias Little Sent  7/9/2019  5:30 PM   Hi Tobias,   I asked the radiologist to look at the aorta specifically on the recent CAT scan.  Radiologist issued an addendum describing the aorta as top normal in size at 3.9 cm which is technically not above the normal range. This is reassuring.   Sisi Britt MD    [facility] [place]     [facility] Chart User Last Read On   Tobias Little Not Read

## 2019-07-30 NOTE — PROGRESS NOTES
NAME: Tobias Little    MRN: [id number]    YOB: 1945    EVENT DETAILS    Discharging Facility: Other ([facility] )  Date of Admission: 07/24/19  Date of Discharge: 07/29/19  Discharge Instructions Reviewed?: Yes         Reason for Admission: Infection of total right knee replacement       HPI: spoke to pt and his spouse. Pt presented to hospital with fever, chills and right knee pain with inability to wt bear. Pt had aspiration of knee on 7/24 with 99K WBC and 95% polys. He went to OR 7/25 for washout with poly exchange. Pt seen by ID and was on IV ABX in hospital. PICC line placed and pt will be on IV ABX for 6 weeks. Pt was dc to home with home care services with [facility]  and [facility].     Pt reports that he is admin IV ABX w/o diff, PICC line dsg is C/D/I. VN will be out today. Pt reports that pain has decreased and he is ambul in home w/o walker and is taking Tylenol for pain. Pt denies f/c, CP, palps, N/V/D or constipation. CC reviewed to monitor for s/s infection and when to call MD. CC reviewed meds and pt is taking as directed. Pt's spouse will be calling PCP office in 2 weeks to schedule f/u appt.         MEDICATION REVIEW:    Reported by:: Patient  Prescriptions Filled?: Yes     Was a medication discrepancy indentified?: No     Medication understanding?: Yes     Medication Adherence?: Yes     Any side effects from medication?: No       Medication review Reviewed, see medication history    Additional Comments: DC Flecainide    FOLLOW-UP TESTS/PROCEDURES:    8/21 Dr. Ayala (ID)  Dr. Yepez [facility]     PCP- spouse will call office to schedule f/u     HOME MANAGEMENT    Living Arrangement: Spouse or Significant Other  Home Monitoring: Other (Temp )    HOME CARE SERVICES    Receiving Home Care Services: Yes  Type of Home Care Services: Home Nursing  Home Care Agency: [facility] and [facility]         DURABLE MEDICAL EQUIPMENT    Durable Medical  Equipment: Bedside commode, Cane, Walker (PICC line supplies)  Oxygen Use: No            BARRIERS TO CARE    SELF-CARE    Living Arrangement: Spouse or Significant Other       SOCIOECONOMIC    Financial Problems?: No     Transportation Issues?: No            INTERVENTION/CARE COORDINATION    Interventions/ Care Coordination: Encouraged patient to schedule with the PCP/Specialist, Addressed a knowledge deficit    PLAN OF CARE:    Reviewed signs/symptoms of worsening condition or complication that necessitate a call to the Physician's office.  Educated patient on access to care.  RN phone number given for future care management needs.       Kassandra Cason RN  [phone]

## 2019-07-31 NOTE — TELEPHONE ENCOUNTER
Spoke with Sincere with Dr. 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    Pt will need weekly blood work for at least a month. Sincere will make arrangements the either [facility] or [facility].

## 2019-07-31 NOTE — TELEPHONE ENCOUNTER
Spoke with patient in regards to 's message, he had no questions at this time and will call if any should come up or if an appointment is needed.

## 2019-08-28 ENCOUNTER — TELEPHONE (OUTPATIENT)
Dept: FAMILY MEDICINE | Facility: CLINIC | Age: 74
End: 2019-08-28

## 2019-08-28 NOTE — TELEPHONE ENCOUNTER
Pt's wife Jaye called to advise pt is not feeling like himself and is fatigued.  Samantha checked with MA and per Dr. Reese it is suggested pt go to ER since he has a pik line and they can draw tests immediately.  Jaye refuses to take him, she claims he just had blood work done this week.     She then said I was alarming her about getting blood work done and asked to speak with someone else.    I transferred call to [facility].

## 2019-08-28 NOTE — TELEPHONE ENCOUNTER
"Spoke with patients wife on the phone she was stating patient has had multiples surgery recently, has a PICC line and she has noticed he has been feeling fatigued and \"not like himself\".  Asked when it started and she said about 4-3 days ago, after his last blood work.    After speaking with  and considering patient has a PICC line and isn't feeling like himself she suggested he goes to the ER and get some blood work done so they can check all his levels right away.    After informing patient's wife of message she got angry and said \"she doesn't understand why being his doctors office we cant just see him and not send him to the ER\". Tried to explained that because of his PICC line and multiple surgeries, if she is worried he isn't acting like him self the doctor is as well. Patient's wife stated she will not take him to the ER because it \"isnt necessary\" he has a blood mutation that the only place they can take his blood is at [place], she doesn't want to \"waste their time and put him through that\" just because he's feeling tired she just \"wants him to see his doctor like he should be able to do\"    Patient gets blood work weekly and will be getting some tomorrow at [facility]. So she \"sees no reason why the ER is needed when the blood will be done in the morning\"    Patient's wife would not listen to any other reason and yelled she will just be here with him tomorrow around 1 pm and that's that.  "

## 2019-11-07 DIAGNOSIS — E78.00 HYPERCHOLESTEROLEMIA: ICD-10-CM

## 2020-04-15 ENCOUNTER — TELEPHONE (OUTPATIENT)
Dept: FAMILY MEDICINE | Facility: CLINIC | Age: 75
End: 2020-04-15

## 2020-04-15 NOTE — TELEPHONE ENCOUNTER
Spoke to pt, Dr Reese leaving the practice at the end of June.  The doctor is giving her patients the opportunity to do a telemed call to discuss any issues or med refills before she leaves.  Patient did not need a telemed call.

## 2020-04-17 DIAGNOSIS — E78.00 HYPERCHOLESTEROLEMIA: ICD-10-CM

## 2020-04-17 RX ORDER — ROSUVASTATIN CALCIUM 10 MG/1
10 TABLET, COATED ORAL DAILY
Qty: 90 TABLET | Refills: 1 | Status: SHIPPED | OUTPATIENT
Start: 2020-04-17 | End: 2020-10-15

## 2020-04-17 NOTE — TELEPHONE ENCOUNTER
Hasn't been seen since 3/26/19. Looks like he is following up with a Cardiologist from [place] as well. Doesn't have a follow up appointment scheduled

## 2020-04-23 ENCOUNTER — TELEPHONE (OUTPATIENT)
Dept: FAMILY MEDICINE | Facility: CLINIC | Age: 75
End: 2020-04-23

## 2020-10-15 DIAGNOSIS — E78.00 HYPERCHOLESTEROLEMIA: ICD-10-CM

## 2020-10-15 RX ORDER — ROSUVASTATIN CALCIUM 10 MG/1
TABLET, COATED ORAL
Qty: 90 TABLET | Refills: 1 | Status: SHIPPED | OUTPATIENT
Start: 2020-10-15 | End: 2021-04-23

## 2021-04-23 DIAGNOSIS — E78.00 HYPERCHOLESTEROLEMIA: ICD-10-CM

## 2021-04-23 RX ORDER — ROSUVASTATIN CALCIUM 10 MG/1
TABLET, COATED ORAL
Qty: 90 TABLET | Refills: 1 | Status: SHIPPED | OUTPATIENT
Start: 2021-04-23 | End: 2021-10-27

## 2021-05-20 ENCOUNTER — TELEPHONE (OUTPATIENT)
Dept: SCHEDULING | Facility: CLINIC | Age: 76
End: 2021-05-20

## 2021-05-20 DIAGNOSIS — E78.2 MIXED HYPERLIPIDEMIA: Primary | ICD-10-CM

## 2021-05-20 NOTE — TELEPHONE ENCOUNTER
Pt spouse calling and would like sooner appt    If possible please schedule same day with Spouse chacorta Alba can be reached at [phone]

## 2021-05-20 NOTE — TELEPHONE ENCOUNTER
Spoke with wife chacorta they will keep appt and she is asking for lab slips for herself and him to do before coming in 1/4/2021

## 2021-05-20 NOTE — TELEPHONE ENCOUNTER
Pt's spouse, Jaye returning call.     Jaye will be available after 2:30pm.     No Appt to offer at [facility]--Pt is currently scheduled at [facility] office.     Jaye can be reached @ [phone].     TY .

## 2021-10-22 DIAGNOSIS — E78.00 HYPERCHOLESTEROLEMIA: ICD-10-CM

## 2021-10-27 RX ORDER — ROSUVASTATIN CALCIUM 10 MG/1
TABLET, COATED ORAL
Qty: 90 TABLET | Refills: 1 | Status: SHIPPED | OUTPATIENT
Start: 2021-10-27 | End: 2022-04-18

## 2021-10-27 NOTE — TELEPHONE ENCOUNTER
DR KENDRICK pt pharmacy req refill Rosuvastatin    Last OV 2018, has appt 1/4/2022    Not escribed per protocol    Please advise    Thank you

## 2021-12-20 ENCOUNTER — TELEPHONE (OUTPATIENT)
Dept: SCHEDULING | Facility: CLINIC | Age: 76
End: 2021-12-20
Payer: COMMERCIAL

## 2021-12-29 ENCOUNTER — APPOINTMENT (OUTPATIENT)
Dept: LAB | Facility: CLINIC | Age: 76
End: 2021-12-29
Attending: NURSE PRACTITIONER
Payer: COMMERCIAL

## 2021-12-29 DIAGNOSIS — E78.2 MIXED HYPERLIPIDEMIA: ICD-10-CM

## 2021-12-29 LAB
ALBUMIN SERPL-MCNC: 4 G/DL (ref 3.4–5)
ALP SERPL-CCNC: 73 IU/L (ref 35–126)
ALT SERPL-CCNC: 21 IU/L (ref 16–63)
ANION GAP SERPL CALC-SCNC: 13 MEQ/L (ref 3–15)
AST SERPL-CCNC: 24 IU/L (ref 15–41)
BILIRUB SERPL-MCNC: 1.1 MG/DL (ref 0.3–1.2)
BUN SERPL-MCNC: 14 MG/DL (ref 8–20)
CALCIUM SERPL-MCNC: 9.6 MG/DL (ref 8.9–10.3)
CHLORIDE SERPL-SCNC: 100 MEQ/L (ref 98–109)
CHOLEST SERPL-MCNC: 149 MG/DL
CO2 SERPL-SCNC: 29 MEQ/L (ref 22–32)
CREAT SERPL-MCNC: 1.1 MG/DL (ref 0.8–1.3)
GFR SERPL CREATININE-BSD FRML MDRD: >60 ML/MIN/1.73M*2
GLUCOSE SERPL-MCNC: 87 MG/DL (ref 70–99)
HDLC SERPL-MCNC: 62 MG/DL
HDLC SERPL: 2.4 {RATIO}
LDLC SERPL CALC-MCNC: 71 MG/DL
NONHDLC SERPL-MCNC: 87 MG/DL
POTASSIUM SERPL-SCNC: 4.5 MEQ/L (ref 3.6–5.1)
PROT SERPL-MCNC: 6.2 G/DL (ref 6–8.2)
SODIUM SERPL-SCNC: 142 MEQ/L (ref 136–144)
TRIGL SERPL-MCNC: 80 MG/DL (ref 30–149)

## 2021-12-29 PROCEDURE — 36415 COLL VENOUS BLD VENIPUNCTURE: CPT

## 2021-12-29 PROCEDURE — 80053 COMPREHEN METABOLIC PANEL: CPT

## 2021-12-29 PROCEDURE — 80061 LIPID PANEL: CPT

## 2022-01-03 NOTE — PROGRESS NOTES
rprise ID: [id number]    2022    Dakotah Villalba MD    Re:  TOBIAS LITTLE  :  1945    Dear Rai:    It was my pleasure to see Tobias Little in the cardiovascular office today.  We will follow Tobias for  subclinical coronary artery disease with an Agatston score of 648 with two vessel involvement, hypertension and polygenic hypercholesterolemia.    He recently underwent right total knee replacement  at the [facility].  This was complicated by postoperative paroxysmal SVT.  He was discharged on flecainide 100 mg twice daily and Toprol- mg daily.  Ultimately he underwent a Medtronic loop recorder and subsequently ablation for atrial tachyarrhythmias.  He had complications with infection of the Medtronic loop recorder and is recently implanted right total knee.  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  He is now in rate controlled atrial flutter and on a combination of Toprol-XL and Eliquis.     He is now followed by Dr. Juan C Lynn at [place].  Recent discussions indicate that he is satisfied with rate control of his atrial flutter and anticoagulation with the use of Eliquis.  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      At this time we discussed removal of his Medtronic loop recorder as it is not serving any real purpose.  At this time he will remain on Eliquis and not pursue a watchman device. He does have a remote history of subdural hematoma in .     We reviewed his blood pressure recordings over the past few months.  We agreed with the use of lisinopril 40 mg at bedtime and amlodipine at a relatively low dose of 2.5 mg in the morning.  His blood pressures have been reading 130/80.  He is no longer hydrochlorothiazide as he developed gout with the thiazide diuretic.    We reviewed his prevention program in detail.   His last lipid panel is noted below and is excellent.  Presently, he is on Crestor 10 mg daily.    There is evidence of insulin resistance.  We recommended a weight reduction diet.  His goal weight is 220 pounds.  We discussed a low-carbohydrate, low-saturated fat Mediterranean Diet.    PAST MEDICAL HISTORY:   1.  Subclinical coronary artery disease, coronary calcium score 648 with two vessel involvement including the LAD and RCA, March 2016.  Stress echo was performed in April 2016 which was negative for myocardial ischemia or scar, normal left ventricular size, preserved systolic function.   2.  dyslipidemia consistent with mild polygenic hypercholesterolemia, total cholesterol 198, , HDL 65, triglycerides 52, apoB 92, LDL-P 1496, normal LP(a)  3.  MTHFR polymorphism with hyperhomocystinemia  4.  endothelial dysfunction  5.  insulin resistance  6.  Hypertension  7.  abnormal EKG with incomplete right bundle branch block, frequent PVC's  8.  subdural hematoma in 2008 secondary to traumatic injury head injury secondary to a steel pipe.  Complicated by paroxysmal atrial fibrillation.  Now on Eliquis for persistent atrial flutter.  9.  Dilated aortic root at 4.4 cm.  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  Follow at the [facility].  11.  Postoperative PSVT.  12.  Status post ablation 2019 for atrial fibrillation and PSVT.  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  Now in persistent atrial flutter with brief periods of more rapid atrial fibrillation.  13.  Status post ablation of ventricular arrhythmia LVOT.  2019    PAST SURGICAL HISTORY:    Neurosurgery for subdural hematoma 2008  appendectomy.  Right total knee replacement 2/18/2019  Laparoscopic hernia repair 2021    CURRENT MEDICATIONS:       Current Outpatient Medications:   •  amLODIPine 2.5 mg tablet, Take 2.5 mg by mouth daily., Disp: , Rfl:   •  apixaban (ELIQUIS) 5 mg tablet, Take 5 mg by mouth 2 (two) times a  day., Disp: , Rfl:   •  cholecalciferol, vitamin D3, (VITAMIN D3 ORAL), Take 5,000 IU by mouth daily. Vitamin D Supreme  , Disp: , Rfl:   •  coenzyme Q10 (CO Q-10) 10 mg capsule, Take 200 mg by mouth daily.  , Disp: , Rfl:   •  flaxseed oiL oil, daily., Disp: , Rfl:   •  latanoprost (XALATAN) 0.005 % ophthalmic solution, Administer 1 drop into affected eye(s) nightly. Into left eye, Disp: , Rfl: 6  •  lisinopriL 40 mg tablet, Take 40 mg by mouth daily., Disp: , Rfl:   •  metoprolol succinate XL (TOPROL-XL) 100 mg 24 hr tablet, Take 100 mg by mouth daily. , Disp: , Rfl: 3  •  quercetin, Take 500 mg by mouth., Disp: , Rfl:   •  rosuvastatin 10 mg tablet, TAKE 1 TABLET BY MOUTH EVERY DAY, Disp: 90 tablet, Rfl: 1      SUPPLEMENTS:  Coenzyme-Q10 100 mg daily, vitamin-D 1771-4641 international units daily, omega-3 fish oil 1000 mg daily, multiple vitamin.    ALLERGIES:  No known drug allergies.    FAMILY HISTORY:  Father  young at age 55 of lung cancer.  Mother has a pacemaker.    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  His wife's name is Jaye.  They have three children.  He has seven grandchildren.  He smoked 38 years ago for five years.  He rarely drinks alcohol.  He has an active lifestyle.  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    REVIEW OF SYSTEMS: Tobias is now in persistent atrial flutter with rate control.    PHYSICAL EXAMINATION:    Vitals:    22 0935   BP: 128/80   Pulse: 98   SpO2: 99%     Wt Readings from Last 3 Encounters:   22 103 kg (227 lb)   19 102 kg (224 lb)   06/10/19 101 kg (223 lb)     BP Readings from Last 3 Encounters:   22 128/80   19 132/76   06/10/19 130/82     HEENT:  Unremarkable.  Neck:  Supple, no JVD.  Lungs:  Clear to auscultation and percussion.  Cardiac:  Regular rate and rhythm.  Abdomen:  Soft, bowel sounds present, no organomegaly.  Extremities:  No  edema, pulses intact.  Skin:  Warm and dry.  Neuro:  Alert and oriented X 3.    LABORATORY DATA:      EKG:  Atrial Flutter    Coronary calcium score:  648 with two vessel involvement.      Stress echo was negative for myocardial ischemia or scar, normal left ventricular size, preserved systolic function.    Myocardial perfusion scan February 2019: Normal    Echocardiogram February 2019: Normal left ventricular size preserved function.  Mild mitral and tricuspid regurgitation.  Ascending aorta measured 4.2 cm.    Echocardiogram September 2021:  • The left ventricle is normal in size. Normal left ventricular wall thickness. There are no segmental wall motion abnormalities. Normal left ventricular ejection fraction. The left ventricular ejection fraction by visual estimate is 65% to 70%.  • Unable to assess LV diastolic function due to arrhythmia.  • The right ventricle is normal in size. There is normal function of the right ventricle.  • There is no mitral regurgitation. There is no mitral stenosis.  • There is no aortic stenosis. There is mild aortic regurgitation.  • There is trace tricuspid regurgitation.  • The aortic root is mildly enlarged. The aorta measures 4.2 cm at the sinus of Valsalva. The proximal segment of the ascending aorta is mildly enlarged. The proximal segment of the ascending aorta measures 4.0 cm.      Lipid panel:   Component      Latest Ref Rng & Units 6/11/2018 11/9/2018 12/29/2021   Cholesterol      <=200 mg/dL 180 146 149   HDL      >=45 mg/dL 75 68 62   LDL Calculated      <=100 mg/dL 88 65 71   RISK      <=5.0 2.4 2.1 2.4   Triglycerides      30 - 149 mg/dL 85 65 80   Non-HDL, Calculated      mg/dL 105 78 87     IMPRESSIONS/RECOMMENDATIONS:  1. Coronary artery disease.  The patient's stress test was negative for ischemia.  He is not on aspirin as he is on Eliquis.  2. Hypertension.  Continue Toprol-XL and lisinopril and amlodipine  3. Polygenic hypercholesterolemia.  The patient will  continue his Crestor  10 mg daily.  Our goal is an LDL below 70.  4. MTHFR polymorphism.  Continue B-complex with L5 methyl folate.  5. Insulin resistance.  Continue low carbohydrate low saturated fat Mediterranean diet and exercise program.  6. Obesity.   We discussed a low-carbohydrate, low-saturated fat Mediterranean Diet and an exercise walking program.  7.         Persistent atrial flutter.  Status post A. fib ablation complicated by phrenic nerve paralysis.  Patient is now resigned to rate control and anticoagulation for his atrial flutter.  He is followed by Dr. Juan C Lynn at [facility].  He no longer needs a Medtronic loop recorder.  8.         History of subdural hematoma.  This was secondary to head trauma and was not spontaneous.  He is on Eliquis now for his atrial flutter.  9.         History of phrenic nerve injury with diaphragmatic paralysis.  Resolved.  10.       History of total knee replacement.  Followed [facility].  11.       History of cold agglutinins.  Followed [facility].    Summary:Jess Collado is demonstrating cardiovascular stability.  We will see him back in the office in 6 months.  We will continue to follow him for his cardiovascular prevention program.  We will coordinate with his electrophysiologist Dr. Juan C Lynn.     This was a 30 minute patient encounter with greater than 50% time spent in care coordination and counseling.     Sincerely,      Manuel Nichole MD   1/4/2022

## 2022-01-04 ENCOUNTER — OFFICE VISIT (OUTPATIENT)
Dept: CARDIOLOGY | Facility: CLINIC | Age: 77
End: 2022-01-04
Payer: COMMERCIAL

## 2022-01-04 VITALS
HEIGHT: 71 IN | SYSTOLIC BLOOD PRESSURE: 128 MMHG | DIASTOLIC BLOOD PRESSURE: 80 MMHG | WEIGHT: 227 LBS | OXYGEN SATURATION: 99 % | BODY MASS INDEX: 31.78 KG/M2 | HEART RATE: 98 BPM

## 2022-01-04 DIAGNOSIS — E78.00 HYPERCHOLESTEROLEMIA: ICD-10-CM

## 2022-01-04 DIAGNOSIS — I25.10 CORONARY ARTERY DISEASE INVOLVING NATIVE CORONARY ARTERY OF NATIVE HEART WITHOUT ANGINA PECTORIS: ICD-10-CM

## 2022-01-04 DIAGNOSIS — I47.10 SVT (SUPRAVENTRICULAR TACHYCARDIA) (CMS/HCC): Primary | ICD-10-CM

## 2022-01-04 DIAGNOSIS — I47.19 ATRIAL TACHYCARDIA (CMS/HCC): ICD-10-CM

## 2022-01-04 PROCEDURE — 3008F BODY MASS INDEX DOCD: CPT | Performed by: INTERNAL MEDICINE

## 2022-01-04 PROCEDURE — 99214 OFFICE O/P EST MOD 30 MIN: CPT | Performed by: INTERNAL MEDICINE

## 2022-01-04 RX ORDER — AMLODIPINE BESYLATE 5 MG/1
5 TABLET ORAL DAILY
COMMUNITY

## 2022-01-04 RX ORDER — LISINOPRIL 40 MG/1
40 TABLET ORAL DAILY
COMMUNITY

## 2022-01-04 NOTE — LETTER
2022     Dakotah Villalba MD  [address]  [place] [address]    Patient: Tobias Valverde  YOB: 1945  Date of Visit: 2022      Dear Dr. Villalba:    Thank you for referring Tobias Valverde to me for evaluation. Below are my notes for this consultation.    If you have questions, please do not hesitate to call me. I look forward to following your patient along with you.         Sincerely,        Manuel Nichole MD        CC: No Recipients  Manuel Nichole MD  2022 11:43 AM  Sign when Signing Visit  rprise ID: [id number]    2022    Dakotah Villalba MD    Re:  TOBIAS VALVERDE  :  1945    Dear Rai:    It was my pleasure to see Tobias Valverde in the cardiovascular office today.  We will follow Tobias for  subclinical coronary artery disease with an Agatston score of 648 with two vessel involvement, hypertension and polygenic hypercholesterolemia.    He recently underwent right total knee replacement 2019 at the [facility].  This was complicated by postoperative paroxysmal SVT.  He was discharged on flecainide 100 mg twice daily and Toprol- mg daily.  Ultimately he underwent a Medtronic loop recorder and subsequently ablation for atrial tachyarrhythmias.  He had complications with infection of the Medtronic loop recorder and is recently implanted right total knee.  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  He is now in rate controlled atrial flutter and on a combination of Toprol-XL and Eliquis.     He is now followed by Dr. Juan C Lynn at [place].  Recent discussions indicate that he is satisfied with rate control of his atrial flutter and anticoagulation with the use of Eliquis.  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      At this time we discussed removal of his  [facility] loop recorder as it is not serving any real purpose.  At this time he will remain on Eliquis and not pursue a watchman device. He does have a remote history of subdural hematoma in 2008.     We reviewed his blood pressure recordings over the past few months.  We agreed with the use of lisinopril 40 mg at bedtime and amlodipine at a relatively low dose of 2.5 mg in the morning.  His blood pressures have been reading 130/80.  He is no longer hydrochlorothiazide as he developed gout with the thiazide diuretic.    We reviewed his prevention program in detail.  His last lipid panel is noted below and is excellent.  Presently, he is on Crestor 10 mg daily.    There is evidence of insulin resistance.  We recommended a weight reduction diet.  His goal weight is 220 pounds.  We discussed a low-carbohydrate, low-saturated fat Mediterranean Diet.    PAST MEDICAL HISTORY:   1.  Subclinical coronary artery disease, coronary calcium score 648 with two vessel involvement including the LAD and RCA, March 2016.  Stress echo was performed in April 2016 which was negative for myocardial ischemia or scar, normal left ventricular size, preserved systolic function.   2.  dyslipidemia consistent with mild polygenic hypercholesterolemia, total cholesterol 198, , HDL 65, triglycerides 52, apoB 92, LDL-P 1496, normal LP(a)  3.  MTHFR polymorphism with hyperhomocystinemia  4.  endothelial dysfunction  5.  insulin resistance  6.  Hypertension  7.  abnormal EKG with incomplete right bundle branch block, frequent PVC's  8.  subdural hematoma in 2008 secondary to traumatic injury head injury secondary to a steel pipe.  Complicated by paroxysmal atrial fibrillation.  Now on Eliquis for persistent atrial flutter.  9.  Dilated aortic root at 4.4 cm.  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  Follow at the [facility].  11.  Postoperative PSVT.  12.  Status post ablation 2019 for atrial fibrillation and PSVT.  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  Now in persistent atrial flutter with brief periods of more rapid atrial fibrillation.  13.  Status post ablation of ventricular arrhythmia LVOT.  2019    PAST SURGICAL HISTORY:    Neurosurgery for subdural hematoma   appendectomy.  Right total knee replacement 2019  Laparoscopic hernia repair     CURRENT MEDICATIONS:       Current Outpatient Medications:   •  amLODIPine 2.5 mg tablet, Take 2.5 mg by mouth daily., Disp: , Rfl:   •  apixaban (ELIQUIS) 5 mg tablet, Take 5 mg by mouth 2 (two) times a day., Disp: , Rfl:   •  cholecalciferol, vitamin D3, (VITAMIN D3 ORAL), Take 5,000 IU by mouth daily. Vitamin D Supreme  , Disp: , Rfl:   •  coenzyme Q10 (CO Q-10) 10 mg capsule, Take 200 mg by mouth daily.  , Disp: , Rfl:   •  flaxseed oiL oil, daily., Disp: , Rfl:   •  latanoprost (XALATAN) 0.005 % ophthalmic solution, Administer 1 drop into affected eye(s) nightly. Into left eye, Disp: , Rfl: 6  •  lisinopriL 40 mg tablet, Take 40 mg by mouth daily., Disp: , Rfl:   •  metoprolol succinate XL (TOPROL-XL) 100 mg 24 hr tablet, Take 100 mg by mouth daily. , Disp: , Rfl: 3  •  quercetin, Take 500 mg by mouth., Disp: , Rfl:   •  rosuvastatin 10 mg tablet, TAKE 1 TABLET BY MOUTH EVERY DAY, Disp: 90 tablet, Rfl: 1      SUPPLEMENTS:  Coenzyme-Q10 100 mg daily, vitamin-D 2751-0817 international units daily, omega-3 fish oil 1000 mg daily, multiple vitamin.    ALLERGIES:  No known drug allergies.    FAMILY HISTORY:  Father  young at age 55 of lung cancer.  Mother has a pacemaker.    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  His wife's name is Jaye.  They have three children.  He has seven grandchildren.  He smoked 38 years ago for five years.  He rarely drinks alcohol.  He has an active lifestyle.  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    REVIEW OF  SYSTEMS: Tobias is now in persistent atrial flutter with rate control.    PHYSICAL EXAMINATION:    Vitals:    01/04/22 0935   BP: 128/80   Pulse: 98   SpO2: 99%     Wt Readings from Last 3 Encounters:   01/04/22 103 kg (227 lb)   07/05/19 102 kg (224 lb)   06/10/19 101 kg (223 lb)     BP Readings from Last 3 Encounters:   01/04/22 128/80   07/05/19 132/76   06/10/19 130/82     HEENT:  Unremarkable.  Neck:  Supple, no JVD.  Lungs:  Clear to auscultation and percussion.  Cardiac:  Regular rate and rhythm.  Abdomen:  Soft, bowel sounds present, no organomegaly.  Extremities:  No edema, pulses intact.  Skin:  Warm and dry.  Neuro:  Alert and oriented X 3.    LABORATORY DATA:      EKG:  Atrial Flutter    Coronary calcium score:  648 with two vessel involvement.      Stress echo was negative for myocardial ischemia or scar, normal left ventricular size, preserved systolic function.    Myocardial perfusion scan February 2019: Normal    Echocardiogram February 2019: Normal left ventricular size preserved function.  Mild mitral and tricuspid regurgitation.  Ascending aorta measured 4.2 cm.    Echocardiogram September 2021:  • The left ventricle is normal in size. Normal left ventricular wall thickness. There are no segmental wall motion abnormalities. Normal left ventricular ejection fraction. The left ventricular ejection fraction by visual estimate is 65% to 70%.  • Unable to assess LV diastolic function due to arrhythmia.  • The right ventricle is normal in size. There is normal function of the right ventricle.  • There is no mitral regurgitation. There is no mitral stenosis.  • There is no aortic stenosis. There is mild aortic regurgitation.  • There is trace tricuspid regurgitation.  • The aortic root is mildly enlarged. The aorta measures 4.2 cm at the sinus of Valsalva. The proximal segment of the ascending aorta is mildly enlarged. The proximal segment of the ascending aorta measures 4.0 cm.      Lipid panel:    Component      Latest Ref Rng & Units 6/11/2018 11/9/2018 12/29/2021   Cholesterol      <=200 mg/dL 180 146 149   HDL      >=45 mg/dL 75 68 62   LDL Calculated      <=100 mg/dL 88 65 71   RISK      <=5.0 2.4 2.1 2.4   Triglycerides      30 - 149 mg/dL 85 65 80   Non-HDL, Calculated      mg/dL 105 78 87     IMPRESSIONS/RECOMMENDATIONS:  1. Coronary artery disease.  The patient's stress test was negative for ischemia.  He is not on aspirin as he is on Eliquis.  2. Hypertension.  Continue Toprol-XL and lisinopril and amlodipine  3. Polygenic hypercholesterolemia.  The patient will continue his Crestor  10 mg daily.  Our goal is an LDL below 70.  4. MTHFR polymorphism.  Continue B-complex with L5 methyl folate.  5. Insulin resistance.  Continue low carbohydrate low saturated fat Mediterranean diet and exercise program.  6. Obesity.   We discussed a low-carbohydrate, low-saturated fat Mediterranean Diet and an exercise walking program.  7.         Persistent atrial flutter.  Status post A. fib ablation complicated by phrenic nerve paralysis.  Patient is now resigned to rate control and anticoagulation for his atrial flutter.  He is followed by Dr. Juan C Lynn at [facility].  He no longer needs a Medtronic loop recorder.  8.         History of subdural hematoma.  This was secondary to head trauma and was not spontaneous.  He is on Eliquis now for his atrial flutter.  9.         History of phrenic nerve injury with diaphragmatic paralysis.  Resolved.  10.       History of total knee replacement.  Followed [facility].  11.       History of cold agglutinins.  Followed [facility].    Summary:.  Tobias is demonstrating cardiovascular stability.  We will see him back in the office in 6 months.  We will continue to follow him for his cardiovascular prevention program.  We will coordinate with his electrophysiologist Dr. Juan C Lynn.     This was a 30 minute patient encounter  with greater than 50% time spent in care coordination and counseling.     Sincerely,      Manuel Nichole MD   1/4/2022

## 2022-07-12 ENCOUNTER — TELEPHONE (OUTPATIENT)
Dept: SCHEDULING | Facility: CLINIC | Age: 77
End: 2022-07-12
Payer: COMMERCIAL

## 2022-07-12 NOTE — TELEPHONE ENCOUNTER
Medical Records Request     Name of caller: Tobias Little    Relationship to patient: self    Who’s requesting copy of medical records? Patient    Records being requested: Patient requesting lab slips to be mailed to home.    Best contact number: [phone]

## 2022-07-19 ENCOUNTER — TELEPHONE (OUTPATIENT)
Dept: CARDIOLOGY | Facility: CLINIC | Age: 77
End: 2022-07-19
Payer: COMMERCIAL

## 2022-07-19 NOTE — TELEPHONE ENCOUNTER
Left voice mail regarding completion of lab work for his upcoming appointment with Dr. Nichole on 7/28/22.

## 2022-07-21 ENCOUNTER — APPOINTMENT (OUTPATIENT)
Dept: LAB | Facility: CLINIC | Age: 77
End: 2022-07-21
Attending: INTERNAL MEDICINE
Payer: COMMERCIAL

## 2022-07-21 DIAGNOSIS — E78.00 HYPERCHOLESTEROLEMIA: ICD-10-CM

## 2022-07-21 LAB
ALBUMIN SERPL-MCNC: 3.9 G/DL (ref 3.4–5)
ALP SERPL-CCNC: 76 IU/L (ref 35–126)
ALT SERPL-CCNC: 24 IU/L (ref 16–63)
ANION GAP SERPL CALC-SCNC: 8 MEQ/L (ref 3–15)
AST SERPL-CCNC: 26 IU/L (ref 15–41)
BILIRUB SERPL-MCNC: 0.7 MG/DL (ref 0.3–1.2)
BUN SERPL-MCNC: 26 MG/DL (ref 8–20)
CALCIUM SERPL-MCNC: 9 MG/DL (ref 8.9–10.3)
CHLORIDE SERPL-SCNC: 104 MEQ/L (ref 98–109)
CHOLEST SERPL-MCNC: 152 MG/DL
CO2 SERPL-SCNC: 27 MEQ/L (ref 22–32)
CREAT SERPL-MCNC: 1.1 MG/DL (ref 0.8–1.3)
GFR SERPL CREATININE-BSD FRML MDRD: >60 ML/MIN/1.73M*2
GLUCOSE SERPL-MCNC: 111 MG/DL (ref 70–99)
HDLC SERPL-MCNC: 62 MG/DL
HDLC SERPL: 2.5 {RATIO}
LDLC SERPL CALC-MCNC: 81 MG/DL
NONHDLC SERPL-MCNC: 90 MG/DL
POTASSIUM SERPL-SCNC: 4.5 MEQ/L (ref 3.6–5.1)
PROT SERPL-MCNC: 6.1 G/DL (ref 6–8.2)
SODIUM SERPL-SCNC: 139 MEQ/L (ref 136–144)
TRIGL SERPL-MCNC: 45 MG/DL (ref 30–149)

## 2022-07-21 PROCEDURE — 80061 LIPID PANEL: CPT

## 2022-07-21 PROCEDURE — 36415 COLL VENOUS BLD VENIPUNCTURE: CPT

## 2022-07-21 PROCEDURE — 80053 COMPREHEN METABOLIC PANEL: CPT

## 2022-07-28 ENCOUNTER — OFFICE VISIT (OUTPATIENT)
Dept: CARDIOLOGY | Facility: CLINIC | Age: 77
End: 2022-07-28
Payer: COMMERCIAL

## 2022-07-28 VITALS
OXYGEN SATURATION: 98 % | HEIGHT: 71 IN | DIASTOLIC BLOOD PRESSURE: 88 MMHG | HEART RATE: 78 BPM | BODY MASS INDEX: 31.64 KG/M2 | SYSTOLIC BLOOD PRESSURE: 126 MMHG | WEIGHT: 226 LBS

## 2022-07-28 DIAGNOSIS — I25.10 CORONARY ARTERY DISEASE INVOLVING NATIVE CORONARY ARTERY OF NATIVE HEART WITHOUT ANGINA PECTORIS: Primary | ICD-10-CM

## 2022-07-28 PROCEDURE — 3008F BODY MASS INDEX DOCD: CPT | Performed by: INTERNAL MEDICINE

## 2022-07-28 PROCEDURE — 93000 ELECTROCARDIOGRAM COMPLETE: CPT | Performed by: INTERNAL MEDICINE

## 2022-07-28 PROCEDURE — 99214 OFFICE O/P EST MOD 30 MIN: CPT | Performed by: INTERNAL MEDICINE

## 2022-07-28 RX ORDER — AMOXICILLIN 500 MG/1
CAPSULE ORAL
COMMUNITY
Start: 2022-07-07

## 2022-07-28 NOTE — LETTER
2022     Dakotah Villalba MD  [address]  [place] [address]    Patient: Tobias Valverde  YOB: 1945  Date of Visit: 2022      Dear Dr. Villalba:    Thank you for referring Tobias Valverde to me for evaluation. Below are my notes for this consultation.    If you have questions, please do not hesitate to call me. I look forward to following your patient along with you.         Sincerely,        Manuel Nichole MD        CC: No Recipients  Manuel Nichole MD  2022  9:58 AM  Sign when Signing Visit  rprise ID: [id number]    2022    Dakotah Villalba MD    Re:  TOBIAS VALVERDE  :  1945    Dear Rai:    It was my pleasure to see Tobias Valverde in the cardiovascular office today.  We will follow Tobias for  subclinical coronary artery disease with an Agatston score of 648 with two vessel involvement, hypertension and polygenic hypercholesterolemia.    He  underwent right total knee replacement  at the [facility].  This was complicated by postoperative paroxysmal SVT.  He was discharged on flecainide 100 mg twice daily and Toprol- mg daily.  Ultimately he underwent a Medtronic loop recorder and subsequently ablation for atrial tachyarrhythmias.  He had complications with infection of the Medtronic loop recorder and is recently implanted right total knee.  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  He is now in rate controlled atrial flutter and on a combination of Toprol-XL and Eliquis.     He was followed by Dr. Juan C Lynn at [place].  Recent discussions indicate that he is satisfied with rate control of his atrial flutter and anticoagulation with the use of Eliquis.  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      At this time we discussed removal of his Medtronic loop  recorder as it is not serving any real purpose.  At this time he will remain on Eliquis and not pursue a watchman device. He does have a remote history of subdural hematoma in 2008.     We reviewed his blood pressure recordings over the past few months.  We agreed with the use of lisinopril 40 mg at bedtime and amlodipine at a relatively low dose of 2.5 mg in the morning.  His blood pressures have been reading 130/80.  He is no longer hydrochlorothiazide as he developed gout with the thiazide diuretic.    We reviewed his prevention program in detail.  His last lipid panel is noted below and is excellent.  Presently, he is on Crestor 10 mg daily.    There is evidence of insulin resistance.  We recommended a weight reduction diet.  His goal weight is 220 pounds.  We discussed a low-carbohydrate, low-saturated fat Mediterranean Diet.    PAST MEDICAL HISTORY:   1.  Subclinical coronary artery disease, coronary calcium score 648 with two vessel involvement including the LAD and RCA, March 2016.  Stress echo was performed in April 2016 which was negative for myocardial ischemia or scar, normal left ventricular size, preserved systolic function.   2.  dyslipidemia consistent with mild polygenic hypercholesterolemia, total cholesterol 198, , HDL 65, triglycerides 52, apoB 92, LDL-P 1496, normal LP(a)  3.  MTHFR polymorphism with hyperhomocystinemia  4.  endothelial dysfunction  5.  insulin resistance  6.  Hypertension  7.  abnormal EKG with incomplete right bundle branch block, frequent PVC's  8.  subdural hematoma in 2008 secondary to traumatic injury head injury secondary to a steel pipe.  Complicated by paroxysmal atrial fibrillation.  Now on Eliquis for persistent atrial flutter.  9.  Dilated aortic root at 4.4 cm.  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  Follow at the [facility].  11.  Postoperative PSVT.  12.  Status post ablation 2019 for atrial fibrillation and PSVT.  Complicated by partial diaphragmatic  paralysis secondary to phrenic nerve injury.  Now in persistent atrial flutter with brief periods of more rapid atrial fibrillation.  13.  Status post ablation of ventricular arrhythmia LVOT.  2019    PAST SURGICAL HISTORY:    Neurosurgery for subdural hematoma   appendectomy.  Right total knee replacement 2019  Laparoscopic hernia repair     CURRENT MEDICATIONS:       Current Outpatient Medications:   •  amLODIPine 2.5 mg tablet, Take 2.5 mg by mouth daily., Disp: , Rfl:   •  apixaban (ELIQUIS) 5 mg tablet, Take 5 mg by mouth 2 (two) times a day., Disp: , Rfl:   •  cholecalciferol, vitamin D3, (VITAMIN D3 ORAL), Take 5,000 IU by mouth daily. Vitamin D Supreme  , Disp: , Rfl:   •  coenzyme Q10 (CO Q-10) 10 mg capsule, Take 200 mg by mouth daily.  , Disp: , Rfl:   •  flaxseed oiL oil, daily., Disp: , Rfl:   •  latanoprost (XALATAN) 0.005 % ophthalmic solution, Administer 1 drop into affected eye(s) nightly. Into left eye, Disp: , Rfl: 6  •  lisinopriL 40 mg tablet, Take 40 mg by mouth daily., Disp: , Rfl:   •  metoprolol succinate XL (TOPROL-XL) 100 mg 24 hr tablet, Take 100 mg by mouth daily. , Disp: , Rfl: 3  •  quercetin, Take 500 mg by mouth., Disp: , Rfl:   •  rosuvastatin (CRESTOR) 10 mg tablet, Take 1 tablet (10 mg total) by mouth once daily., Disp: 90 tablet, Rfl: 3      SUPPLEMENTS:  Coenzyme-Q10 100 mg daily, vitamin-D 5720-8854 international units daily, omega-3 fish oil 1000 mg daily, multiple vitamin.    ALLERGIES:  No known drug allergies.    FAMILY HISTORY:  Father  young at age 55 of lung cancer.  Mother has a pacemaker.    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  His wife's name is Jaye.  They have three children.  He has seven grandchildren.  He smoked 38 years ago for five years.  He rarely drinks alcohol.  He has an active lifestyle.  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    REVIEW OF SYSTEMS: Tobias is now in persistent atrial flutter with rate control.    PHYSICAL EXAMINATION:    There were no vitals filed for this visit.  Wt Readings from Last 3 Encounters:   01/04/22 103 kg (227 lb)   07/05/19 102 kg (224 lb)   06/10/19 101 kg (223 lb)     BP Readings from Last 3 Encounters:   01/04/22 128/80   07/05/19 132/76   06/10/19 130/82     HEENT:  Unremarkable.  Neck:  Supple, no JVD.  Lungs:  Clear to auscultation and percussion.  Cardiac:  Regular rate and rhythm.  Abdomen:  Soft, bowel sounds present, no organomegaly.  Extremities:  No edema, pulses intact.  Skin:  Warm and dry.  Neuro:  Alert and oriented X 3.    LABORATORY DATA:      EKG:  Atrial Flutter rate controlled.    Coronary calcium score:  648 with two vessel involvement.      Stress echo was negative for myocardial ischemia or scar, normal left ventricular size, preserved systolic function.    Myocardial perfusion scan February 2019: Normal    Echocardiogram February 2019: Normal left ventricular size preserved function.  Mild mitral and tricuspid regurgitation.  Ascending aorta measured 4.2 cm.    Echocardiogram September 2021:  • The left ventricle is normal in size. Normal left ventricular wall thickness. There are no segmental wall motion abnormalities. Normal left ventricular ejection fraction. The left ventricular ejection fraction by visual estimate is 65% to 70%.  • Unable to assess LV diastolic function due to arrhythmia.  • The right ventricle is normal in size. There is normal function of the right ventricle.  • There is no mitral regurgitation. There is no mitral stenosis.  • There is no aortic stenosis. There is mild aortic regurgitation.  • There is trace tricuspid regurgitation.  • The aortic root is mildly enlarged. The aorta measures 4.2 cm at the sinus of Valsalva. The proximal segment of the ascending aorta is mildly enlarged. The proximal segment of the ascending aorta measures 4.0  cm.      Lipid panel:   Component      Latest Ref Rng & Units 11/9/2018 12/29/2021 7/21/2022   Cholesterol      <=200 mg/dL 146 149 152   HDL      >=45 mg/dL 68 62 62   LDL Calculated      <=100 mg/dL 65 71 81   RISK      <=5.0 2.1 2.4 2.5   Triglycerides      30 - 149 mg/dL 65 80 45   Non-HDL, Calculated      mg/dL 78 87 90     IMPRESSIONS/RECOMMENDATIONS:  1. Coronary artery disease.  The patient's stress test was negative for ischemia.  He is not on aspirin as he is on Eliquis.  2. Hypertension.  Continue Toprol-XL and lisinopril and amlodipine  3. Polygenic hypercholesterolemia.  The patient will continue his Crestor  10 mg daily.  Our goal is an LDL below 70.  4. MTHFR polymorphism.  Continue B-complex with L5 methyl folate.  5. Insulin resistance.  Continue low carbohydrate low saturated fat Mediterranean diet and exercise program.  6. Obesity.   We discussed a low-carbohydrate, low-saturated fat Mediterranean Diet and an exercise walking program.  7.         Persistent atrial flutter.  Status post A. fib ablation complicated by phrenic nerve paralysis.  Patient is now resigned to rate control and anticoagulation for his atrial flutter.  He was followed by Dr. Juan C Lynn at [facility].  He no longer needs a Medtronic loop recorder.  8.         History of subdural hematoma.  This was secondary to head trauma and was not spontaneous.  He is on Eliquis now for his atrial flutter.  9.         History of phrenic nerve injury with diaphragmatic paralysis.  Resolved.  10.       History of total knee replacement.  Followed [facility].  He may need a left total knee replacement.  11.       History of cold agglutinins.  Followed [facility].    Summary:.  Tobias is demonstrating cardiovascular stability.  We will see him back in the office in 6 months.  We will continue to follow him for his cardiovascular prevention program.  We will coordinate with his  electrophysiologist Dr. Juan C Lynn.     This was a 30 minute patient encounter with greater than 50% time spent in care coordination and counseling.     Sincerely,      Manuel Nichole MD   7/28/2022

## 2022-07-28 NOTE — PROGRESS NOTES
rprise ID: [id number]    2022    Dakotah Villalba MD    Re:  TOBIAS LITTLE  :  1945    Dear Rai:    It was my pleasure to see Tobias Little in the cardiovascular office today.  We will follow Tobias for  subclinical coronary artery disease with an Agatston score of 648 with two vessel involvement, hypertension and polygenic hypercholesterolemia.    He  underwent right total knee replacement  at the [facility].  This was complicated by postoperative paroxysmal SVT.  He was discharged on flecainide 100 mg twice daily and Toprol- mg daily.  Ultimately he underwent a Medtronic loop recorder and subsequently ablation for atrial tachyarrhythmias.  He had complications with infection of the Medtronic loop recorder and is recently implanted right total knee.  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  He is now in rate controlled atrial flutter and on a combination of Toprol-XL and Eliquis.     He was followed by Dr. Juan C Lynn at [place].  Recent discussions indicate that he is satisfied with rate control of his atrial flutter and anticoagulation with the use of Eliquis.  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      At this time we discussed removal of his Medtronic loop recorder as it is not serving any real purpose.  At this time he will remain on Eliquis and not pursue a watchman device. He does have a remote history of subdural hematoma in .     We reviewed his blood pressure recordings over the past few months.  We agreed with the use of lisinopril 40 mg at bedtime and amlodipine at a relatively low dose of 2.5 mg in the morning.  His blood pressures have been reading 130/80.  He is no longer hydrochlorothiazide as he developed gout with the thiazide diuretic.    We reviewed his prevention program in detail.  His last  lipid panel is noted below and is excellent.  Presently, he is on Crestor 10 mg daily.    There is evidence of insulin resistance.  We recommended a weight reduction diet.  His goal weight is 220 pounds.  We discussed a low-carbohydrate, low-saturated fat Mediterranean Diet.    PAST MEDICAL HISTORY:   1.  Subclinical coronary artery disease, coronary calcium score 648 with two vessel involvement including the LAD and RCA, March 2016.  Stress echo was performed in April 2016 which was negative for myocardial ischemia or scar, normal left ventricular size, preserved systolic function.   2.  dyslipidemia consistent with mild polygenic hypercholesterolemia, total cholesterol 198, , HDL 65, triglycerides 52, apoB 92, LDL-P 1496, normal LP(a)  3.  MTHFR polymorphism with hyperhomocystinemia  4.  endothelial dysfunction  5.  insulin resistance  6.  Hypertension  7.  abnormal EKG with incomplete right bundle branch block, frequent PVC's  8.  subdural hematoma in 2008 secondary to traumatic injury head injury secondary to a steel pipe.  Complicated by paroxysmal atrial fibrillation.  Now on Eliquis for persistent atrial flutter.  9.  Dilated aortic root at 4.4 cm.  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  11.  Postoperative PSVT.  12.  Status post ablation 2019 for atrial fibrillation and PSVT.  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  Now in persistent atrial flutter with brief periods of more rapid atrial fibrillation.  13.  Status post ablation of ventricular arrhythmia LVOT.  2019    PAST SURGICAL HISTORY:    Neurosurgery for subdural hematoma 2008  appendectomy.  Right total knee replacement 2/18/2019  Laparoscopic hernia repair 2021    CURRENT MEDICATIONS:       Current Outpatient Medications:   •  amLODIPine 2.5 mg tablet, Take 2.5 mg by mouth daily., Disp: , Rfl:   •  apixaban (ELIQUIS) 5 mg tablet, Take 5 mg by mouth 2 (two) times a day., Disp:  , Rfl:   •  cholecalciferol, vitamin D3, (VITAMIN D3 ORAL), Take 5,000 IU by mouth daily. Vitamin D Supreme  , Disp: , Rfl:   •  coenzyme Q10 (CO Q-10) 10 mg capsule, Take 200 mg by mouth daily.  , Disp: , Rfl:   •  flaxseed oiL oil, daily., Disp: , Rfl:   •  latanoprost (XALATAN) 0.005 % ophthalmic solution, Administer 1 drop into affected eye(s) nightly. Into left eye, Disp: , Rfl: 6  •  lisinopriL 40 mg tablet, Take 40 mg by mouth daily., Disp: , Rfl:   •  metoprolol succinate XL (TOPROL-XL) 100 mg 24 hr tablet, Take 100 mg by mouth daily. , Disp: , Rfl: 3  •  quercetin, Take 500 mg by mouth., Disp: , Rfl:   •  rosuvastatin (CRESTOR) 10 mg tablet, Take 1 tablet (10 mg total) by mouth once daily., Disp: 90 tablet, Rfl: 3      SUPPLEMENTS:  Coenzyme-Q10 100 mg daily, vitamin-D 5921-6662 international units daily, omega-3 fish oil 1000 mg daily, multiple vitamin.    ALLERGIES:  No known drug allergies.    FAMILY HISTORY:  Father  young at age 55 of lung cancer.  Mother has a pacemaker.    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  His wife's name is Jaye.  They have three children.  He has seven grandchildren.  He smoked 38 years ago for five years.  He rarely drinks alcohol.  He has an active lifestyle.  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    REVIEW OF SYSTEMS: Tobias is now in persistent atrial flutter with rate control.    PHYSICAL EXAMINATION:    There were no vitals filed for this visit.  Wt Readings from Last 3 Encounters:   22 103 kg (227 lb)   19 102 kg (224 lb)   06/10/19 101 kg (223 lb)     BP Readings from Last 3 Encounters:   22 128/80   19 132/76   06/10/19 130/82     HEENT:  Unremarkable.  Neck:  Supple, no JVD.  Lungs:  Clear to auscultation and percussion.  Cardiac:  Regular rate and rhythm.  Abdomen:  Soft, bowel sounds present, no organomegaly.  Extremities:  No edema,  pulses intact.  Skin:  Warm and dry.  Neuro:  Alert and oriented X 3.    LABORATORY DATA:      EKG:  Atrial Flutter rate controlled.    Coronary calcium score:  648 with two vessel involvement.      Stress echo was negative for myocardial ischemia or scar, normal left ventricular size, preserved systolic function.    Myocardial perfusion scan February 2019: Normal    Echocardiogram February 2019: Normal left ventricular size preserved function.  Mild mitral and tricuspid regurgitation.  Ascending aorta measured 4.2 cm.    Echocardiogram September 2021:  • The left ventricle is normal in size. Normal left ventricular wall thickness. There are no segmental wall motion abnormalities. Normal left ventricular ejection fraction. The left ventricular ejection fraction by visual estimate is 65% to 70%.  • Unable to assess LV diastolic function due to arrhythmia.  • The right ventricle is normal in size. There is normal function of the right ventricle.  • There is no mitral regurgitation. There is no mitral stenosis.  • There is no aortic stenosis. There is mild aortic regurgitation.  • There is trace tricuspid regurgitation.  • The aortic root is mildly enlarged. The aorta measures 4.2 cm at the sinus of Valsalva. The proximal segment of the ascending aorta is mildly enlarged. The proximal segment of the ascending aorta measures 4.0 cm.      Lipid panel:   Component      Latest Ref Rng & Units 11/9/2018 12/29/2021 7/21/2022   Cholesterol      <=200 mg/dL 146 149 152   HDL      >=45 mg/dL 68 62 62   LDL Calculated      <=100 mg/dL 65 71 81   RISK      <=5.0 2.1 2.4 2.5   Triglycerides      30 - 149 mg/dL 65 80 45   Non-HDL, Calculated      mg/dL 78 87 90     IMPRESSIONS/RECOMMENDATIONS:  1. Coronary artery disease.  The patient's stress test was negative for ischemia.  He is not on aspirin as he is on Eliquis.  2. Hypertension.  Continue Toprol-XL and lisinopril and amlodipine  3. Polygenic hypercholesterolemia.  The patient  will continue his Crestor  10 mg daily.  Our goal is an LDL below 70.  4. MTHFR polymorphism.  Continue B-complex with L5 methyl folate.  5. Insulin resistance.  Continue low carbohydrate low saturated fat Mediterranean diet and exercise program.  6. Obesity.   We discussed a low-carbohydrate, low-saturated fat Mediterranean Diet and an exercise walking program.  7.         Persistent atrial flutter.  Status post A. fib ablation complicated by phrenic nerve paralysis.  Patient is now resigned to rate control and anticoagulation for his atrial flutter.  He was followed by Dr. Juan C Lynn at [facility].  He no longer needs a Medtronic loop recorder.  8.         History of subdural hematoma.  This was secondary to head trauma and was not spontaneous.  He is on Eliquis now for his atrial flutter.  9.         History of phrenic nerve injury with diaphragmatic paralysis.  Resolved.  10.       History of total knee replacement.  Followed [facility].  He may need a left total knee replacement.  11.       History of cold agglutinins.  Followed [facility].    Summary:.  Tobias is demonstrating cardiovascular stability.  We will see him back in the office in 6 months.  We will continue to follow him for his cardiovascular prevention program.  We will coordinate with his electrophysiologist Dr. Juan C Lynn.     This was a 30 minute patient encounter with greater than 50% time spent in care coordination and counseling.     Sincerely,      Manuel Nichole MD   7/28/2022

## 2022-09-20 ENCOUNTER — TELEPHONE (OUTPATIENT)
Dept: SCHEDULING | Facility: CLINIC | Age: 77
End: 2022-09-20
Payer: COMMERCIAL

## 2023-02-01 NOTE — PROGRESS NOTES
rprise ID: [id number]  2023    Dakotah Villalba MD  [address]  [place] [address]    Troy Benoit MD    [address] [place] [address]      Re:  TOBIAS VALVERDE  :  1945      Dear Dakotah:    It was my pleasure to see Tobias Valverde in the cardiovascular office today.  We will follow Tobias for  subclinical coronary artery disease with an Agatston score of 648 with two vessel involvement, hypertension and polygenic hypercholesterolemia.    He  underwent right total knee replacement  at the [facility].  This was complicated by postoperative paroxysmal SVT.  He was discharged on flecainide 100 mg twice daily and Toprol- mg daily.  Ultimately he underwent a Medtronic loop recorder and subsequently ablation for atrial tachyarrhythmias.  He had complications with infection of the Medtronic loop recorder and is recently implanted right total knee.  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  He is now in rate controlled atrial flutter and on a combination of Toprol-XL and Eliquis.     He was followed by Dr. Juan C Lynn at [place].  Recent discussions indicate that he is satisfied with rate control of his atrial flutter and anticoagulation with the use of Eliquis.  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      At this time we discussed removal of his Medtronic loop recorder as it is not serving any real purpose.  At this time he will remain on Eliquis and not pursue a watchman device. He does have a remote history of subdural hematoma in .     We reviewed his blood pressure recordings over the past few months.  We agreed with the use of lisinopril 40 mg at bedtime and amlodipine at a relatively low dose of 2.5 mg in the morning.  His blood pressures have been reading 130/80.  He  is no longer hydrochlorothiazide as he developed gout with the thiazide diuretic.  His last echocardiogram in 2021 demonstrated normal left ventricular size and preserved systolic function.    Tobias underwent some recent blood work from his hematologist and was noted to have a creatinine of 1.4 and a GFR 52.  His previous creatinine was 1.1 in July 2022 with a GFR greater than 60.  I recommend that he repeat his creatinine and GFR.  Certainly his blood pressures been reasonably well controlled.  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    We reviewed his prevention program in detail.  His last lipid panel is noted below and is excellent.  Presently, he is on Crestor 10 mg daily.  He needs a repeat lipid panel.    There is evidence of insulin resistance.  We recommended a weight reduction diet.  His goal weight is 220 pounds.  We discussed a low-carbohydrate, low-saturated fat Mediterranean Diet.      PAST MEDICAL HISTORY:   1.  Subclinical coronary artery disease, coronary calcium score 648 with two vessel involvement including the LAD and RCA, March 2016.  Stress echo was performed in April 2016 which was negative for myocardial ischemia or scar, normal left ventricular size, preserved systolic function.   2.  dyslipidemia consistent with mild polygenic hypercholesterolemia, total cholesterol 198, , HDL 65, triglycerides 52, apoB 92, LDL-P 1496, normal LP(a)  3.  MTHFR polymorphism with hyperhomocystinemia  4.  endothelial dysfunction  5.  insulin resistance  6.  Hypertension  7.  abnormal EKG with incomplete right bundle branch block, frequent PVC's  8.  subdural hematoma in 2008 secondary to traumatic injury head injury secondary to a steel pipe.  Complicated by paroxysmal atrial fibrillation.  Now on Eliquis for persistent atrial flutter.  9.  Dilated aortic root at 4.4 cm.  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  Follow at the [facility].  11.  Postoperative PSVT.  12.  Status post ablation 2019 for  atrial fibrillation and PSVT.  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  Now in persistent atrial flutter with brief periods of more rapid atrial fibrillation.  13.  Status post ablation of ventricular arrhythmia LVOT.  2019     Past Medical History:   Diagnosis Date   • A-fib (CMS/HCC)    • A-fib (CMS/HCC) 2021   • Coronary artery disease    • COVID-19 vaccine administered     01/23/2021  & 02/24/2021   & 11/12/2021  Moderna   • Homozygous for [id number] polymorphism of MTHFR (CMS/HCC)    • Hyperlipidemia    • Hypertension    • Obesity      Past Surgical History:   Procedure Laterality Date   • ABLATION OF DYSRHYTHMIC FOCUS  05/2019   • APPENDECTOMY     • HERNIA REPAIR  07/2021    inguinal hernia's   (X2)   • KNEE SURGERY Right 07/2019    infection   • REPLACEMENT TOTAL KNEE Right 02/2019   • SUBDURAL HEMATOMA EVACUATION VIA CRANIOTOMY      Neurosurgery for subdural hematoma      CURRENT MEDICATIONS:     Current Outpatient Medications:   •  amLODIPine (NORVASC) 5 mg tablet, Take 5 mg by mouth daily., Disp: , Rfl:   •  amoxicillin (AMOXIL) 500 mg capsule, TAKE 4 CAPSULES BY MOUTH 1 HOUR PRIOR TO APPOINTMENT, Disp: , Rfl:   •  apixaban (ELIQUIS) 5 mg tablet, Take 5 mg by mouth 2 (two) times a day., Disp: , Rfl:   •  cholecalciferol, vitamin D3, (VITAMIN D3 ORAL), Take 5,000 IU by mouth daily., Disp: , Rfl:   •  coenzyme Q10 (COQ10) 200 mg capsule, Take 200 mg by mouth daily.  , Disp: , Rfl:   •  latanoprost (XALATAN) 0.005 % ophthalmic solution, Administer 1 drop into affected eye(s) nightly. Into left eye, Disp: , Rfl: 6  •  lisinopriL 40 mg tablet, Take 40 mg by mouth daily., Disp: , Rfl:   •  metoprolol succinate XL (TOPROL-XL) 100 mg 24 hr tablet, Take 100 mg by mouth daily. , Disp: , Rfl: 3  •  rosuvastatin (CRESTOR) 10 mg tablet, Take 1 tablet (10 mg total) by mouth once daily., Disp: 90 tablet, Rfl: 3  •  vitamin B complex (B COMPLEX ORAL), Take by mouth daily., Disp: , Rfl:    •  ZINC ORAL, Take by mouth daily., Disp: , Rfl:     SUPPLEMENTS:  Coenzyme-Q10 100 mg daily, vitamin-D 2385-5640 international units daily, omega-3 fish oil 1000 mg daily, multiple vitamin.    ALLERGIES:  No known drug allergies.    FAMILY HISTORY:  Father  young at age 55 of lung cancer.  Mother has a pacemaker.    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  His wife's name is Jaye.  They have three children.  He has seven grandchildren.  He smoked 38 years ago for five years.  He rarely drinks alcohol.  He has an active lifestyle.  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    REVIEW OF SYSTEMS: Tobias is now in persistent atrial flutter with rate control.    PHYSICAL EXAMINATION:    Vitals:    23 0903   BP: 132/80   Pulse: 83   SpO2: 98%     Wt Readings from Last 3 Encounters:   23 105 kg (230 lb 9.6 oz)   22 103 kg (226 lb)   22 103 kg (227 lb)     BP Readings from Last 3 Encounters:   23 132/80   22 126/88   22 128/80     HEENT:  Unremarkable.  Neck:  Supple, no JVD.  Lungs:  Clear to auscultation and percussion.  Cardiac:  Regular rate and rhythm.  Abdomen:  Soft, bowel sounds present, no organomegaly.  Extremities:  No edema, pulses intact.  Skin:  Warm and dry.  Neuro:  Alert and oriented X 3.    LABORATORY DATA:      EKG:  Atrial Flutter rate controlled.    Coronary calcium score:  648 with two vessel involvement.      Stress echo was negative for myocardial ischemia or scar, normal left ventricular size, preserved systolic function.    Myocardial perfusion scan 2019: Normal    Echocardiogram 2019: Normal left ventricular size preserved function.  Mild mitral and tricuspid regurgitation.  Ascending aorta measured 4.2 cm.    Echocardiogram 2021:  • The left ventricle is normal in size. Normal left ventricular wall thickness. There are no  segmental wall motion abnormalities. Normal left ventricular ejection fraction. The left ventricular ejection fraction by visual estimate is 65% to 70%.  • Unable to assess LV diastolic function due to arrhythmia.  • The right ventricle is normal in size. There is normal function of the right ventricle.  • There is no mitral regurgitation. There is no mitral stenosis.  • There is no aortic stenosis. There is mild aortic regurgitation.  • There is trace tricuspid regurgitation.  • The aortic root is mildly enlarged. The aorta measures 4.2 cm at the sinus of Valsalva. The proximal segment of the ascending aorta is mildly enlarged. The proximal segment of the ascending aorta measures 4.0 cm.    Lipid panel:   Component      Latest Ref Rng & Units 11/9/2018 12/29/2021 7/21/2022   Cholesterol      <=200 mg/dL 146 149 152   HDL      >=45 mg/dL 68 62 62   LDL Calculated      <=100 mg/dL 65 71 81   RISK      <=5.0 2.1 2.4 2.5   Triglycerides      30 - 149 mg/dL 65 80 45   Non-HDL, Calculated      mg/dL 78 87 90       IMPRESSIONS/RECOMMENDATIONS:  1. Coronary artery disease.  The patient's stress test was negative for ischemia.  He is not on aspirin as he is on Eliquis.  2. Hypertension.  Continue Toprol-XL and lisinopril and amlodipine  3. Polygenic hypercholesterolemia.  The patient will continue his Crestor  10 mg daily.  Our goal is an LDL below 70.  Needs repeat lipid panel.  4. MTHFR polymorphism.  Continue B-complex with L5 methyl folate.  5. Insulin resistance.  Continue low carbohydrate low saturated fat Mediterranean diet and exercise program.  6. Obesity.   We discussed a low-carbohydrate, low-saturated fat Mediterranean Diet and an exercise walking program.  7.         Persistent atrial flutter.  Status post A. fib ablation complicated by phrenic nerve paralysis.  Patient is now resigned to rate control and anticoagulation for his atrial flutter.  He was followed by Dr. Juan C Lynn at [facility]  [place].  He no longer needs a Medtronic loop recorder.  8.         History of subdural hematoma.  This was secondary to head trauma and was not spontaneous.  He is on Eliquis now for his atrial flutter.  9.         History of phrenic nerve injury with diaphragmatic paralysis.  Resolved.  10.       History of total knee replacement.  Followed [facility].  He may need a left total knee replacement.  11.       History of cold agglutinins.  Followed [facility].  12.       Chronic kidney disease stage IIIa.  Patient's recent creatinine is 1.4 with a GFR of 52.  13.       Dilated aortic root.  The patient's recent echocardiogram demonstrated mild dilatation of the sinus of Valsalva at 4.2 cm and ascending thoracic aorta at 4.0 cm.  A CT angiogram of the chest 2019 demonstrated normal aortic root at 3.9 cm.    Summary:.      Tobias is demonstrating cardiovascular stability.      Blood work was recently ordered by his hematologist following his cold agglutinins that included renal function.  His creatinine has risen to 1.4 with a GFR of 52.  This needs to be repeated.  If necessary he needs to follow-up with nephrology.      We reviewed his cardiac history.  He now has chronic atrial flutter rate controlled with Toprol-XL and anticoagulated with Eliquis.  Electrophysiology has signed off.    His blood pressures been reasonably well controlled with the use of amlodipine and lisinopril.    His last lipid panel was in July with an LDL of 81.  He will undergo repeat lipid panel.    His weight is 230 pounds with BMI 32.  He was counseled on low carbohydrate low saturated fat Mediterranean diet.    This was a 30 minute patient encounter with greater than 50% time spent in care coordination and counseling.     Sincerely,    Manuel Nichole MD  02/02/23

## 2023-02-02 ENCOUNTER — OFFICE VISIT (OUTPATIENT)
Dept: CARDIOLOGY | Facility: CLINIC | Age: 78
End: 2023-02-02
Payer: COMMERCIAL

## 2023-02-02 VITALS
HEIGHT: 71 IN | OXYGEN SATURATION: 98 % | SYSTOLIC BLOOD PRESSURE: 132 MMHG | BODY MASS INDEX: 32.28 KG/M2 | HEART RATE: 83 BPM | WEIGHT: 230.6 LBS | DIASTOLIC BLOOD PRESSURE: 80 MMHG

## 2023-02-02 DIAGNOSIS — I25.10 CORONARY ARTERY DISEASE INVOLVING NATIVE CORONARY ARTERY OF NATIVE HEART WITHOUT ANGINA PECTORIS: Primary | ICD-10-CM

## 2023-02-02 DIAGNOSIS — I47.10 SVT (SUPRAVENTRICULAR TACHYCARDIA) (CMS/HCC): ICD-10-CM

## 2023-02-02 DIAGNOSIS — E78.00 HYPERCHOLESTEROLEMIA: ICD-10-CM

## 2023-02-02 DIAGNOSIS — I47.19 ATRIAL TACHYCARDIA (CMS/HCC): ICD-10-CM

## 2023-02-02 DIAGNOSIS — I10 ESSENTIAL HYPERTENSION: ICD-10-CM

## 2023-02-02 DIAGNOSIS — Z86.79 HISTORY OF ATRIAL FIBRILLATION: ICD-10-CM

## 2023-02-02 PROCEDURE — 3008F BODY MASS INDEX DOCD: CPT | Performed by: INTERNAL MEDICINE

## 2023-02-02 PROCEDURE — 99214 OFFICE O/P EST MOD 30 MIN: CPT | Performed by: INTERNAL MEDICINE

## 2023-02-02 PROCEDURE — 93000 ELECTROCARDIOGRAM COMPLETE: CPT | Performed by: INTERNAL MEDICINE

## 2023-02-02 NOTE — LETTER
2023     Dakotah Villalba MD  [address] [address]    Patient: Tobias Valverde  YOB: 1945  Date of Visit: 2023      Dear Dr. Villalba:    Thank you for referring Tobias Valverde to me for evaluation. Below are my notes for this consultation.    If you have questions, please do not hesitate to call me. I look forward to following your patient along with you.         Sincerely,        Manuel Nichole MD        CC: MD Dayanara Casiano Thomas P, MD  2023 10:07 AM  Sign when Signing Visit  rprise ID: [id number]  2023    Dakotah Villalba MD  [address] [place] [id number]    Troy Benoit MD    [address] [place] [address]      Re:  TOBIAS VALVERDE  :  1945      Dear Dakotah:    It was my pleasure to see Tobias Valverde in the cardiovascular office today.  We will follow Tobias for  subclinical coronary artery disease with an Agatston score of 648 with two vessel involvement, hypertension and polygenic hypercholesterolemia.    He  underwent right total knee replacement  at the [facility].  This was complicated by postoperative paroxysmal SVT.  He was discharged on flecainide 100 mg twice daily and Toprol- mg daily.  Ultimately he underwent a [employer] loop recorder and subsequently ablation for atrial tachyarrhythmias.  He had complications with infection of the Medtronic loop recorder and is recently implanted right total knee.  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  He is now in rate controlled atrial flutter and on a combination of Toprol-XL and Eliquis.     He was followed by Dr. Juan C Lynn at [place].  Recent discussions indicate that he is satisfied with rate control of his atrial flutter and anticoagulation with the use of Eliquis.  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      At this time we discussed removal of his Medtronic loop recorder as it is not serving any real purpose.  At this time he will remain on Eliquis and not pursue a watchman device. He does have a remote history of subdural hematoma in 2008.     We reviewed his blood pressure recordings over the past few months.  We agreed with the use of lisinopril 40 mg at bedtime and amlodipine at a relatively low dose of 2.5 mg in the morning.  His blood pressures have been reading 130/80.  He is no longer hydrochlorothiazide as he developed gout with the thiazide diuretic.  His last echocardiogram in 2021 demonstrated normal left ventricular size and preserved systolic function.    Tobias underwent some recent blood work from his hematologist and was noted to have a creatinine of 1.4 and a GFR 52.  His previous creatinine was 1.1 in July 2022 with a GFR greater than 60.  I recommend that he repeat his creatinine and GFR.  Certainly his blood pressures been reasonably well controlled.  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    We reviewed his prevention program in detail.  His last lipid panel is noted below and is excellent.  Presently, he is on Crestor 10 mg daily.  He needs a repeat lipid panel.    There is evidence of insulin resistance.  We recommended a weight reduction diet.  His goal weight is 220 pounds.  We discussed a low-carbohydrate, low-saturated fat Mediterranean Diet.      PAST MEDICAL HISTORY:   1.  Subclinical coronary artery disease, coronary calcium score 648 with two vessel involvement including the LAD and RCA, March 2016.  Stress echo was performed in April 2016 which was negative for myocardial ischemia or scar, normal left ventricular size, preserved systolic function.   2.  dyslipidemia consistent with mild polygenic hypercholesterolemia, total cholesterol 198, , HDL 65, triglycerides 52, apoB 92,  LDL-P 1496, normal LP(a)  3.  MTHFR polymorphism with hyperhomocystinemia  4.  endothelial dysfunction  5.  insulin resistance  6.  Hypertension  7.  abnormal EKG with incomplete right bundle branch block, frequent PVC's  8.  subdural hematoma in 2008 secondary to traumatic injury head injury secondary to a steel pipe.  Complicated by paroxysmal atrial fibrillation.  Now on Eliquis for persistent atrial flutter.  9.  Dilated aortic root at 4.4 cm.  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  Follow at the [facility].  11.  Postoperative PSVT.  12.  Status post ablation 2019 for atrial fibrillation and PSVT.  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  Now in persistent atrial flutter with brief periods of more rapid atrial fibrillation.  13.  Status post ablation of ventricular arrhythmia LVOT.  2019     Past Medical History:   Diagnosis Date   • A-fib (CMS/HCC)    • A-fib (CMS/HCC) 2021   • Coronary artery disease    • COVID-19 vaccine administered     01/23/2021  & 02/24/2021   & 11/12/2021  Moderna   • Homozygous for C677T polymorphism of MTHFR (CMS/HCC)    • Hyperlipidemia    • Hypertension    • Obesity      Past Surgical History:   Procedure Laterality Date   • ABLATION OF DYSRHYTHMIC FOCUS  05/2019   • APPENDECTOMY     • HERNIA REPAIR  07/2021    inguinal hernia's   (X2)   • KNEE SURGERY Right 07/2019    infection   • REPLACEMENT TOTAL KNEE Right 02/2019   • SUBDURAL HEMATOMA EVACUATION VIA CRANIOTOMY      Neurosurgery for subdural hematoma      CURRENT MEDICATIONS:     Current Outpatient Medications:   •  amLODIPine (NORVASC) 5 mg tablet, Take 5 mg by mouth daily., Disp: , Rfl:   •  amoxicillin (AMOXIL) 500 mg capsule, TAKE 4 CAPSULES BY MOUTH 1 HOUR PRIOR TO APPOINTMENT, Disp: , Rfl:   •  apixaban (ELIQUIS) 5 mg tablet, Take 5 mg by mouth 2 (two) times a day., Disp: , Rfl:   •  cholecalciferol, vitamin D3, (VITAMIN D3 ORAL), Take 5,000 IU by mouth daily., Disp: , Rfl:   •   coenzyme Q10 (COQ10) 200 mg capsule, Take 200 mg by mouth daily.  , Disp: , Rfl:   •  latanoprost (XALATAN) 0.005 % ophthalmic solution, Administer 1 drop into affected eye(s) nightly. Into left eye, Disp: , Rfl: 6  •  lisinopriL 40 mg tablet, Take 40 mg by mouth daily., Disp: , Rfl:   •  metoprolol succinate XL (TOPROL-XL) 100 mg 24 hr tablet, Take 100 mg by mouth daily. , Disp: , Rfl: 3  •  rosuvastatin (CRESTOR) 10 mg tablet, Take 1 tablet (10 mg total) by mouth once daily., Disp: 90 tablet, Rfl: 3  •  vitamin B complex (B COMPLEX ORAL), Take by mouth daily., Disp: , Rfl:   •  ZINC ORAL, Take by mouth daily., Disp: , Rfl:     SUPPLEMENTS:  Coenzyme-Q10 100 mg daily, vitamin-D 0534-2449 international units daily, omega-3 fish oil 1000 mg daily, multiple vitamin.    ALLERGIES:  No known drug allergies.    FAMILY HISTORY:  Father  young at age 55 of lung cancer.  Mother has a pacemaker.    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  His wife's name is Jaye.  They have three children.  He has seven grandchildren.  He smoked 38 years ago for five years.  He rarely drinks alcohol.  He has an active lifestyle.  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    REVIEW OF SYSTEMS: Tobias is now in persistent atrial flutter with rate control.    PHYSICAL EXAMINATION:    Vitals:    23 0903   BP: 132/80   Pulse: 83   SpO2: 98%     Wt Readings from Last 3 Encounters:   23 105 kg (230 lb 9.6 oz)   22 103 kg (226 lb)   22 103 kg (227 lb)     BP Readings from Last 3 Encounters:   23 132/80   22 126/88   22 128/80     HEENT:  Unremarkable.  Neck:  Supple, no JVD.  Lungs:  Clear to auscultation and percussion.  Cardiac:  Regular rate and rhythm.  Abdomen:  Soft, bowel sounds present, no organomegaly.  Extremities:  No edema, pulses intact.  Skin:  Warm and dry.  Neuro:  Alert and oriented X  3.    LABORATORY DATA:      EKG:  Atrial Flutter rate controlled.    Coronary calcium score:  648 with two vessel involvement.      Stress echo was negative for myocardial ischemia or scar, normal left ventricular size, preserved systolic function.    Myocardial perfusion scan February 2019: Normal    Echocardiogram February 2019: Normal left ventricular size preserved function.  Mild mitral and tricuspid regurgitation.  Ascending aorta measured 4.2 cm.    Echocardiogram September 2021:  • The left ventricle is normal in size. Normal left ventricular wall thickness. There are no segmental wall motion abnormalities. Normal left ventricular ejection fraction. The left ventricular ejection fraction by visual estimate is 65% to 70%.  • Unable to assess LV diastolic function due to arrhythmia.  • The right ventricle is normal in size. There is normal function of the right ventricle.  • There is no mitral regurgitation. There is no mitral stenosis.  • There is no aortic stenosis. There is mild aortic regurgitation.  • There is trace tricuspid regurgitation.  • The aortic root is mildly enlarged. The aorta measures 4.2 cm at the sinus of Valsalva. The proximal segment of the ascending aorta is mildly enlarged. The proximal segment of the ascending aorta measures 4.0 cm.    Lipid panel:   Component      Latest Ref Rng & Units 11/9/2018 12/29/2021 7/21/2022   Cholesterol      <=200 mg/dL 146 149 152   HDL      >=45 mg/dL 68 62 62   LDL Calculated      <=100 mg/dL 65 71 81   RISK      <=5.0 2.1 2.4 2.5   Triglycerides      30 - 149 mg/dL 65 80 45   Non-HDL, Calculated      mg/dL 78 87 90       IMPRESSIONS/RECOMMENDATIONS:  1. Coronary artery disease.  The patient's stress test was negative for ischemia.  He is not on aspirin as he is on Eliquis.  2. Hypertension.  Continue Toprol-XL and lisinopril and amlodipine  3. Polygenic hypercholesterolemia.  The patient will continue his Crestor  10 mg daily.  Our goal is an LDL below  70.  Needs repeat lipid panel.  4. MTHFR polymorphism.  Continue B-complex with L5 methyl folate.  5. Insulin resistance.  Continue low carbohydrate low saturated fat Mediterranean diet and exercise program.  6. Obesity.   We discussed a low-carbohydrate, low-saturated fat Mediterranean Diet and an exercise walking program.  7.         Persistent atrial flutter.  Status post A. fib ablation complicated by phrenic nerve paralysis.  Patient is now resigned to rate control and anticoagulation for his atrial flutter.  He was followed by Dr. Juan C Lynn at [facility].  He no longer needs a [employer] loop recorder.  8.         History of subdural hematoma.  This was secondary to head trauma and was not spontaneous.  He is on Eliquis now for his atrial flutter.  9.         History of phrenic nerve injury with diaphragmatic paralysis.  Resolved.  10.       History of total knee replacement.  Followed [facility].  He may need a left total knee replacement.  11.       History of cold agglutinins.  Followed [facility].  12.       Chronic kidney disease stage IIIa.  Patient's recent creatinine is 1.4 with a GFR of 52.  13.       Dilated aortic root.  The patient's recent echocardiogram demonstrated mild dilatation of the sinus of Valsalva at 4.2 cm and ascending thoracic aorta at 4.0 cm.  A CT angiogram of the chest 2019 demonstrated normal aortic root at 3.9 cm.    Summary:.      Tobias is demonstrating cardiovascular stability.      Blood work was recently ordered by his hematologist following his cold agglutinins that included renal function.  His creatinine has risen to 1.4 with a GFR of 52.  This needs to be repeated.  If necessary he needs to follow-up with nephrology.      We reviewed his cardiac history.  He now has chronic atrial flutter rate controlled with Toprol-XL and anticoagulated with Eliquis.  Electrophysiology has signed off.    His blood pressures been  reasonably well controlled with the use of amlodipine and lisinopril.    His last lipid panel was in July with an LDL of 81.  He will undergo repeat lipid panel.    His weight is 230 pounds with BMI 32.  He was counseled on low carbohydrate low saturated fat Mediterranean diet.    This was a 30 minute patient encounter with greater than 50% time spent in care coordination and counseling.     Sincerely,    Manuel Nichole MD  02/02/23

## 2023-06-01 ENCOUNTER — TELEPHONE (OUTPATIENT)
Dept: SCHEDULING | Facility: CLINIC | Age: 78
End: 2023-06-01
Payer: COMMERCIAL

## 2023-06-01 DIAGNOSIS — Z86.79 HISTORY OF ATRIAL FIBRILLATION: ICD-10-CM

## 2023-06-01 DIAGNOSIS — I47.19 ATRIAL TACHYCARDIA (CMS/HCC): ICD-10-CM

## 2023-06-01 DIAGNOSIS — I47.10 SVT (SUPRAVENTRICULAR TACHYCARDIA) (CMS/HCC): Primary | ICD-10-CM

## 2023-06-01 NOTE — TELEPHONE ENCOUNTER
Pt wants to have his loop recorfer removed prior to his knee replacement in September.     Jaye, wife  wanted to know who do you suggest he see for this?    Please call wife Jaye at [phone] to advise

## 2023-06-02 NOTE — TELEPHONE ENCOUNTER
Spoke with Jaye. They do not want to go back to the same provider who put in the loop recorder due to a bad experience. Put in referral  for our EP team at [facility].

## 2023-07-29 ENCOUNTER — APPOINTMENT (EMERGENCY)
Dept: RADIOLOGY | Facility: HOSPITAL | Age: 78
DRG: 388 | End: 2023-07-29
Attending: EMERGENCY MEDICINE
Payer: COMMERCIAL

## 2023-07-29 ENCOUNTER — APPOINTMENT (EMERGENCY)
Dept: RADIOLOGY | Facility: HOSPITAL | Age: 78
DRG: 388 | End: 2023-07-29
Payer: COMMERCIAL

## 2023-07-29 ENCOUNTER — HOSPITAL ENCOUNTER (INPATIENT)
Facility: HOSPITAL | Age: 78
LOS: 2 days | Discharge: HOME | DRG: 388 | End: 2023-07-31
Attending: EMERGENCY MEDICINE | Admitting: HOSPITALIST
Payer: COMMERCIAL

## 2023-07-29 DIAGNOSIS — R31.29 MICROSCOPIC HEMATURIA: Primary | ICD-10-CM

## 2023-07-29 DIAGNOSIS — J84.10 INTERSTITIAL PULMONARY FIBROSIS (CMS/HCC): ICD-10-CM

## 2023-07-29 DIAGNOSIS — N28.89 KIDNEY MASS: ICD-10-CM

## 2023-07-29 DIAGNOSIS — R74.8 ELEVATED LIVER ENZYMES: ICD-10-CM

## 2023-07-29 DIAGNOSIS — K76.9 LIVER LESION: ICD-10-CM

## 2023-07-29 DIAGNOSIS — R91.1 PULMONARY NODULE: ICD-10-CM

## 2023-07-29 DIAGNOSIS — R53.1 GENERALIZED WEAKNESS: ICD-10-CM

## 2023-07-29 PROBLEM — K56.600 PARTIAL SMALL BOWEL OBSTRUCTION (CMS/HCC): Status: ACTIVE | Noted: 2023-07-29

## 2023-07-29 LAB
ALBUMIN SERPL-MCNC: 4.1 G/DL (ref 3.5–5.7)
ALP SERPL-CCNC: 74 IU/L (ref 34–125)
ALT SERPL-CCNC: 61 IU/L (ref 7–52)
ANION GAP SERPL CALC-SCNC: 8 MEQ/L (ref 3–15)
ANISOCYTOSIS BLD QL SMEAR: ABNORMAL
AST SERPL-CCNC: 50 IU/L (ref 13–39)
BACTERIA URNS QL MICRO: ABNORMAL /HPF
BASOPHILS # BLD: 0 K/UL (ref 0.01–0.1)
BASOPHILS NFR BLD: 0 %
BILIRUB SERPL-MCNC: 1.8 MG/DL (ref 0.3–1.2)
BILIRUB UR QL STRIP.AUTO: NEGATIVE MG/DL
BUN SERPL-MCNC: 25 MG/DL (ref 7–25)
CALCIUM SERPL-MCNC: 8.7 MG/DL (ref 8.6–10.3)
CHLORIDE SERPL-SCNC: 104 MEQ/L (ref 98–107)
CLARITY UR REFRACT.AUTO: CLEAR
CO2 SERPL-SCNC: 24 MEQ/L (ref 21–31)
COLOR UR AUTO: YELLOW
CREAT SERPL-MCNC: 1.2 MG/DL (ref 0.7–1.3)
DIFFERENTIAL METHOD BLD: ABNORMAL
EOSINOPHIL # BLD: 0.27 K/UL (ref 0.04–0.54)
EOSINOPHIL NFR BLD: 5 %
ERYTHROCYTE [DISTWIDTH] IN BLOOD BY AUTOMATED COUNT: ABNORMAL %
GFR SERPL CREATININE-BSD FRML MDRD: 59 ML/MIN/1.73M*2
GIANT PLATELETS BLD QL SMEAR: ABNORMAL
GLUCOSE SERPL-MCNC: 176 MG/DL (ref 70–99)
GLUCOSE UR STRIP.AUTO-MCNC: NEGATIVE MG/DL
HCT VFR BLDCO AUTO: ABNORMAL %
HGB BLD-MCNC: 13.1 G/DL (ref 13.7–17.5)
HGB UR QL STRIP.AUTO: 3
HYALINE CASTS #/AREA URNS LPF: ABNORMAL /LPF
KETONES UR STRIP.AUTO-MCNC: NEGATIVE MG/DL
LACTATE SERPL-SCNC: 1.6 MMOL/L (ref 0.4–2)
LEUKOCYTE ESTERASE UR QL STRIP.AUTO: NEGATIVE
LYMPHOCYTES # BLD: 1.26 K/UL (ref 1.2–3.5)
LYMPHOCYTES NFR BLD: 23 %
MACROCYTES BLD QL SMEAR: ABNORMAL
MCH RBC QN AUTO: ABNORMAL PG
MCHC RBC AUTO-ENTMCNC: ABNORMAL G/DL
MCV RBC AUTO: ABNORMAL FL
MONOCYTES # BLD: 0.55 K/UL (ref 0.3–1)
MONOCYTES NFR BLD: 10 %
MUCOUS THREADS URNS QL MICRO: ABNORMAL /LPF
NEUTS BAND # BLD: 0.05 K/UL (ref 0–0.53)
NEUTS BAND # BLD: 3.34 K/UL (ref 1.7–7)
NEUTS BAND NFR BLD: 1 %
NEUTS SEG NFR BLD: 61 %
NITRITE UR QL STRIP.AUTO: NEGATIVE
PDW BLD AUTO: 11.4 FL (ref 9.4–12.4)
PH UR STRIP.AUTO: 6 [PH]
PLATELET # BLD AUTO: 132 K/UL (ref 150–350)
PLATELET # BLD EST: ABNORMAL 10*3/UL
POTASSIUM SERPL-SCNC: 3.9 MEQ/L (ref 3.5–5.1)
PROT SERPL-MCNC: 6.9 G/DL (ref 6–8.2)
PROT UR QL STRIP.AUTO: 1
RBC # BLD AUTO: ABNORMAL 10*6/UL
RBC #/AREA URNS HPF: ABNORMAL /HPF
ROULEAUX BLD QL SMEAR: ABNORMAL
SODIUM SERPL-SCNC: 136 MEQ/L (ref 136–145)
SP GR UR REFRACT.AUTO: 1.02
SQUAMOUS URNS QL MICRO: ABNORMAL /HPF
TROPONIN I SERPL HS-MCNC: 7.2 PG/ML
TROPONIN I SERPL HS-MCNC: 8.6 PG/ML
UROBILINOGEN UR STRIP-ACNC: 0.2 EU/DL
WBC # BLD AUTO: 5.47 K/UL (ref 3.8–10.5)
WBC #/AREA URNS HPF: ABNORMAL /HPF

## 2023-07-29 PROCEDURE — 99221 1ST HOSP IP/OBS SF/LOW 40: CPT | Performed by: SURGERY

## 2023-07-29 PROCEDURE — 36415 COLL VENOUS BLD VENIPUNCTURE: CPT | Performed by: EMERGENCY MEDICINE

## 2023-07-29 PROCEDURE — 96360 HYDRATION IV INFUSION INIT: CPT | Mod: 59

## 2023-07-29 PROCEDURE — 93970 EXTREMITY STUDY: CPT

## 2023-07-29 PROCEDURE — 93005 ELECTROCARDIOGRAM TRACING: CPT | Performed by: EMERGENCY MEDICINE

## 2023-07-29 PROCEDURE — 84484 ASSAY OF TROPONIN QUANT: CPT | Performed by: EMERGENCY MEDICINE

## 2023-07-29 PROCEDURE — 99285 EMERGENCY DEPT VISIT HI MDM: CPT | Mod: 25

## 2023-07-29 PROCEDURE — 99223 1ST HOSP IP/OBS HIGH 75: CPT | Performed by: HOSPITALIST

## 2023-07-29 PROCEDURE — 12000000 HC ROOM AND CARE MED/SURG

## 2023-07-29 PROCEDURE — 87040 BLOOD CULTURE FOR BACTERIA: CPT

## 2023-07-29 PROCEDURE — 80053 COMPREHEN METABOLIC PANEL: CPT | Performed by: EMERGENCY MEDICINE

## 2023-07-29 PROCEDURE — G1004 CDSM NDSC: HCPCS

## 2023-07-29 PROCEDURE — 74177 CT ABD & PELVIS W/CONTRAST: CPT | Mod: MG

## 2023-07-29 PROCEDURE — 84484 ASSAY OF TROPONIN QUANT: CPT | Mod: 91 | Performed by: EMERGENCY MEDICINE

## 2023-07-29 PROCEDURE — 25800000 HC PHARMACY IV SOLUTIONS: Performed by: HOSPITALIST

## 2023-07-29 PROCEDURE — 96361 HYDRATE IV INFUSION ADD-ON: CPT | Mod: 59

## 2023-07-29 PROCEDURE — 25800000 HC PHARMACY IV SOLUTIONS: Performed by: EMERGENCY MEDICINE

## 2023-07-29 PROCEDURE — 63600105 HC IODINE BASED CONTRAST: Mod: JZ | Performed by: EMERGENCY MEDICINE

## 2023-07-29 PROCEDURE — 81001 URINALYSIS AUTO W/SCOPE: CPT

## 2023-07-29 PROCEDURE — 63700000 HC SELF-ADMINISTRABLE DRUG: Performed by: HOSPITALIST

## 2023-07-29 PROCEDURE — 85025 COMPLETE CBC W/AUTO DIFF WBC: CPT | Performed by: EMERGENCY MEDICINE

## 2023-07-29 PROCEDURE — 71046 X-RAY EXAM CHEST 2 VIEWS: CPT

## 2023-07-29 PROCEDURE — 83605 ASSAY OF LACTIC ACID: CPT | Performed by: EMERGENCY MEDICINE

## 2023-07-29 PROCEDURE — 25800000 HC PHARMACY IV SOLUTIONS

## 2023-07-29 RX ORDER — ROSUVASTATIN CALCIUM 10 MG/1
10 TABLET, COATED ORAL NIGHTLY
Status: DISCONTINUED | OUTPATIENT
Start: 2023-07-30 | End: 2023-07-31 | Stop reason: HOSPADM

## 2023-07-29 RX ORDER — METOPROLOL SUCCINATE 100 MG/1
100 TABLET, EXTENDED RELEASE ORAL DAILY
Status: DISCONTINUED | OUTPATIENT
Start: 2023-07-30 | End: 2023-07-31 | Stop reason: HOSPADM

## 2023-07-29 RX ORDER — CHOLECALCIFEROL (VITAMIN D3) 25 MCG
5000 TABLET ORAL DAILY
Status: DISCONTINUED | OUTPATIENT
Start: 2023-07-30 | End: 2023-07-31 | Stop reason: HOSPADM

## 2023-07-29 RX ORDER — LATANOPROST 50 UG/ML
1 SOLUTION/ DROPS OPHTHALMIC NIGHTLY
Status: DISCONTINUED | OUTPATIENT
Start: 2023-07-30 | End: 2023-07-29 | Stop reason: CLARIF

## 2023-07-29 RX ORDER — LATANOPROST 50 UG/ML
1 SOLUTION/ DROPS OPHTHALMIC NIGHTLY
Status: DISCONTINUED | OUTPATIENT
Start: 2023-07-30 | End: 2023-07-31 | Stop reason: HOSPADM

## 2023-07-29 RX ORDER — AMOXICILLIN AND CLAVULANATE POTASSIUM 875; 125 MG/1; MG/1
1 TABLET, FILM COATED ORAL 2 TIMES DAILY WITH MEALS
Status: DISCONTINUED | OUTPATIENT
Start: 2023-07-30 | End: 2023-07-31 | Stop reason: HOSPADM

## 2023-07-29 RX ORDER — SODIUM CHLORIDE 9 MG/ML
INJECTION, SOLUTION INTRAVENOUS ONCE
Status: COMPLETED | OUTPATIENT
Start: 2023-07-29 | End: 2023-07-29

## 2023-07-29 RX ORDER — DEXTROSE 40 %
15-30 GEL (GRAM) ORAL AS NEEDED
Status: DISCONTINUED | OUTPATIENT
Start: 2023-07-29 | End: 2023-07-31 | Stop reason: HOSPADM

## 2023-07-29 RX ORDER — AMOXICILLIN AND CLAVULANATE POTASSIUM 875; 125 MG/1; MG/1
1 TABLET, FILM COATED ORAL ONCE
Status: DISCONTINUED | OUTPATIENT
Start: 2023-07-29 | End: 2023-07-29

## 2023-07-29 RX ORDER — AMLODIPINE BESYLATE 5 MG/1
5 TABLET ORAL DAILY
Status: DISCONTINUED | OUTPATIENT
Start: 2023-07-30 | End: 2023-07-31 | Stop reason: HOSPADM

## 2023-07-29 RX ORDER — AZITHROMYCIN 250 MG/1
500 TABLET, FILM COATED ORAL ONCE
Status: COMPLETED | OUTPATIENT
Start: 2023-07-30 | End: 2023-07-30

## 2023-07-29 RX ORDER — IBUPROFEN 200 MG
16-32 TABLET ORAL AS NEEDED
Status: DISCONTINUED | OUTPATIENT
Start: 2023-07-29 | End: 2023-07-31 | Stop reason: HOSPADM

## 2023-07-29 RX ORDER — LISINOPRIL 40 MG/1
40 TABLET ORAL DAILY
Status: DISCONTINUED | OUTPATIENT
Start: 2023-07-30 | End: 2023-07-31 | Stop reason: HOSPADM

## 2023-07-29 RX ORDER — DEXTROSE 50 % IN WATER (D50W) INTRAVENOUS SYRINGE
25 AS NEEDED
Status: DISCONTINUED | OUTPATIENT
Start: 2023-07-29 | End: 2023-07-31 | Stop reason: HOSPADM

## 2023-07-29 RX ORDER — SODIUM CHLORIDE 9 MG/ML
INJECTION, SOLUTION INTRAVENOUS CONTINUOUS
Status: ACTIVE | OUTPATIENT
Start: 2023-07-29 | End: 2023-07-30

## 2023-07-29 RX ADMIN — SODIUM CHLORIDE: 0.9 INJECTION, SOLUTION INTRAVENOUS at 15:56

## 2023-07-29 RX ADMIN — APIXABAN 5 MG: 5 TABLET, FILM COATED ORAL at 23:25

## 2023-07-29 RX ADMIN — LATANOPROST 1 DROP: 50 SOLUTION/ DROPS OPHTHALMIC at 23:40

## 2023-07-29 RX ADMIN — SODIUM CHLORIDE 500 ML: 9 INJECTION, SOLUTION INTRAVENOUS at 14:55

## 2023-07-29 RX ADMIN — SODIUM CHLORIDE 1000 ML: 9 INJECTION, SOLUTION INTRAVENOUS at 09:41

## 2023-07-29 RX ADMIN — SODIUM CHLORIDE: 9 INJECTION, SOLUTION INTRAVENOUS at 23:03

## 2023-07-29 RX ADMIN — AMOXICILLIN AND CLAVULANATE POTASSIUM 1 TABLET: 875; 125 TABLET, FILM COATED ORAL at 23:25

## 2023-07-29 RX ADMIN — ROSUVASTATIN CALCIUM 10 MG: 10 TABLET, FILM COATED ORAL at 23:25

## 2023-07-29 RX ADMIN — IOHEXOL 100 ML: 350 INJECTION, SOLUTION INTRAVENOUS at 17:09

## 2023-07-29 ASSESSMENT — ENCOUNTER SYMPTOMS
RHINORRHEA: 0
VOMITING: 0
FEVER: 1
ABDOMINAL PAIN: 0
DIARRHEA: 0
SHORTNESS OF BREATH: 0
WEAKNESS: 1
NAUSEA: 0
SORE THROAT: 0
LIGHT-HEADEDNESS: 0
COUGH: 1

## 2023-07-29 NOTE — ED ATTESTATION NOTE
I have personally seen and examined Tobias Little. I personally performed the key components of the encounter and provided a substantive portion of the care and medical decision making for this patient.    I reviewed and agree with physician assistant’s assessment and plan of care, with any exceptions as documented below.     My focused history, examination, assessment, and plan of care of Tobias Little is as follows:    Brief History:   77-year-old male on Eliquis secondary to the chronic atrial flutter, history of hypertension hyperlipidemia with recent diagnosis of pneumonia in outside emergency department 2 days ago and started on amoxicillin with azithromycin presented with diffuse generalized malaise along with hypotension less than 80 systolic blood pressure and palpitations at 108.  Denies any melena bloody stools.  Denies any urinary symptoms of frequency urgency dysuria no hematuria.  Patient stated that approximately 2 days ago he had fever at 103.3 along with dry cough for which she was seen in outside emergency department.  Work-up showed that patient might have pneumonia and started antibiotics.  Denies any lower extremity pain or new swelling or pain.  Focused Physical Exam:   Vitals:    07/29/23 0904   BP: (!) 99/57   Pulse: 96   Resp: 20   Temp: (!) 35.7 °C (96.2 °F)   SpO2: 98%   Awake and alert good eye contact pleasant cooperative.  Minimal appreciable anicteric sclera, regular rate and rhythm with occasional ectopy, soft nontender nondistended abdomen without any hepatomegaly.  Clear to auscultation.  No CVA tenderness.  Minimally appreciated increase in size of the right lower extremity as compared to left foot which patient stated that to be chronic since high school days      Assessment / Plan / MDM:  Clinical history and exam concerning for but not limited to:    Pneumonia  Dehydration    We will provide monitorings IV fluids obtain chest x-ray labs viral swab     Chaz Pierre,  MD  07/29/23 0940

## 2023-07-29 NOTE — ED PROVIDER NOTES
Emergency Medicine Note  HPI   HISTORY OF PRESENT ILLNESS     Tobias Little is a 77 y.o. male with past medical history of A-fib, CAD, hyperlipidemia, hypertension presenting to emergency department for evaluation of hypotension and generalized weakness.  Patient reports earlier this week having a fever and generalized weakness for which she was seen and diagnosed at [facility] for pneumonia.  He states he is currently taking Augmentin and a Z-Regis.  He reports he has not had a fever today but did take Aleve prior to arrival.  Patient states he took his blood pressure at home which was low at 70 systolically prompting him to come in for evaluation.  Patient does admit to having a dry cough.  He also reports having chronic leg swelling which has been unchanged.  Patient denies lightheadedness, chest pain, shortness of breath, abdominal pain, nausea, vomiting, rhinorrhea, congestion.            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 (CMS/[facility])    • Hyperlipidemia    • Hypertension    • Obesity        Past Surgical History:   Procedure Laterality Date   • ABLATION OF DYSRHYTHMIC FOCUS  05/2019   • APPENDECTOMY     • HERNIA REPAIR  07/2021    inguinal hernia's   (X2)   • KNEE SURGERY Right 07/2019    infection   • REPLACEMENT TOTAL KNEE Right 02/2019   • SUBDURAL HEMATOMA EVACUATION VIA CRANIOTOMY      Neurosurgery for subdural hematoma        Family History   Problem Relation Age of Onset   • Heart disease Biological Mother    • Lung cancer Biological Father        Social History     Tobacco Use   • Smoking status: Former   • Smokeless tobacco: Former   • Tobacco comments:     Pt Quit Smoking over 40 years ago    Vaping Use   • Vaping Use:  Never used   Substance Use Topics   • Alcohol use: Yes     Comment: Occassionally    • Drug use: No         Review of Systems   REVIEW OF SYSTEMS     Review of Systems   Constitutional: Positive for fever.   HENT: Negative for congestion, rhinorrhea and sore throat.    Respiratory: Positive for cough. Negative for shortness of breath.    Cardiovascular: Negative for chest pain.   Gastrointestinal: Negative for abdominal pain, diarrhea, nausea and vomiting.   Neurological: Positive for weakness (generlized). Negative for light-headedness.         VITALS     ED Vitals    Date/Time Temp Pulse Resp BP SpO2 [facility]   07/29/23 2230 -- 100 15 136/75 98 %    07/29/23 2200 -- 98 20 130/89 98 % AW   07/29/23 2028 -- 88 20 117/71 98 % AW   07/29/23 1930 -- 94 22 135/80 99 % AW   07/29/23 1729 -- 91 18 139/83 99 % SDN   07/29/23 1533 36.1 °C (97 °F) 91 20 131/82 99 % SDN   07/29/23 1133 35.7 °C (96.2 °F) 88 18 117/75 99 %    07/29/23 1130 -- 90 19 117/75 99 %    07/29/23 0904 35.7 °C (96.2 °F) 96 20 99/57 98 % SDN        Pulse Ox %: 98 % (07/29/23 0937)  Pulse Ox Interpretation: Normal (07/29/23 0937)  Heart Rate: 97 (07/29/23 0937)  Rhythm Strip Interpretation: Atrial Flutter (07/29/23 0937)     Physical Exam   PHYSICAL EXAM     Physical Exam  Constitutional:       General: He is not in acute distress.  HENT:      Head: Normocephalic and atraumatic.      Mouth/Throat:      Mouth: Mucous membranes are moist.      Pharynx: No oropharyngeal exudate or posterior oropharyngeal erythema.   Eyes:      Comments: Minimal icteric sclera   Cardiovascular:      Rate and Rhythm: Normal rate and regular rhythm.      Pulses: Normal pulses.   Pulmonary:      Effort: Pulmonary effort is normal.      Breath sounds: Normal breath sounds.   Abdominal:      Palpations: Abdomen is soft.      Tenderness: There is no abdominal tenderness.   Musculoskeletal:      Right lower leg: Edema (Slight swelling compared to left) present.   Skin:     General:  Skin is warm and dry.      Capillary Refill: Capillary refill takes less than 2 seconds.   Neurological:      Mental Status: He is alert and oriented to person, place, and time.           PROCEDURES     Procedures     DATA     Results     Procedure Component Value Units Date/Time    [facility] Draw Panel [[id number]] Collected: 07/29/23 1657    Specimen: Blood, Venous Updated: 07/30/23 0100    Narrative:      The following orders were created for panel order [facility] Draw Panel.  Procedure                               Abnormality         Status                     ---------                               -----------         ------                     [facility] Blood Culture[[id number]]                            Final result                 Please view results for these tests on the individual orders.    [facility] Blood Culture [[id number]] Collected: 07/29/23 1657    Specimen: Blood, Venous Updated: 07/30/23 0100    Extra Tubes [[id number]] Collected: 07/29/23 0931    Specimen: Blood, Venous Updated: 07/29/23 1801    Narrative:      The following orders were created for panel order Extra Tubes.  Procedure                               Abnormality         Status                     ---------                               -----------         ------                     Extra Tube Lt Blue[[id number]]                               Final result                 Please view results for these tests on the individual orders.    Extra Tube Lt Blue [[id number]] Collected: 07/29/23 0931    Specimen: Blood, Venous Updated: 07/29/23 1801    UA with reflex culture [[id number]]  (Abnormal) Collected: 07/29/23 1232    Specimen: Urine, Clean Catch Updated: 07/29/23 1243    Narrative:      The following orders were created for panel order UA with reflex culture.  Procedure                               Abnormality         Status                     ---------                               -----------         ------                     UA Reflex to  Culture (Ma...[[id number]]  Abnormal            Final result               UA Microscopic[[id number]]               Abnormal            Final result                 Please view results for these tests on the individual orders.    UA Microscopic [[id number]]  (Abnormal) Collected: 07/29/23 1232    Specimen: Urine, Clean Catch Updated: 07/29/23 1243     RBC, Urine Too Numerous To Count /HPF      WBC, Urine 0 TO 3 /HPF      Squamous Epithelial None Seen /hpf      Hyaline Cast None Seen /lpf      Bacteria, Urine None Seen /HPF      Mucus Rare /LPF     UA Reflex to Culture (Macroscopic) [[id number]]  (Abnormal) Collected: 07/29/23 1232    Specimen: Urine, Clean Catch Updated: 07/29/23 1240     Color, Urine Yellow     Clarity, Urine Clear     Specific Gravity, Urine 1.021     pH, Urine 6.0     Leukocyte Esterase Negative     Comment: Results can be falsely negative due to high specific gravity, some antibiotics, glucose >3 g/dl, or WBC other than neutrophils.        Nitrite, Urine Negative     Protein, Urine +1     Glucose, Urine Negative mg/dL      Ketones, Urine Negative mg/dL      Urobilinogen, Urine 0.2 EU/dL      Bilirubin, Urine Negative mg/dL      Blood, Urine +3     Comment: The sensitivity of the occult blood test is equivalent to approximately 4 intact RBC/HPF.       Blood Culture Blood, Venous [[id number]] Collected: 07/29/23 1133    Specimen: Blood, Venous Updated: 07/29/23 1201    CBC and differential [[id number]]  (Abnormal) Collected: 07/29/23 0929    Specimen: Blood, Venous Updated: 07/29/23 1152     WBC 5.47 K/uL      RBC --     Comment: Unable to report due to suspected COLD AGGLUTININ        Hemoglobin 13.1 g/dL      Hematocrit --     Comment: Unable to report due to suspected COLD AGGLUTININ        MCV --     Comment: Unable to report due to suspected COLD AGGLUTININ        MCH --     Comment: Unable to report due to suspected COLD AGGLUTININ        MCHC --     Comment: Unable to report due to suspected COLD  AGGLUTININ        RDW --     Comment: NOT MEASURED        Platelets 132 K/uL      MPV 11.4 fL      Differential Type Manu     Neutrophils 61 %      Lymphocytes 23 %      Monocytes 10 %      Eosinophils 5 %      Basophils 0 %      Bands 1 %      Neutrophils, Absolute 3.34 K/uL      Lymphocytes, Absolute 1.26 K/uL      Monocytes, Absolute 0.55 K/uL      Eosinophils, Absolute 0.27 K/uL      Basophils, Absolute 0.00 K/uL      Bands, Absolute 0.05 K/uL      Platelet Estimate Decreased (51,000-149,000)     Giant Platelets Occasional     Anisocytosis 1+     Macrocytes 2+     Rouleaux 1+    Comprehensive metabolic panel [[id number]]  (Abnormal) Collected: 07/29/23 0929    Specimen: Blood, Venous Updated: 07/29/23 1150     Sodium 136 mEQ/L      Potassium 3.9 mEQ/L      Comment: Results obtained on plasma. Plasma Potassium values may be up to 0.4 mEQ/L less than serum values. The differences may be greater for patients with high platelet or white cell counts.        Chloride 104 mEQ/L      CO2 24 mEQ/L      BUN 25 mg/dL      Creatinine 1.2 mg/dL      Glucose 176 mg/dL      Calcium 8.7 mg/dL      AST (SGOT) 50 IU/L      ALT (SGPT) 61 IU/L      Alkaline Phosphatase 74 IU/L      Total Protein 6.9 g/dL      Comment: Test performed on plasma which typically contains approximately 0.4 g/dL more protein than serum.        Albumin 4.1 g/dL      Bilirubin, Total 1.8 mg/dL      eGFR 59.0 mL/min/1.73m*2      Anion Gap 8 mEQ/L     HS Troponin I (with 2 hour reflex) [[id number]]  (Normal) Collected: 07/29/23 0929    Specimen: Blood, Venous Updated: 07/29/23 1015     High Sens Troponin I 8.6 pg/mL     Lactate, w/ reflex repeat if > 2.0 [[id number]]  (Normal) Collected: 07/29/23 0929    Specimen: Blood, Venous Updated: 07/29/23 0951     Lactate 1.6 mmol/L     Blood Culture Blood, Venous [[id number]] Collected: 07/29/23 0929    Specimen: Blood, Venous Updated: 07/29/23 0934          Imaging Results          CT ANGIOGRAPHY CHEST PULMONARY  EMBOLISM WITH IV CONTRAST (Final result)  Result time 07/29/23 18:00:55    Final result                 Impression:    IMPRESSION:  1.  Interstitial fibrosis possibly with mild paraseptal emphysematous change as  discussed below.  2.  New left lower lobe nodule measuring 7 mm. Follow-up recommended 6 months.  3.  No evidence of pulmonary embolism.  4.  Additional findings discussed below.      Fleischner Society 2017 Guidelines for Management of Incidentally Detected  Pulmonary Nodules in Adults. (Solid Nodules)    Nodule size 6-8 mm (single)  Low-Risk Patient - CT at 6-12 months, then consider CT at 18-24 months  High-Risk Patient - CT at 6-12 months, then CT 18-24 months      COMMENT:  Comparison: Chest x-ray dated July 29, 2023  Technique: CTA of the chest pulmonary embolism protocol was performed with  contrast.  3D MIP and/or volume rendered image reconstruction performed and  reviewed. 100 mL Omnipaque 350 given.  CT DOSE:  One or more dose reduction techniques (e.g. automated exposure  control, adjustment of the mA and/or kV according to patient size, use of  iterative reconstruction technique) utilized for this examination.    FINDINGS:  LUNG, PLEURA AND LARGE AIRWAYS:Trachea and the proximal bronchi remain patent.  Bilateral bronchiectasis noted. There is also subpleural reticulation noted  bilaterally compatible with element of fibrosis. There is some honeycombing  present in the right lower lobe posteriorly (for example thin section axial  image 111. This is also present on the 2019 CT. There is also an element of  peripheral cystic spaces in the upper lobes more so on the right very similar to  the prior study. Is uncertain this is more upper lobe peripheral honeycombing or  this represents paraseptal emphysematous change. The distribution of the  peripheral dominant fibrosis and early mild honeycombing is suggestive of UIP.  In the right lower lobe on thin section axial image 129 there is a 9  mm  pulmonary nodule stable compared to 2019. 7 mm nodule left lower lobe image 148  appears to be new. No other suspicious nodular foci. No convincing acute  consolidation. There is no pneumothorax or pleural effusion.        VESSELS: Normal caliber main pulmonary artery. No evidence of pulmonary  embolism. No evidence of acute thoracic aortic findings. There is an ascending  thoracic aortic aneurysm measuring approximate 4.3 cm unchanged.. There is  severe coronary atherosclerotic calcification incompletely assessed. Great  vessel origins are patent.  HEART:  Enlarged. No pericardial effusion beyond physiologic appearing fluid  MEDIASTINUM AND MORELIA: Small scattered hilar and mediastinal lymph nodes none of  which appear pathologic by size criteria  CHEST WALL AND LOWER NECK: Loop recorder left anterior chest.    UPPER ABDOMEN:Limited assessment with this technique. Refer to CT abdomen  pelvis. Small calculus upper pole right kidney.  Cyst left upper pole. There may also be a tiny calculus in the left upper pole.  Atheromatous changes.    BONES: Degenerative disease and Schmorl's nodes. Possible demineralization. No  acute or suspicious bone findings.                 Narrative:    CLINICAL HISTORY: Pulmonary embolism (PE) suspected, high prob  Sclerotic chest findings on chest x-ray with high fever; PE; pneumonia   77 y.o.  male with past medical history of A-fib, CAD, hyperlipidemia, hypertension  presenting to emergency department for evaluation of hypotension and generalized  weakness.  Patient reports earlier this week having a fever and generalized  weakness for which she was seen and diagnosed at [facility] for pneumonia.   He states he is currently taking Augmentin and a Z-Regis.  He reports he  has not had a fever today but did take Aleve prior to arrival.  Patient  states he took his blood pressure at home which was low at 70 systolically  prompting him to come in for evaluation.  Patient does admit to  having a dry  cough.  He also reports having chronic leg swelling which has been  unchanged.  Patient denies lightheadedness, chest pain, shortness of breath,  abdominal pain, nausea, vomiting, rhinorrhea, congestion                               CT ABDOMEN PELVIS WITH IV CONTRAST (Final result)  Result time 07/29/23 18:11:12    Final result                 Impression:    IMPRESSION:  1.  Nonspecific heterogeneous enhancement of the liver.  2.  New small partially enhancing lesion in the right hepatic lobe probably a  small vascular shunt lesion with 2 small to characterize hypodensity in the left  hepatic lobe. Indeterminant mass in the left kidney lower pole. These can be  correlated with follow-up MRI.  3.  Elevated right hemidiaphragm. This is slightly increased since 2011.  4.  Mildly dilated loops of small bowel nonspecific without a clear transition  point could represent a mild ileus or low-grade partial small bowel obstruction.  5.  No evidence of additional acute inflammatory obstructive process in abdomen  or pelvis.  6.  Multiple additional findings discussed below.    COMMENT:    Comparison: CT dated 5/26/2011    TECHNIQUE: CT abdomen pelvis from with contrast. 100 mL Omnipaque 350.  CT DOSE:  One or more dose reduction techniques (e.g. automated exposure  control, adjustment of the mA and/or kV according to patient size, use of  iterative reconstruction technique) utilized for this examination.    FINDINGS:  LOWER CHEST: CT chest CT dictation.    ABDOMEN:  LIVER: Liver demonstrates heterogeneous enhancement is nonspecific. Elevated  right hemidiaphragm is noted slightly increased compared to 2011 CT  image.  No significant enlargement of the liver which measures about 16 cm coronal  plane. Small arterially enhancing lesion suspected vascular shunt lesion is  noted laterally in the right hepatic lobe. Tiny low-density lesion in the left  hepatic lobe medial segment measuring 0.7 cm too small to  characterize possibly  a small cyst. These lesions are new from 2004 CT study.  BILE DUCTS: normal caliber.  GALLBLADDER: No calcified gallstones. Normal caliber wall.  PANCREAS: Pancreatic divisum. Top normal caliber pancreatic duct. No suspicious  or acute pancreatic findings.  SPLEEN: within normal limits.  ADRENALS: within normal limits.  KIDNEYS: No hydronephrosis. Areas of scarring in the left kidney.  Indeterminant small mass exophytic from the lower pole of the right kidney. This  could represent a complex cyst or a solid lesion. This is new since 2004. This  measures about 1.8 cm in the coronal plane. 3 mm calculus upper pole right  kidney.    PELVIS:  REPRODUCTIVE ORGANS: Enlarged prostate incompletely assessed.  URETERS: within normal limits.  BLADDER: within normal limits.      BOWEL: Severe colonic diverticulosis. There is no evidence of diverticulitis.  Normal appearance of terminal ileum. Patient appears to be status post  appendectomy. There is a single short segment mildly dilated loop of small bowel  in left upper quadrant image 58. In the ventral abdomen there is also some  slight dilatation of loops of bowel. No discrete transition zone. Finds could be  on the basis of ileus or potentially a low-grade small bowel obstruction as the  distal small bowel is normal in caliber.  Small diverticula are noted in the terminal ileum.. No commencing small bowel  obstruction or significant ileus. Stomach is underdistended. No enlarged  mesenteric lymph nodes.  PERITONEUM: no ascites or free air, no fluid collection.  VESSELS: Patency of major opacified vasculature. No abdominal aortic aneurysm.  RETROPERITONEUM: within normal limits.  ABDOMINAL WALL: Mild diastases rectus abdominis muscles. Mild dependent edema.  BONES: There are degenerative changes present. Probable demineralization. No  acute or suspicious bone findings..                 Narrative:    CLINICAL HISTORY: Abdominal abscess/infection  suspected  Fever 103.32 days ago with now elevated LFTs   77 y.o. male with past medical  history of A-fib, CAD, hyperlipidemia, hypertension presenting to emergency  department for evaluation of hypotension and generalized weakness.  Patient  reports earlier this week having a fever and generalized weakness for which she  was seen and diagnosed at [facility] for pneumonia.  He states he is  currently taking Augmentin and a Z-Regis.  He reports he has not had a fever  today but did take Aleve prior to arrival.  Patient states he took his blood  pressure at home which was low at 70 systolically prompting him to come in for  evaluation.  Patient does admit to having a dry cough.  He also reports  having chronic leg swelling which has been unchanged.  Patient denies  lightheadedness, chest pain, shortness of breath, abdominal pain, nausea,  vomiting, rhinorrhea, congestion                               US venous leg bilateral (Final result)  Result time 07/29/23 10:16:42    Final result                 Impression:    IMPRESSION:  No evidence of acute deep venous thrombosis in the visualized venous segments of  the bilateral lower extremities.                 Narrative:    EXAM: US VENOUS LEG BILATERAL    CLINICAL HISTORY: swelling    COMPARISON: None    PROCEDURE: Ultrasound examination of both lower extremity deep venous systems  was performed using grayscale, color and spectral Doppler.    COMMENT:    RIGHT: The right common femoral, saphenofemoral junction, femoral, and popliteal  veins demonstrate a normal response to compression maneuvers. There is also a  normal response to respiratory variation. The bifurcation of the common femoral  vein into the superficial and deep femoral veins is visualized.  Flow is  identified in these vessels with no evidence of intraluminal thrombus on either  color Doppler or grayscale images. The visualized portions of the right proximal  calf veins are also normal.    LEFT: The  left common femoral, saphenofemoral junction, femoral, and popliteal  veins demonstrate a normal response to compression maneuvers. There is also a  normal response to respiratory variation. The bifurcation of the common femoral  vein into the superficial and deep femoral veins is visualized.  Flow is  identified in these vessels with no evidence of intraluminal thrombus on either  color Doppler or grayscale images. The visualized portions of the left proximal  calf veins are also normal.                               X-RAY CHEST 2 VIEWS (Final result)  Result time 07/29/23 09:40:53    Final result                 Impression:    IMPRESSION:  No significant change from prior. Coarse subpleural reticulation and areas of  patchy coarse interstitial opacity likely related to interstitial fibrotic  changes as seen on prior CT. No pleural effusion or pneumothorax.               Narrative:    CLINICAL HISTORY: Chest Pain/Arrhythmia    COMPARISON: Elisabet 15, 2019 and June 24, 2019.    COMMENT:    TECHNIQUE: PA and lateral views of the chest were performed.    LINES/TUBES/HARDWARE: Loop recorder overlies the left chest. Monitoring  leads/wires overly the patient.    LUNGS AND PLEURA: See Impression.    CARDIOMEDIASTINUM: The cardiomediastinal silhouette is stable    SKELETON/OTHER: Degenerative changes of the spine and shoulders.                                ECG 12 lead   Independent Interpretation by ED Provider   Aflutter with PVC          Scoring tools                                  ED Course & MDM   MDM / ED COURSE / CLINICAL IMPRESSION / DISPO     Medical Decision Making  We will obtain septic work-up.  We will also obtain LFTs given minimally icteric sclera.   Given increased LFTs and too numerous to count red blood cells in urine and previous diagnosis of pneumonia we will obtain CT chest abdomen pelvis.    Elevated liver enzymes: acute illness or injury  Generalized weakness: acute illness or injury  Microscopic  hematuria: acute illness or injury  Amount and/or Complexity of Data Reviewed  Labs: ordered. Decision-making details documented in ED Course.  Radiology: ordered. Decision-making details documented in ED Course.  ECG/medicine tests: ordered and independent interpretation performed.      Risk  Prescription drug management.          ED Course as of 07/30/23 0555   Sat Jul 29, 2023   1022 High Sens Troponin I: 8.6  1st [VL]   1023 X-RAY CHEST 2 VIEWS  IMPRESSION:  No significant change from prior. Coarse subpleural reticulation and areas of  patchy coarse interstitial opacity likely related to interstitial fibrotic  changes as seen on prior CT. No pleural effusion or pneumothorax. [VL]   1024 US venous leg bilateral  IMPRESSION:  No evidence of acute deep venous thrombosis in the visualized venous segments of  the bilateral lower extremities. [VL]   1344 RBC, Urine(!): Too Numerous To Count [HL]   1344 AST (SGOT)(!): 50 [HL]   1344 ALT (SGPT)(!): 61 [HL]   1344 Due to recent high-fever with now elevated LFTs along with hematuria, will obtain CT chest with IV contrast (abnormal chest x-ray not obvious infectious) along with abdomen pelvis [HL]   1348 Updated pt. Plan to get CT chest/ab/pelvis [VL]   1510 BP: 117/75 [HL]   1903 CT ANGIOGRAPHY CHEST PULMONARY EMBOLISM WITH IV CONTRAST  --  IMPRESSION:  1.  Interstitial fibrosis possibly with mild paraseptal emphysematous change as  discussed below.  2.  New left lower lobe nodule measuring 7 mm. Follow-up recommended 6 months.  3.  No evidence of pulmonary embolism.  4.  Additional findings discussed below. [EG]   1905 CT ABDOMEN PELVIS WITH IV CONTRAST  --  IMPRESSION:  1.  Nonspecific heterogeneous enhancement of the liver.  2.  New small partially enhancing lesion in the right hepatic lobe probably a  small vascular shunt lesion with 2 small to characterize hypodensity in the left  hepatic lobe. Indeterminant mass in the left kidney lower pole. These can  be  correlated with follow-up MRI.  3.  Elevated right hemidiaphragm. This is slightly increased since 2011.  4.  Mildly dilated loops of small bowel nonspecific without a clear transition  point could represent a mild ileus or low-grade partial small bowel obstruction.  5.  No evidence of additional acute inflammatory obstructive process in abdomen  or pelvis.  6.  Multiple additional findings discussed below.    [EG]   1925 Patient updated on results of CT scan including need for close follow-up with urology for evaluation of kidney mass.  Paged surgery with regards to mild ileus versus partial small bowel obstruction. [EG]   1926 D/w surgery who will eval [EG]   2114 D/w surgery - recommend observation for light volume/clear diet for management of partial SBO.  Would like admission to [facility]  [EG]   2124 Patient updated - agreeable with plan.  Paged [facility] [place] [facility]  [EG]   2132 D/w [facility] for admission [EG]      ED Course User Index  [EG] Silke Raymundo PA C  [HL] Chaz Pierre MD  [VL] Rubi Finn PA C     Clinical Impression      Microscopic hematuria   Generalized weakness   Elevated liver enzymes   Pulmonary nodule   Interstitial pulmonary fibrosis (CMS/HCC)   Liver lesion   Kidney mass     _________________     ED Disposition   Admit / Observation                   Rubi Finn PA C  07/30/23 0556

## 2023-07-29 NOTE — DISCHARGE INSTRUCTIONS
You were admitted to [facility] on 7/29 after presenting to hospital with hypotension.  CT imaging on admission noted concern for possible bowel obstruction, you have been having flatus/bowel movements and advance diet without symptoms.  You were seen in consultation by surgery and no intervention was required.    Also while admitted, you were seen in consultation by urology for microscopic hematuria (blood in the urine) and incidentally noted kidney lesion.  No inpatient intervention was required and you will follow-up as outpatient for complete hematuria work-up.  MRI abdomen was taken in follow-up of indeterminant kidney lesion, and is consistent with proteinaceous cyst, may also discuss this with urology upon follow-up though no acute intervention is required.    MRI of the abdomen was also taken in follow-up of indeterminate liver lesion.  MRI showed evidence of mild hepatic steatosis which can be seen with chronic compensated liver disease, and a subcentimeter cyst.  No acute intervention was required that you should also follow-up with PCP for ongoing liver function monitoring, and possibly GI follow-up which PCP may help arrange.    Also incidentally found to have a left lower lobe lung nodule, measuring 7 mm.  Will need repeat CT of the chest in 6 months and to consider pulmonology follow-up, PCP may help arrange.      Important Medication changes:   -Continue taking Augmentin through 8/1 to complete course for pneumonia  -Stop taking rosuvastatin until follow-up with PCP for liver function monitoring      Please follow up with neurology within 1 to 2 weeks for complete hematuria work-up, and your PCP within 1 week with repeat CBC and CMP.  Consider pulmonology and GI follow-up based on incidental findings above which PCP may help arrange.      MRI abdomen, IMPRESSION:   Mild hepatic steatosis with morphologic changes which can be seen in the setting   of chronic compensated liver disease (fibrosis  or developing cirrhosis).   Subcentimeter lesion seen on CT represents a subcentimeter cyst.  No suspicious   hepatic mass seen within visualized portions.     Bilateral renal cysts.  Left lower pole lesion seen on CT represents a   proteinaceous cyst.     No bowel obstruction.  Sigmoid diverticulosis without diverticulitis.       CT chest, IMPRESSION:   1.  Interstitial fibrosis possibly with mild paraseptal emphysematous change as   discussed below.   2.  New left lower lobe nodule measuring 7 mm. Follow-up recommended 6 months.

## 2023-07-30 ENCOUNTER — APPOINTMENT (INPATIENT)
Dept: RADIOLOGY | Facility: HOSPITAL | Age: 78
DRG: 388 | End: 2023-07-30
Attending: HOSPITALIST
Payer: COMMERCIAL

## 2023-07-30 PROBLEM — R91.1 LUNG NODULE SEEN ON IMAGING STUDY: Status: ACTIVE | Noted: 2023-07-30

## 2023-07-30 PROBLEM — R93.5 ABNORMAL COMPUTED TOMOGRAPHY OF ABDOMEN AND PELVIS: Status: ACTIVE | Noted: 2023-07-30

## 2023-07-30 PROBLEM — R79.89 ELEVATED LFTS: Status: ACTIVE | Noted: 2023-07-30

## 2023-07-30 LAB
AGGLUTINATED RBCS, BLOOD SMEAR: ABNORMAL
ALBUMIN SERPL-MCNC: 3.6 G/DL (ref 3.5–5.7)
ALP SERPL-CCNC: 59 IU/L (ref 34–125)
ALT SERPL-CCNC: 44 IU/L (ref 7–52)
ANION GAP SERPL CALC-SCNC: 9 MEQ/L (ref 3–15)
AST SERPL-CCNC: 33 IU/L (ref 13–39)
ATRIAL RATE: 242
BASOPHILS # BLD: 0.05 K/UL (ref 0.01–0.1)
BASOPHILS NFR BLD: 1 %
BILIRUB SERPL-MCNC: 1.8 MG/DL (ref 0.3–1.2)
BUN SERPL-MCNC: 18 MG/DL (ref 7–25)
CALCIUM SERPL-MCNC: 8 MG/DL (ref 8.6–10.3)
CHLORIDE SERPL-SCNC: 108 MEQ/L (ref 98–107)
CO2 SERPL-SCNC: 22 MEQ/L (ref 21–31)
CREAT SERPL-MCNC: 0.9 MG/DL (ref 0.7–1.3)
DIFFERENTIAL METHOD BLD: ABNORMAL
EOSINOPHIL # BLD: 0.21 K/UL (ref 0.04–0.54)
EOSINOPHIL NFR BLD: 4 %
ERYTHROCYTE [DISTWIDTH] IN BLOOD BY AUTOMATED COUNT: ABNORMAL %
GFR SERPL CREATININE-BSD FRML MDRD: >60 ML/MIN/1.73M*2
GLUCOSE SERPL-MCNC: 91 MG/DL (ref 70–99)
HCT VFR BLDCO AUTO: ABNORMAL %
HGB BLD-MCNC: 11.8 G/DL (ref 13.7–17.5)
LYMPHOCYTES # BLD: 2.16 K/UL (ref 1.2–3.5)
LYMPHOCYTES NFR BLD: 42 %
MAGNESIUM SERPL-MCNC: 1.9 MG/DL (ref 1.8–2.5)
MCH RBC QN AUTO: ABNORMAL PG
MCHC RBC AUTO-ENTMCNC: ABNORMAL G/DL
MCV RBC AUTO: ABNORMAL FL
MONOCYTES # BLD: 0.05 K/UL (ref 0.3–1)
MONOCYTES NFR BLD: 1 %
NEUTS BAND # BLD: 2.42 K/UL (ref 1.7–7)
NEUTS SEG NFR BLD: 47 %
PDW BLD AUTO: 11 FL (ref 9.4–12.4)
PLATELET # BLD AUTO: 130 K/UL (ref 150–350)
PLATELET # BLD EST: ABNORMAL 10*3/UL
POLYCHROMASIA BLD QL SMEAR: ABNORMAL
POTASSIUM SERPL-SCNC: 4.1 MEQ/L (ref 3.5–5.1)
PROT SERPL-MCNC: 5.7 G/DL (ref 6–8.2)
QRS DURATION: 96
QT INTERVAL: 362
QTC CALCULATION(BAZETT): 462
R AXIS: 48
RBC # BLD AUTO: ABNORMAL 10*6/UL
ROULEAUX BLD QL SMEAR: ABNORMAL
SODIUM SERPL-SCNC: 139 MEQ/L (ref 136–145)
T WAVE AXIS: -36
VARIANT LYMPHS # BLD MANUAL: 0.26 K/UL
VARIANT LYMPHS NFR BLD: 5 %
VENTRICULAR RATE: 98
WBC # BLD AUTO: 5.14 K/UL (ref 3.8–10.5)

## 2023-07-30 PROCEDURE — A9585 GADOBUTROL INJECTION: HCPCS | Performed by: HOSPITALIST

## 2023-07-30 PROCEDURE — 12000000 HC ROOM AND CARE MED/SURG

## 2023-07-30 PROCEDURE — 63700000 HC SELF-ADMINISTRABLE DRUG: Performed by: HOSPITALIST

## 2023-07-30 PROCEDURE — 72197 MRI PELVIS W/O & W/DYE: CPT | Mod: ME

## 2023-07-30 PROCEDURE — 83735 ASSAY OF MAGNESIUM: CPT | Performed by: HOSPITALIST

## 2023-07-30 PROCEDURE — A9579 GAD-BASE MR CONTRAST NOS,1ML: HCPCS | Performed by: HOSPITALIST

## 2023-07-30 PROCEDURE — 99233 SBSQ HOSP IP/OBS HIGH 50: CPT | Performed by: STUDENT IN AN ORGANIZED HEALTH CARE EDUCATION/TRAINING PROGRAM

## 2023-07-30 PROCEDURE — 80053 COMPREHEN METABOLIC PANEL: CPT | Performed by: HOSPITALIST

## 2023-07-30 PROCEDURE — 36415 COLL VENOUS BLD VENIPUNCTURE: CPT | Performed by: HOSPITALIST

## 2023-07-30 PROCEDURE — 85025 COMPLETE CBC W/AUTO DIFF WBC: CPT | Performed by: HOSPITALIST

## 2023-07-30 PROCEDURE — 93010 ELECTROCARDIOGRAM REPORT: CPT | Performed by: STUDENT IN AN ORGANIZED HEALTH CARE EDUCATION/TRAINING PROGRAM

## 2023-07-30 PROCEDURE — G1004 CDSM NDSC: HCPCS

## 2023-07-30 PROCEDURE — 1123F ACP DISCUSS/DSCN MKR DOCD: CPT | Performed by: STUDENT IN AN ORGANIZED HEALTH CARE EDUCATION/TRAINING PROGRAM

## 2023-07-30 RX ORDER — GADOBUTROL 604.72 MG/ML
10 INJECTION INTRAVENOUS ONCE
Status: COMPLETED | OUTPATIENT
Start: 2023-07-30 | End: 2023-07-30

## 2023-07-30 RX ORDER — GADOBUTROL 604.72 MG/ML
0.1 INJECTION INTRAVENOUS ONCE
Status: COMPLETED | OUTPATIENT
Start: 2023-07-30 | End: 2023-07-30

## 2023-07-30 RX ADMIN — METOPROLOL SUCCINATE 100 MG: 100 TABLET, EXTENDED RELEASE ORAL at 08:48

## 2023-07-30 RX ADMIN — AZITHROMYCIN MONOHYDRATE 500 MG: 250 TABLET ORAL at 08:47

## 2023-07-30 RX ADMIN — GADOBUTROL 10 ML: 604.72 INJECTION INTRAVENOUS at 14:47

## 2023-07-30 RX ADMIN — AMOXICILLIN AND CLAVULANATE POTASSIUM 1 TABLET: 875; 125 TABLET, FILM COATED ORAL at 18:00

## 2023-07-30 RX ADMIN — AMOXICILLIN AND CLAVULANATE POTASSIUM 1 TABLET: 875; 125 TABLET, FILM COATED ORAL at 08:47

## 2023-07-30 RX ADMIN — Medication 5000 UNITS: at 08:47

## 2023-07-30 RX ADMIN — APIXABAN 5 MG: 5 TABLET, FILM COATED ORAL at 21:07

## 2023-07-30 RX ADMIN — APIXABAN 5 MG: 5 TABLET, FILM COATED ORAL at 08:48

## 2023-07-30 RX ADMIN — GADOBUTROL 10 ML: 604.72 INJECTION INTRAVENOUS at 15:32

## 2023-07-30 RX ADMIN — LISINOPRIL 40 MG: 40 TABLET ORAL at 08:48

## 2023-07-30 RX ADMIN — LATANOPROST 1 DROP: 50 SOLUTION/ DROPS OPHTHALMIC at 21:05

## 2023-07-30 RX ADMIN — AMLODIPINE BESYLATE 5 MG: 5 TABLET ORAL at 08:48

## 2023-07-30 RX ADMIN — ROSUVASTATIN CALCIUM 10 MG: 10 TABLET, FILM COATED ORAL at 21:07

## 2023-07-30 ASSESSMENT — COGNITIVE AND FUNCTIONAL STATUS - GENERAL
CLIMB 3 TO 5 STEPS WITH RAILING: 4 - NONE
STANDING UP FROM CHAIR USING ARMS: 4 - NONE
WALKING IN HOSPITAL ROOM: 4 - NONE
CLIMB 3 TO 5 STEPS WITH RAILING: 4 - NONE
WALKING IN HOSPITAL ROOM: 4 - NONE
STANDING UP FROM CHAIR USING ARMS: 4 - NONE
MOVING TO AND FROM BED TO CHAIR: 4 - NONE
MOVING TO AND FROM BED TO CHAIR: 4 - NONE

## 2023-07-30 NOTE — ASSESSMENT & PLAN NOTE
CT chest PE study with finding of new left lower lobe nodule measuring up to 7 mm.    -Follow-up CT chest in 6 months.  -Outpatient follow-up with PCP/pulmonology for further recommendations.

## 2023-07-30 NOTE — ASSESSMENT & PLAN NOTE
"Mild elevation of ALT at 61, AST at 50 and total bilirubin 1.8 on admission  CT abdomen pelvis with findings of new partially enhancing lesion in the right hepatic lobe  MRI abdomen showing \"mild hepatic steatosis with morphologic changes which can be seen in the setting of chronic compensated liver disease (fibrosis or developing cirrhosis). Subcentimeter lesion seen on CT represents a subcentimeter cyst.  No suspicious hepatic mass seen within visualized portions.\"  Recently patient had his statin stopped due to the elevated LFTs and has been limiting Tylenol intake    -Exam is benign, no further inpatient work up indicated   -Continue off of statin   -Follow up with PCP/GI as outpatient with repeat CMP  "

## 2023-07-30 NOTE — ASSESSMENT & PLAN NOTE
"CT abdomen pelvis indeterminate mass in the left kidney lower pole  MRI abdomen showing, \"Bilateral renal cysts.  Left lower pole lesion seen on CT represents a proteinaceous cyst,\" and hepatic changes per LFT plan     -No acute intervention  -Will follow up with Urology for hematuria work up outpatient, my seek further recommendations at this time  "

## 2023-07-30 NOTE — PROGRESS NOTES
Hospital Medicine Service -  Daily Progress Note       SUBJECTIVE   Seen and examined at bedside. He has no complaints. He is having BM's and feeling well     OBJECTIVE      Vital signs in last 24 hours:  Temp:  [36.1 °C (97 °F)-36.7 °C (98.1 °F)] 36.3 °C (97.3 °F)  Heart Rate:  [] 104  Resp:  [15-22] 16  BP: (117-139)/(71-89) 132/80    Intake/Output Summary (Last 24 hours) at 7/30/2023 1350  Last data filed at 7/30/2023 0852  Gross per 24 hour   Intake 918 ml   Output --   Net 918 ml       PHYSICAL EXAMINATION      Constitutional: no acute distress,  ENMT: moist mucous membranes  CV: RRR, no murmurs detected  Pulm: normal air entry, CTAB,   GI: Bowel sounds are normal, soft  Neuro: awake, alert,          LINES, CATHETERS, DRAINS, AIRWAYS, AND WOUNDS   Lines, Drains, and Airways:  Wounds (agree with documentation and present on admission):  Peripheral IV (Adult) 07/29/23 Right Antecubital (Active)   Number of days: 1         Comments:      LABS / IMAGING / TELE      Labs  Lab Results   Component Value Date    WBC 5.14 07/30/2023    HGB 11.8 (L) 07/30/2023    HCT  07/30/2023      Comment:      Unable to report due to suspected COLD AGGLUTININ    MCV  07/30/2023      Comment:      Unable to report due to suspected COLD AGGLUTININ     (L) 07/30/2023     Lab Results   Component Value Date    GLUCOSE 91 07/30/2023    CALCIUM 8.0 (L) 07/30/2023     07/30/2023    K 4.1 07/30/2023    CO2 22 07/30/2023     (H) 07/30/2023    BUN 18 07/30/2023    CREATININE 0.9 07/30/2023     No results found for: INR, PROTIME    Micro  Microbiology Results     ** No results found for the last 720 hours. **          Imaging  CT ABDOMEN PELVIS WITH IV CONTRAST    Result Date: 7/29/2023  IMPRESSION: 1.  Nonspecific heterogeneous enhancement of the liver. 2.  New small partially enhancing lesion in the right hepatic lobe probably a small vascular shunt lesion with 2 small to characterize hypodensity in the left hepatic  lobe. Indeterminant mass in the left kidney lower pole. These can be correlated with follow-up MRI. 3.  Elevated right hemidiaphragm. This is slightly increased since 2011. 4.  Mildly dilated loops of small bowel nonspecific without a clear transition point could represent a mild ileus or low-grade partial small bowel obstruction. 5.  No evidence of additional acute inflammatory obstructive process in abdomen or pelvis. 6.  Multiple additional findings discussed below. COMMENT: Comparison: CT dated 5/26/2011 TECHNIQUE: CT abdomen pelvis from with contrast. 100 mL Omnipaque 350. CT DOSE:  One or more dose reduction techniques (e.g. automated exposure control, adjustment of the mA and/or kV according to patient size, use of iterative reconstruction technique) utilized for this examination. FINDINGS: LOWER CHEST: CT chest CT dictation. ABDOMEN: LIVER: Liver demonstrates heterogeneous enhancement is nonspecific. Elevated right hemidiaphragm is noted slightly increased compared to 2011 CT  image. No significant enlargement of the liver which measures about 16 cm coronal plane. Small arterially enhancing lesion suspected vascular shunt lesion is noted laterally in the right hepatic lobe. Tiny low-density lesion in the left hepatic lobe medial segment measuring 0.7 cm too small to characterize possibly a small cyst. These lesions are new from 2004 CT study. BILE DUCTS: normal caliber. GALLBLADDER: No calcified gallstones. Normal caliber wall. PANCREAS: Pancreatic divisum. Top normal caliber pancreatic duct. No suspicious or acute pancreatic findings. SPLEEN: within normal limits. ADRENALS: within normal limits. KIDNEYS: No hydronephrosis. Areas of scarring in the left kidney. Indeterminant small mass exophytic from the lower pole of the right kidney. This could represent a complex cyst or a solid lesion. This is new since 2004. This measures about 1.8 cm in the coronal plane. 3 mm calculus upper pole right kidney.  PELVIS: REPRODUCTIVE ORGANS: Enlarged prostate incompletely assessed. URETERS: within normal limits. BLADDER: within normal limits. BOWEL: Severe colonic diverticulosis. There is no evidence of diverticulitis. Normal appearance of terminal ileum. Patient appears to be status post appendectomy. There is a single short segment mildly dilated loop of small bowel in left upper quadrant image 58. In the ventral abdomen there is also some slight dilatation of loops of bowel. No discrete transition zone. Finds could be on the basis of ileus or potentially a low-grade small bowel obstruction as the distal small bowel is normal in caliber. Small diverticula are noted in the terminal ileum.. No commencing small bowel obstruction or significant ileus. Stomach is underdistended. No enlarged mesenteric lymph nodes. PERITONEUM: no ascites or free air, no fluid collection. VESSELS: Patency of major opacified vasculature. No abdominal aortic aneurysm. RETROPERITONEUM: within normal limits. ABDOMINAL WALL: Mild diastases rectus abdominis muscles. Mild dependent edema. BONES: There are degenerative changes present. Probable demineralization. No acute or suspicious bone findings..     CT ANGIOGRAPHY CHEST PULMONARY EMBOLISM WITH IV CONTRAST    Result Date: 7/29/2023  IMPRESSION: 1.  Interstitial fibrosis possibly with mild paraseptal emphysematous change as discussed below. 2.  New left lower lobe nodule measuring 7 mm. Follow-up recommended 6 months. 3.  No evidence of pulmonary embolism. 4.  Additional findings discussed below. Fleischner Society 2017 Guidelines for Management of Incidentally Detected Pulmonary Nodules in Adults. (Solid Nodules) Nodule size 6-8 mm (single) Low-Risk Patient - CT at 6-12 months, then consider CT at 18-24 months High-Risk Patient - CT at 6-12 months, then CT 18-24 months COMMENT: Comparison: Chest x-ray dated July 29, 2023 Technique: CTA of the chest pulmonary embolism protocol was performed with  contrast.  3D MIP and/or volume rendered image reconstruction performed and reviewed. 100 mL Omnipaque 350 given. CT DOSE:  One or more dose reduction techniques (e.g. automated exposure control, adjustment of the mA and/or kV according to patient size, use of iterative reconstruction technique) utilized for this examination. FINDINGS: LUNG, PLEURA AND LARGE AIRWAYS:Trachea and the proximal bronchi remain patent. Bilateral bronchiectasis noted. There is also subpleural reticulation noted bilaterally compatible with element of fibrosis. There is some honeycombing present in the right lower lobe posteriorly (for example thin section axial image 111. This is also present on the 2019 CT. There is also an element of peripheral cystic spaces in the upper lobes more so on the right very similar to the prior study. Is uncertain this is more upper lobe peripheral honeycombing or this represents paraseptal emphysematous change. The distribution of the peripheral dominant fibrosis and early mild honeycombing is suggestive of UIP. In the right lower lobe on thin section axial image 129 there is a 9 mm pulmonary nodule stable compared to 2019. 7 mm nodule left lower lobe image 148 appears to be new. No other suspicious nodular foci. No convincing acute consolidation. There is no pneumothorax or pleural effusion. VESSELS: Normal caliber main pulmonary artery. No evidence of pulmonary embolism. No evidence of acute thoracic aortic findings. There is an ascending thoracic aortic aneurysm measuring approximate 4.3 cm unchanged.. There is severe coronary atherosclerotic calcification incompletely assessed. Great vessel origins are patent. HEART:  Enlarged. No pericardial effusion beyond physiologic appearing fluid MEDIASTINUM AND MORELIA: Small scattered hilar and mediastinal lymph nodes none of which appear pathologic by size criteria CHEST WALL AND LOWER NECK: Loop recorder left anterior chest. UPPER ABDOMEN:Limited assessment with  this technique. Refer to CT abdomen pelvis. Small calculus upper pole right kidney. Cyst left upper pole. There may also be a tiny calculus in the left upper pole. Atheromatous changes. BONES: Degenerative disease and Schmorl's nodes. Possible demineralization. No acute or suspicious bone findings.     US venous leg bilateral    Result Date: 7/29/2023  IMPRESSION: No evidence of acute deep venous thrombosis in the visualized venous segments of the bilateral lower extremities.     X-RAY CHEST 2 VIEWS    Result Date: 7/29/2023  IMPRESSION: No significant change from prior. Coarse subpleural reticulation and areas of patchy coarse interstitial opacity likely related to interstitial fibrotic changes as seen on prior CT. No pleural effusion or pneumothorax.         ASSESSMENT AND PLAN      * Partial small bowel obstruction (CMS/HCC)  Assessment & Plan  Assessment: CT abdomen pelvis with findings of concern for mildly dilated loops of small bowel nonspecific without a clear transition point which could represent a mild ileus or low-grade partial small bowel obstruction.    Surgery consulted - he is passing gas and having BM's, no intervention at this time    Clear liquid diet as tolerated.  Serial abdominal exams.        Elevated LFTs  Assessment & Plan  Assessment: Mild elevation of ALT at 61, AST at 50 and total bilirubin 1.8.  CT abdomen pelvis with findings of new partially enhancing lesion in the right hepatic lobe probably a small vascular shunt lesion with too small to characterize hypodensity in the left hepatic lobe.  Suspect that this may be contributing to the abnormal LFTs.  Recently patient had his statin stopped due to the elevated LFTs and has been limiting Tylenol intake.    Plan:  Continue to monitor LFTs closely.  Obtain MRI of the abdomen/pelvis to assess further.    Abnormal computed tomography of abdomen and pelvis  Assessment & Plan  Assessment: CT abdomen pelvis with multiple incidental findings  including a small partially enhancing lesion in the right hepatic lobe and an indeterminate mass in the left kidney lower pole.    Plan:  Will obtain MRI of the abdomen/pelvis for further assessment.  Urology consult for recommendations regarding renal mass.  Suspect that microscopic hematuria may be related to the renal mass.      Lung nodule seen on imaging study  Assessment & Plan  Assessment: CT chest PE study with finding of new left lower lobe nodule measuring up to 7 mm.    Plan:  Follow-up CT chest in 6 months.  Outpatient follow-up with pulmonology for further recommendations.    History of atrial fibrillation  Assessment & Plan   Maintained on Toprol- mg daily for rate control and Eliquis 5 mg twice daily.        Essential hypertension  Assessment & Plan  BP well controlled  Cont  amlodipine 5 mg daily, lisinopril 40 mg daily and Toprol- mg daily.           VTE Assessment: [place] VTE Score: 6  VTE Prophylaxis:  Current anticoagulants:  apixaban (ELIQUIS) tablet 5 mg, oral, BID      Code Status: Full Code  Palliative Care Screening Score: 1   Estimated Discharge Date: 8/1/2023  Disposition Planning: maintain current level of care     Cayla Wright MD  7/30/2023

## 2023-07-30 NOTE — CONSULTS
Urology Consult    Patient seen and evaluated, agree with below.  Patient is a 77-year-old male admitted with hypotension.  Patient was noted to have microscopic hematuria.  CT with IV contrast revealed a 1.8 cm complex left lower pole mass.    Abdomen: Soft, no CVA tenderness  Creatinine: 0.9    77-year-old male with remote tobacco history, with microscopic hematuria and 1.8 cm left lower pole renal mass.  Patient will require a cystoscopy, this can be as an outpatient.  Check urine culture.  Follow-up with  as an outpatient.    Subjective     Swathi Little is a 77 y.o. male who was admitted for Kidney mass [N28.89]  Pulmonary nodule [R91.1]  Elevated liver enzymes [R74.8]  Microscopic hematuria [R31.29]  Liver lesion [K76.9]  Partial small bowel obstruction (CMS/HCC) [K56.600]  Interstitial pulmonary fibrosis (CMS/HCC) [J84.10]  Generalized weakness [R53.1]. 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  States that he has had dark-colored urine but no swathi hematuria to his eye.  Denies urgency, frequency, dysuria, incomplete emptying, hesitancy, double void, nocturia.  Former smoker 10-pack-year history but quit 50 years ago.  No personal or family history of  cancers.    Most recent lab work remarkable for WBC 5.1, hemoglobin 11.8, creatinine 0.9.  UA demonstrates negative LE, negative nitrites, TNTC RBC, 0-3 WBC, no bacteria.  CT of the abdomen pelvis demonstrates a 1.8 cm left lower pole renal lesion.    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)    • Hyperlipidemia    • Hypertension    • Obesity        Surgical History:   Past Surgical History:   Procedure Laterality Date   • ABLATION OF DYSRHYTHMIC FOCUS  05/2019   • APPENDECTOMY     • HERNIA REPAIR  07/2021    inguinal hernia's   (X2)   • KNEE SURGERY Right 07/2019    infection   • REPLACEMENT TOTAL KNEE Right 02/2019   • SUBDURAL HEMATOMA EVACUATION VIA CRANIOTOMY      Neurosurgery for subdural hematoma        Social History:   Social History     Social History Narrative   • Not on file       Family History:   Family History   Problem Relation Age of Onset   • Heart disease Biological Mother    • Lung cancer Biological Father        Allergies:   No known allergies    Home Medications:  •  amLODIPine, Take 5 mg by mouth daily.  •  amoxicillin, TAKE 4 CAPSULES BY MOUTH 1 HOUR PRIOR TO APPOINTMENT  •  apixaban, Take 5 mg by mouth 2 (two) times a day.  •  cholecalciferol, vitamin D3, (VITAMIN D3 ORAL), Take 5,000 IU by mouth daily.  •  lisinopriL, Take 40 mg by mouth daily.  •  metoprolol succinate XL, Take 100 mg by mouth daily.   •  rosuvastatin, Take 1 tablet (10 mg total) by mouth once daily.  •  coenzyme Q10, Take 200 mg by mouth daily.    •  latanoprost, Administer 1 drop into affected eye(s) nightly. Into left eye  •  vitamin B complex (B COMPLEX ORAL), Take by mouth daily.  •  ZINC ORAL, Take by mouth daily.    Current Medications:  •  amLODIPine, 5 mg, oral, Daily  •  amoxicillin-pot clavulanate, 1 tablet, oral, BID with meals  •  apixaban, 5 mg, oral, BID  •  cholecalciferol (vitamin D3), 5,000 Units, oral, Daily  •  glucose, 16-32 g of dextrose, oral, PRN **OR** dextrose, 15-30 g of dextrose, oral, PRN **OR** glucagon, 1 mg, intramuscular, PRN **OR** dextrose 50 % in water (D50), 25 mL, intravenous, PRN  •  latanoprost, 1 drop, Both Eyes, Nightly  •  lisinopriL, 40 mg, oral, Daily  •  metoprolol succinate XL, 100 mg,  "oral, Daily  •  rosuvastatin, 10 mg, oral, Nightly  •  sodium chloride 0.9 %, , intravenous, Continuous    Review of Systems:  Constitutional: negative for fevers, chills, and weight loss  Eyes: negative for visual disturbances  Ears, nose, and throat: negative for ear drainage, hearing loss, nasal congestion and sore throat  Respiratory: negative for cough and shortness of breath  Cardiovascular: negative for chest pain, palpitations and syncope  Gastrointestinal: negative for abdominal pain, constipation, nausea and vomiting  Genitourinary:negative  Integument/breast: negative for pruritus and rash  Hematologic/lymphatic: negative for bleeding and easy bruising  Musculoskeletal: negative for arthralgias and myalgias  Neurological: negative for headaches and weakness  Behavioral/Psych: negative for anxiety and fatigue  Allergic/Immunologic: negative for urticaria      Objective     Physicial Exam  Visit Vitals  /80 (BP Location: Right upper arm, Patient Position: Lying)   Pulse (!) 104   Temp 36.3 °C (97.3 °F) (Oral)   Resp 16   Ht 1.778 m (5' 10\")   Wt 102 kg (225 lb 2.6 oz)   SpO2 98%   BMI 32.31 kg/m²     General appearance: alert, cooperative, no acute distress  Lungs: Unlabored respirations, symmetric chest expansion  Heart: regular rate and rhythm  Abdomen: soft, non-tender to palpation, non-distended  Genitalia: penis: no lesions or discharge. Testes: no masses or tenderness. No hernias.  Rectal: deferred  Extremities: extremities normal, warm and well-perfused; no cyanosis, clubbing, or edema and no redness or tenderness in the calves bilaterally  Skin: no rashes or ulcers  Lymph nodes: no inguinal adenopathy  Neurologic: Alert and oriented X 3     Labs  BMP Results       07/30/23 07/29/23 07/21/22     0607 0929 0733     136 139    K 4.1 3.9 4.5    Cl 108 104 104    CO2 22 24 27    Glucose 91 176 111    BUN 18 25 26    Creatinine 0.9 1.2 1.1    Calcium 8.0 8.7 9.0    Anion Gap 9 8 8    EGFR " >60.0 59.0 >60.0         Comment for K at 0929 on 07/29/23: Results obtained on plasma. Plasma Potassium values may be up to 0.4 mEQ/L less than serum values. The differences may be greater for patients with high platelet or white cell counts.        CBC Results       07/30/23 07/29/23 11/09/18     0607 0929 0838    WBC 5.14 5.47 6.43    RBC       HGB 11.8 13.1 14.2    HCT       MCV       MCH       MCHC        132 172         Comment for WBC at 0838 on 11/09/18: ALL RESULTS HAVE BEEN CHECKED    Comment for RBC at 0607 on 07/30/23: Unable to report due to suspected COLD AGGLUTININ    Comment for RBC at 0929 on 07/29/23: Unable to report due to suspected COLD AGGLUTININ    Comment for RBC at 0838 on 11/09/18: Unable to report due to supected COLD AGGLUTININ    Comment for HCT at 0607 on 07/30/23: Unable to report due to suspected COLD AGGLUTININ    Comment for HCT at 0929 on 07/29/23: Unable to report due to suspected COLD AGGLUTININ    Comment for HCT at 0838 on 11/09/18: Unable to report due to suspected COLD AGGLUTININ    Comment for MCV at 0607 on 07/30/23: Unable to report due to suspected COLD AGGLUTININ    Comment for MCV at 0929 on 07/29/23: Unable to report due to suspected COLD AGGLUTININ    Comment for MCV at 0838 on 11/09/18: Unable to report due to suspected COLD AGGLUTININ    Comment for MCH at 0607 on 07/30/23: Unable to report due to suspected COLD AGGLUTININ    Comment for MCH at 0929 on 07/29/23: Unable to report due to suspected COLD AGGLUTININ    Comment for MCH at 0838 on 11/09/18: Unable to report due to supected COLD AGGLUTININ    Comment for MCHC at 0607 on 07/30/23: Unable to report due to suspected COLD AGGLUTININ    Comment for MCHC at 0929 on 07/29/23: Unable to report due to suspected COLD AGGLUTININ    Comment for MCHC at 0838 on 11/09/18: Unable to report due to supected COLD AGGLUTININ        UA Results       07/29/23     1232    Color Yellow    Clarity Clear    Glucose Negative     Bilirubin Negative    Ketones Negative    Sp Grav 1.021    Blood +3    Ph 6.0    Protein +1    Urobilinogen 0.2    Nitrite Negative    Leuk Est Negative    WBC 0 TO 3    RBC Too Numerous To Count    Bacteria None Seen         Comment for Blood at 1232 on 07/29/23: The sensitivity of the occult blood test is equivalent to approximately 4 intact RBC/HPF.    Comment for Leuk Est at 1232 on 07/29/23: Results can be falsely negative due to high specific gravity, some antibiotics, glucose >3 g/dl, or WBC other than neutrophils.        Microbiology Results     ** No results found for the last 720 hours. **          Imaging  I have reviewed the Imaging from the last 24 hrs.      Assessment   77-year-old male with significant past medical history consulted to urology for microscopic  hematuria and incidentally found 1.8 cm left lower pole renal mass.    Plan   - If further categorization of renal mass is warranted, recommend MRI renal mass protocol but this is not necessary at the current time as under 2 cm of small renal masses can be observed  - We will require completion of work-up for gross hematuria with outpatient cystoscopy for his high risk microscopic hematuria as well as ongoing discussion regarding his small renal lesion  - Rest of care per primary team    Saulo Welsh,   [facility] Urology, PGY-2  [facility] #[id number]  [place] Pager #[phone]

## 2023-07-30 NOTE — PLAN OF CARE
Care Coordination Admission Assessment Note    General Information:  Readmission Within the last 30 days: no previous admission in last 30 days  Does patient have a : No  Patient-Specific Goals (include timeframe):      Living Arrangements:  Arrived From: home  Current Living Arrangements: home  People in Home: spouse  Home Accessibility: stairs to enter home (Group)  Living Arrangement Comments: 2SH, 2ste, FFSU    Housing Stability and Financial Resources (SDOH):  In the last 12 months, was there a time when you were not able to pay the mortgage or rent on time?: No  In the last 12 months, how many places have you lived?: 1  In the last 12 months, was there a time when you did not have a steady place to sleep or slept in a shelter (including now)?: No  How hard is it for you to pay for the very basics like food, housing, medical care, and heating?:      Functional Status Prior to Admission:   Assistive Device/Animal Currently Used at Home: none  Functional Status Comments: IND PTA  IADL Comments: IND PTA     Supports and Services:  Current Outpatient/Agency/Support Group: none  Type of Current Home Care Services:    History of home care episode or rehab stay: [facility]  (unsure), and [facility]    Discharge Needs Assessment:   Concerns to be Addressed: denies needs/concerns at this time, no discharge needs identified  Current Discharge Risk:    Anticipated Changes Related to Illness: none    Patient/Family Anticipated Discharge Plan:  Patient/Family Anticipates Transition To: home with family  Patient/Family Anticipated Services at Transition: none    Connection to Community  Not applicable      Patient Choice:   Offered/Gave Vendor List:    Patient's Choice of Community Agency(s):         Anticipated Discharge Plan:  Met with patient. Provided education and contact information for Care Coordination services.: yes  Anticipated Discharge Disposition: home without assistance or services     Transportation  Needs (SDOH):  Transportation Concerns: none  Transportation Anticipated: family or friend will provide  Is Out of Hospital DNR needed at discharge?: no    In the past 12 months, has lack of transportation kept you from medical appointments or from getting medications?: No  In the past 12 months, has lack of transportation kept you from meetings, work, or from getting things needed for daily living?: No    Concerns - comments: Denies needs at this time     SW met with pt at bedside to introduce self and role. Pt confirms demographics as listed above. Pt denies needs at this time. IMM done. SW to follow.     Dispo:  Home

## 2023-07-30 NOTE — ASSESSMENT & PLAN NOTE
Presented to ED with hypotension as outpatient  CT abdomen pelvis with findings of concern for mildly dilated loops of small bowel nonspecific without a clear transition point which could represent a mild ileus or low-grade partial small bowel obstruction.      -Having BMs, tolerating advanced diet, abdomen is benign   -Appreciate surgery consult, no intervention

## 2023-07-30 NOTE — PLAN OF CARE
Problem: Adult Inpatient Plan of Care  Goal: Plan of Care Review  Outcome: Progressing  Flowsheets (Taken 7/30/2023 1864)  Progress: improving  Outcome Evaluation: Pt received sitting up in bed. Denies any pain, n/v, cp, sob. Voiding yellow urine in urinal, no blood noted. IVF inufusing. Went for MRI of abdomen. Took shower when returned to floor. OOB ind. Wife at bedside very anxious. Asked md if someone would be by discuss mri results, awaiting response. VSS on ra through shift. Cont'd on clear liquid diet. Abd soft nontender, + BS.  Plan of Care Reviewed With: patient

## 2023-07-30 NOTE — CONSULTS
Thoracic Surgery Consult Note    Subjective      Tobias Little is a 77 y.o. male who was admitted for Microscopic hematuria    Generalized weakness    Elevated liver enzymes    Pulmonary nodule    Interstitial pulmonary fibrosis (CMS/HCC)    Liver lesion    Kidney mass.     He was recently treated and released from [place] ED for possible pneumonia.  He had originally had fevers and weakness and urinalysis showed hematuria so he was directed to the ED.  They felt work-up that infection was possibly due to pneumonia.  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  A minor fall in the grass and been exposed to aerosolized particles and possible mold.  He has been doing okay on his regimen of Augmentin and Tylenol.  On follow-up conversation with his primary care he was told to switch to Aleve and discontinued Tylenol.  He was taking Aleve every 4 hours instead of 6 to 8 hours.  He did not have any GI upset, anorexia, abdominal pain, nausea or vomiting.  This morning, he woke up feeling much better overall and ate eggs and toast for breakfast.  He and his wife took his temperature and blood pressure and noted that these were both low so they thought he should present to the emergency room.    He has had longstanding follow-up in the past with pulmonology due to interstitial lung disease.  He had stable findings and has good exercise tolerance and was released for as needed follow-up.  He has since had mild cough and dyspnea on exertion.  He has known elevated right hemidiaphragm.  Prior to recent illness, he has not had increasing dyspnea, cough.  He has had no hemoptysis.      Review of Systems:  As above.  He states that he feels well currently.  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)    • Hyperlipidemia    • Hypertension    • Obesity        Surgical History:   Past Surgical History:   Procedure Laterality Date   • ABLATION OF DYSRHYTHMIC FOCUS  05/2019   • APPENDECTOMY     • HERNIA REPAIR  07/2021    inguinal hernia's   (X2)   • KNEE SURGERY Right 07/2019    infection   • REPLACEMENT TOTAL KNEE Right 02/2019   • SUBDURAL HEMATOMA EVACUATION VIA CRANIOTOMY      Neurosurgery for subdural hematoma        Social History:   Social History     Socioeconomic History   • Marital status:    Tobacco Use   • Smoking status: Former   • Smokeless tobacco: Former   • Tobacco comments:     Pt Quit Smoking over 40 years ago    Vaping Use   • Vaping Use: Never used   Substance and Sexual Activity   • Alcohol use: Yes     Comment: Occassionally    • Drug use: No   • Sexual activity: Defer     Social Determinants of Health     Food Insecurity: No Food Insecurity (7/29/2023)    Hunger Vital Sign    • Worried About Running Out of Food in the Last Year: Never true    • Ran Out of Food in the Last Year: Never true       Family History:   Family History   Problem Relation Age of Onset   • Heart disease Biological Mother    • Lung cancer Biological Father        Allergies:   Allergies   Allergen Reactions   • No Known Allergies        Home Medications:  Not in a hospital admission.    Current Medications:  •  amLODIPine  •  amoxicillin  •  apixaban  •  cholecalciferol, vitamin D3, (VITAMIN D3 ORAL)  •  coenzyme Q10  •  latanoprost  •  lisinopriL  •  metoprolol succinate XL  •  rosuvastatin  •  vitamin B complex (B COMPLEX ORAL)  •  ZINC ORAL    Objective     Physical Exam:  Visit Vitals  /75   Pulse 100   Temp 36.1 °C (97 °F) (Tympanic)   Resp 15   Ht 1.778 m (5' 10\")   Wt 100 kg (221 lb)   SpO2 98%   BMI 31.71 kg/m²       General Appearance:    Alert, cooperative, no distress, appears stated age   Head:    Normocephalic, without obvious " abnormality, atraumatic   Eyes:    PERRL, conjunctiva/corneas clear, EOM's intact, fundi     benign, both eyes        Neck:   Supple, symmetrical, trachea midline, no adenopathy;        thyroid:  No enlargement/tenderness/nodules; no carotid    bruit or JVD   Back:     Symmetric, no curvature, ROM normal, no CVA tenderness   Lungs:    Good lung volumes with occasional crackles.   Chest wall:    No tenderness or deformity   Heart:    Regular rate and rhythm   Abdomen:     Soft, non-tender, bowel sounds active all four quadrants,     no masses, no organomegaly   Extremities:  Musculoskeletal:   Extremities normal, atraumatic, no cyanosis or edema    No injury or deformity   Pulses:   2+ and symmetric all extremities   Skin:   Skin color, texture, turgor normal, no rashes or lesions   Lymph nodes:   Cervical, supraclavicular, and axillary nodes normal   Neurologic:    Behavior/  Emotional:  Grossly intact.  Appropriate, cooperative       Last documented Vital Signs:  Vitals    07/29/23 1930 07/29/23 2028 07/29/23 2200 07/29/23 2230   BP: 135/80 117/71 130/89 136/75   Pulse: 94 88 98 100   Resp: (!) 22 20 20 15   Temp:       SpO2: 99% 98% 98% 98%       Labs:  CBC Results       07/29/23 11/09/18 06/15/18     0929 0838 1500    WBC 5.47 6.43 7.49    RBC       HGB 13.1 14.2 14.7    HCT       MCV       MCH       MCHC        172 164         Comment for WBC at 0838 on 11/09/18: ALL RESULTS HAVE BEEN CHECKED    Comment for RBC at 0929 on 07/29/23: Unable to report due to suspected COLD AGGLUTININ    Comment for RBC at 0838 on 11/09/18: Unable to report due to supected COLD AGGLUTININ    Comment for RBC at 1500 on 06/15/18: Unable to report due to supected COLD AGGLUTININ    Comment for HCT at 0929 on 07/29/23: Unable to report due to suspected COLD AGGLUTININ    Comment for HCT at 0838 on 11/09/18: Unable to report due to suspected COLD AGGLUTININ    Comment for HCT at 1500 on 06/15/18: Unable to report due to suspected  COLD AGGLUTININ    Comment for MCV at 0929 on 07/29/23: Unable to report due to suspected COLD AGGLUTININ    Comment for MCV at 0838 on 11/09/18: Unable to report due to suspected COLD AGGLUTININ    Comment for MCV at 1500 on 06/15/18: Unable to report due to suspected COLD AGGLUTININ    Comment for MCH at 0929 on 07/29/23: Unable to report due to suspected COLD AGGLUTININ    Comment for MCH at 0838 on 11/09/18: Unable to report due to supected COLD AGGLUTININ    Comment for MCH at 1500 on 06/15/18: Unable to report due to supected COLD AGGLUTININ    Comment for MCHC at 0929 on 07/29/23: Unable to report due to suspected COLD AGGLUTININ    Comment for MCHC at 0838 on 11/09/18: Unable to report due to supected COLD AGGLUTININ    Comment for MCHC at 1500 on 06/15/18: Unable to report due to supected COLD AGGLUTININ      BMP Results       07/29/23 07/21/22 12/29/21     0929 0733 0930     139 142    K 3.9 4.5 4.5    Cl 104 104 100    CO2 24 27 29    Glucose 176 111 87    BUN 25 26 14    Creatinine 1.2 1.1 1.1    Calcium 8.7 9.0 9.6    Anion Gap 8 8 13    EGFR 59.0 >60.0 >60.0         Comment for K at 0929 on 07/29/23: Results obtained on plasma. Plasma Potassium values may be up to 0.4 mEQ/L less than serum values. The differences may be greater for patients with high platelet or white cell counts.      PT/PTT Results       06/11/18     0901    PTT 25      Troponin I Results       07/29/23 07/29/23     1133 0929    HS Troponin I 7.2 8.6      UA Results       07/29/23     1232    Color Yellow    Clarity Clear    Glucose Negative    Bilirubin Negative    Ketones Negative    Sp Grav 1.021    Blood +3    Ph 6.0    Protein +1    Urobilinogen 0.2    Nitrite Negative    Leuk Est Negative    WBC 0 TO 3    RBC Too Numerous To Count    Bacteria None Seen         Comment for Blood at 1232 on 07/29/23: The sensitivity of the occult blood test is equivalent to approximately 4 intact RBC/HPF.    Comment for Leuk Est at 1232 on  07/29/23: Results can be falsely negative due to high specific gravity, some antibiotics, glucose >3 g/dl, or WBC other than neutrophils.          He has slightly elevated liver enzymes and these are slightly increased from his recent GLO visit.  No leukocytosis.  Hematuria noted    Imaging:  I have reviewed the Imaging from the last 24 hrs.    The small left lower lobe nodule/process is of indeterminate significance especially in the setting of significant findings of interstitial changes.  There is slightly dilated bowel with no evidence of obstruction. there is an 18 mm lesion on the right kidney which appears to be new when compared to 2004 imaging.  The right kidney also has a 3 mm stone    Assessment     77 y.o. male being consulted for review and recommendations regarding abdominal findings of possible mild ileus versus low-grade partial SBO       Plan     He has no abdominal complaints or leukocytosis.  He has been n.p.o. since breakfast and can start clears.  Unlikely to have need for further interventions.  We will follow at a distance while other findings on being evaluated.  We discussed the lung nodule.  He is able to follow-up with his PCP in 6 months but he would prefer to follow-up with thoracic surgery based on our conversation today.  We will assist in setting up 6-month follow-up scan and visit per his request  I performed a history and physical examination of the patient and discussed the management with the Resident. I reviewed the Resident's note and agree with the documented findings and plan of care, except for my comments below or within the additional notes today.  77-year-old male with multiple problems who presents with low blood pressure and hematuria during work-up patient has CT scan that showed questionable early small bowel obstruction versus ileus.  Patient denies any abdominal pain.  Denies any nausea.  He states his bowels been functioning normally.  On exam his abdomen is soft  nondistended nontender.  Patient can be on a regular diet no surgical intervention necessary at this time please call with any further questions.  Rai Davis MD

## 2023-07-30 NOTE — H&P
"   Hospital Medicine Service -  History & Physical        CHIEF COMPLAINT   \"Low Blood Pressure\"     HISTORY OF PRESENT ILLNESS      Tobias Little is a 77 y.o. male with a past medical history of atrial fibrillation, interstitial lung disease, CAD, hyperlipidemia, hypertension, cold agglutinins presents with hypotension neurolyse weakness.  Patient reports that at 7 AM this morning he was feeling clammy and his blood pressure was checked at that time with it being low at 71/58 associated with tachycardia with a heart rate of 108.  Patient reports that he recently was diagnosed with pneumonia at [facility] and is currently on Augmentin and and azithromycin that was started yesterday.  Patient reports having a dry cough without any phlegm production.  Denies any fevers currently but states that earlier this week he was having fevers up to 103 °F.  No associated myalgias or arthralgias.  No associated lightheadedness, dizziness, chest pain, chest pressure, palpitations, shortness of breath, pain with deep respirations, abdominal pain or dysuria.  He reports having urine today that was darker in color but denies any hematuria.  He reports having leg swelling at baseline which has not changed.  Patient reports that he passed a small bowel movement while here in the ER and had a small bowel movement this morning.  States that he has had an increase in belching and has been passing flatus.  Denies abdominal pain.  Denies any recent sick contacts or travel.    In the ER patient received a CT of the chest and abdomen/pelvis which shows multiple findings including a new left lower lobe nodule measuring up to 7 mm, nonspecific heterogeneous enhancement of the liver, new small partially enhancing lesion in the right hepatic lobe, and an indeterminate mass in the left kidney lower pole.  Patient was also found to have incidentally concern for moderately dilated loops of small bowel are nonspecific without a clear " transition point which could represent a mild ileus or low-grade partial small bowel obstruction.  Patient was evaluated by surgery who recommended admission to the medical service.    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 Authorizing Provider   amLODIPine (NORVASC) 5 mg tablet Take 5 mg by mouth daily.    Jacklyn Pablo MD   amoxicillin (AMOXIL) 500 mg capsule TAKE 4 CAPSULES BY MOUTH 1 HOUR PRIOR TO APPOINTMENT 7/7/22   Jacklyn Pablo MD   apixaban (ELIQUIS) 5 mg tablet Take 5 mg by mouth 2 (two) times a day.    Jacklyn Pablo MD   cholecalciferol, vitamin D3, (VITAMIN D3 ORAL) Take 5,000 IU by mouth daily. 3/8/16   Jacklyn Pablo MD   coenzyme Q10 (COQ10) 200 mg capsule Take 200 mg by mouth daily.      Manuel Nichole MD   latanoprost (XALATAN) 0.005 % ophthalmic solution Administer 1 drop into affected eye(s) nightly. Into left eye 4/2/18   Jacklyn Pablo MD   lisinopriL 40 mg tablet Take 40 mg by mouth daily.    Jacklyn Pablo MD   metoprolol succinate XL (TOPROL-XL) 100 mg 24 hr tablet Take 100 mg by mouth daily.  5/6/18   Jacklyn Pablo MD   rosuvastatin (CRESTOR) 10 mg tablet Take  1 tablet (10 mg total) by mouth once daily. 4/18/22   Manuel Nichole MD   vitamin B complex (B COMPLEX ORAL) Take by mouth daily.    Provider, MD Jacklyn   ZINC ORAL Take by mouth daily.    Provider, MD Jacklyn       ALLERGIES      No known allergies    FAMILY HISTORY      Family History   Problem Relation Age of Onset   • Heart disease Biological Mother    • Lung cancer Biological Father        SOCIAL HISTORY      Social History     Socioeconomic History   • Marital status:    Tobacco Use   • Smoking status: Former   • Smokeless tobacco: Former   • Tobacco comments:     Pt Quit Smoking over 40 years ago    Vaping Use   • Vaping Use: Never used   Substance and Sexual Activity   • Alcohol use: Yes     Comment: Occassionally    • Drug use: No   • Sexual activity: Defer     Social Determinants of Health     Food Insecurity: No Food Insecurity (7/29/2023)    Hunger Vital Sign    • Worried About Running Out of Food in the Last Year: Never true    • Ran Out of Food in the Last Year: Never true       REVIEW OF SYSTEMS      All other systems reviewed and negative except as noted in HPI    PHYSICAL EXAMINATION      Temp:  [35.7 °C (96.2 °F)-36.1 °C (97 °F)] 36.1 °C (97 °F)  Heart Rate:  [88-96] 88  Resp:  [18-22] 20  BP: ()/(57-83) 117/71  Body mass index is 31.71 kg/m².    Physical Exam  Vitals reviewed.   Constitutional:       General: He is not in acute distress.     Appearance: He is obese. He is not ill-appearing, toxic-appearing or diaphoretic.   HENT:      Head: Normocephalic and atraumatic.      Right Ear: External ear normal. There is no impacted cerumen.      Left Ear: External ear normal. There is no impacted cerumen.      Nose: Nose normal. No congestion or rhinorrhea.      Mouth/Throat:      Mouth: Mucous membranes are moist.      Pharynx: Oropharynx is clear. No oropharyngeal exudate or posterior oropharyngeal erythema.   Eyes:      General: Scleral icterus present.       Conjunctiva/sclera: Conjunctivae normal.      Pupils: Pupils are equal, round, and reactive to light.      Comments: Minimal scleral icterus   Cardiovascular:      Rate and Rhythm: Normal rate. Rhythm irregular.   Pulmonary:      Effort: Pulmonary effort is normal. No respiratory distress.      Breath sounds: Normal breath sounds. No wheezing, rhonchi or rales.   Chest:      Chest wall: No tenderness.   Abdominal:      General: Bowel sounds are normal. There is no distension.      Palpations: Abdomen is soft.      Tenderness: There is no abdominal tenderness. There is no guarding or rebound.   Musculoskeletal:      Cervical back: Normal range of motion and neck supple. No rigidity.      Right lower leg: No edema.      Left lower leg: No edema.   Lymphadenopathy:      Cervical: No cervical adenopathy.   Skin:     Comments: Mild jaundice   Neurological:      General: No focal deficit present.      Mental Status: He is alert and oriented to person, place, and time. Mental status is at baseline.      Cranial Nerves: No cranial nerve deficit.      Motor: No weakness.      Coordination: Coordination normal.   Psychiatric:         Mood and Affect: Mood normal.         Behavior: Behavior normal.         LABS / IMAGING / EKG        Labs  I have reviewed the patient's pertinent labs.  Significant abnormals are Glucose 176, ALT 61, AST 50, total bilirubin 1.8, hemoglobin 13.1, platelets 132.  Lab Results   Component Value Date    GLUCOSE 176 (H) 07/29/2023    CALCIUM 8.7 07/29/2023     07/29/2023    K 3.9 07/29/2023    CO2 24 07/29/2023     07/29/2023    BUN 25 07/29/2023    CREATININE 1.2 07/29/2023     Lab Results   Component Value Date    ALT 61 (H) 07/29/2023    AST 50 (H) 07/29/2023    ALKPHOS 74 07/29/2023    BILITOT 1.8 (H) 07/29/2023     Lab Results   Component Value Date    WBC 5.47 07/29/2023    HGB 13.1 (L) 07/29/2023    HCT  07/29/2023      Comment:      Unable to report due to suspected COLD AGGLUTININ     MCV  07/29/2023      Comment:      Unable to report due to suspected COLD AGGLUTININ     (L) 07/29/2023     Troponin I Results       07/29/23 07/29/23     [id number]    HS Troponin I 7.2 8.6          Imaging  I have independently reviewed the pertinent imaging from the last 24 hrs.  CTA chest  1.  Interstitial fibrosis possibly with mild paraseptal emphysematous change as  discussed below.  2.  New left lower lobe nodule measuring 7 mm. Follow-up recommended 6 months.  3.  No evidence of pulmonary embolism.  4.  Additional findings discussed below.     CT Abdomen/ Pelvis with IV Contrast  1.  Nonspecific heterogeneous enhancement of the liver.  2.  New small partially enhancing lesion in the right hepatic lobe probably a  small vascular shunt lesion with 2 small to characterize hypodensity in the left  hepatic lobe. Indeterminant mass in the left kidney lower pole. These can be  correlated with follow-up MRI.  3.  Elevated right hemidiaphragm. This is slightly increased since 2011.  4.  Mildly dilated loops of small bowel nonspecific without a clear transition  point could represent a mild ileus or low-grade partial small bowel obstruction.  5.  No evidence of additional acute inflammatory obstructive process in abdomen  or pelvis.    Chest Xray  No significant change from prior. Coarse subpleural reticulation and areas of  patchy coarse interstitial opacity likely related to interstitial fibrotic  changes as seen on prior CT. No pleural effusion or pneumothorax.    Ultrasound venous leg bilateral  No acute DVT    ECG/Telemetry  I have independently reviewed the ECG. Significant findings include Atrial flutter with PVCs, rate controlled.    ASSESSMENT AND PLAN           * Partial small bowel obstruction (CMS/HCC)  Assessment & Plan  Assessment: CT abdomen pelvis with findings of concern for mildly dilated loops of small bowel nonspecific without a clear transition point which could represent a mild ileus or  low-grade partial small bowel obstruction.  Patient had a small bowel movement while in the ER.  Passing flatus and belching.  Suspect that the patient likely has a mild ileus.    Plan:  We will admit and monitor patient closely.  Clear liquid diet as tolerated.  Serial abdominal exams.  General surgery is consulted and following.      Abnormal computed tomography of abdomen and pelvis  Assessment & Plan  Assessment: CT abdomen pelvis with multiple incidental findings including a small partially enhancing lesion in the right hepatic lobe and an indeterminate mass in the left kidney lower pole.    Plan:  Will obtain MRI of the abdomen/pelvis for further assessment.  Urology consult for recommendations regarding renal mass.  Suspect that microscopic hematuria may be related to the renal mass.      Lung nodule seen on imaging study  Assessment & Plan  Assessment: CT chest PE study with finding of new left lower lobe nodule measuring up to 7 mm.    Plan:  Follow-up CT chest in 6 months.  Outpatient follow-up with pulmonology for further recommendations.    Elevated LFTs  Assessment & Plan  Assessment: Mild elevation of ALT at 61, AST at 50 and total bilirubin 1.8.  CT abdomen pelvis with findings of new partially enhancing lesion in the right hepatic lobe probably a small vascular shunt lesion with too small to characterize hypodensity in the left hepatic lobe.  Suspect that this may be contributing to the abnormal LFTs.  Recently patient had his statin stopped due to the elevated LFTs and has been limiting Tylenol intake.    Plan:  Continue to monitor LFTs closely.  Obtain MRI of the abdomen/pelvis to assess further.    History of atrial fibrillation  Assessment & Plan  Assessment: Currently in atrial flutter with PVCs.  Maintained on Toprol- mg daily for rate control and Eliquis 5 mg twice daily.    Plan:  Monitor closely on telemetry.  Continue Toprol- mg daily.  Continue Eliquis 5 mg twice daily with  close monitoring of hemoglobin.    Essential hypertension  Assessment & Plan  Assessment:  Patient reports an episode of hypotension with systolic blood pressure in the 70s this morning.  Current blood pressures are significantly improved.    Plan:  Monitor blood pressures closely.  Continue antihypertensive medications with holding parameters.  This includes amlodipine 5 mg daily, lisinopril 40 mg daily and Toprol- mg daily.  Check orthostatic vitals every shift.         VTE Assessment: [place] VTE Score: 6  VTE Prophylaxis: Current anticoagulants:  apixaban (ELIQUIS) tablet 5 mg, oral, BID      Code Status: Full Code  Palliative Care Screening Score: 1   Estimated Discharge Date: 8/1/2023  Disposition Planning: Anticipate discharge to home when patient is medically stable.     Airam Azul MD  7/29/2023

## 2023-07-31 ENCOUNTER — PATIENT OUTREACH (OUTPATIENT)
Dept: HEMATOLOGY/ONCOLOGY | Facility: HOSPITAL | Age: 78
End: 2023-07-31
Payer: COMMERCIAL

## 2023-07-31 VITALS
TEMPERATURE: 97.5 F | HEART RATE: 86 BPM | HEIGHT: 70 IN | BODY MASS INDEX: 32.23 KG/M2 | DIASTOLIC BLOOD PRESSURE: 83 MMHG | OXYGEN SATURATION: 98 % | WEIGHT: 225.16 LBS | SYSTOLIC BLOOD PRESSURE: 131 MMHG | RESPIRATION RATE: 16 BRPM

## 2023-07-31 PROBLEM — N28.1 RENAL CYST: Status: ACTIVE | Noted: 2023-07-30

## 2023-07-31 PROBLEM — J18.9 PNEUMONIA: Status: ACTIVE | Noted: 2023-07-31

## 2023-07-31 PROCEDURE — 99239 HOSP IP/OBS DSCHRG MGMT >30: CPT | Performed by: STUDENT IN AN ORGANIZED HEALTH CARE EDUCATION/TRAINING PROGRAM

## 2023-07-31 PROCEDURE — 63700000 HC SELF-ADMINISTRABLE DRUG: Performed by: HOSPITALIST

## 2023-07-31 RX ORDER — AMOXICILLIN AND CLAVULANATE POTASSIUM 875; 125 MG/1; MG/1
1 TABLET, FILM COATED ORAL 2 TIMES DAILY WITH MEALS
Qty: 3 TABLET | Refills: 0 | Status: SHIPPED | OUTPATIENT
Start: 2023-07-31 | End: 2023-08-02

## 2023-07-31 RX ADMIN — LISINOPRIL 40 MG: 40 TABLET ORAL at 08:46

## 2023-07-31 RX ADMIN — METOPROLOL SUCCINATE 100 MG: 100 TABLET, EXTENDED RELEASE ORAL at 08:46

## 2023-07-31 RX ADMIN — AMLODIPINE BESYLATE 5 MG: 5 TABLET ORAL at 08:46

## 2023-07-31 RX ADMIN — APIXABAN 5 MG: 5 TABLET, FILM COATED ORAL at 08:46

## 2023-07-31 RX ADMIN — Medication 5000 UNITS: at 08:50

## 2023-07-31 RX ADMIN — AMOXICILLIN AND CLAVULANATE POTASSIUM 1 TABLET: 875; 125 TABLET, FILM COATED ORAL at 08:45

## 2023-07-31 ASSESSMENT — COGNITIVE AND FUNCTIONAL STATUS - GENERAL
STANDING UP FROM CHAIR USING ARMS: 4 - NONE
MOVING TO AND FROM BED TO CHAIR: 4 - NONE
WALKING IN HOSPITAL ROOM: 4 - NONE
CLIMB 3 TO 5 STEPS WITH RAILING: 4 - NONE

## 2023-07-31 NOTE — ASSESSMENT & PLAN NOTE
Recently was diagnosed with pneumonia at [facility] and was Augmentin and and azithromycin that was started 7/28    -Continue to complete 5 day course Augmentin, azithro complete

## 2023-07-31 NOTE — LETTER
[facility]    [address]  [place]  [address]  Phone [phone]      2023    Dakotah Villalba MD  [address]    [place] [id number]      Re:  Tobias Little  : 1945    Dear Dr. Villalba:    Your patient Tobias Little was referred to [facility] after CTA Chest done on 2023 had incidental finding of new 7 mm left lower lobe nodule and stable to 2019 right lower lobe nodule.  Follow up CT Chest at 6-12 months is recommended per Fleischner Society guidelines.      While he was seen at [facility] when the CT was done, we understand that Tobias Little is your patient and wanted to make sure that you were aware and would follow him.      Please feel free to contact the nurse navigator at [phone] with any questions.    Sincerely,         Amanda Del Rio, FRACNOISN, RN, OCN  Program Navigator    CC: Tobias Little

## 2023-07-31 NOTE — ASSESSMENT & PLAN NOTE
Microscopic hematuria  MRI confirming indeterminate lesion to be renal cyst  Also with apparent history cold agglutinin     -Hgb stable  -Will follow up as outpatient with Urology, Dr. Culp for complete work up

## 2023-07-31 NOTE — PROGRESS NOTES
Urology Daily Progress Note    Patient seen and evaluated, agree with below.  Patient is voiding without difficulty, denies any flank pain    Abdomen: Soft  MRI of the abdomen: Left lower pole proteinaceous cyst    77-year-old male with multimedical issues with a proteinaceous left lower pole cyst and microscopic hematuria.  Patient is stable from a urologic standpoint.  He will require a cystoscopy, this can be as an outpatient    Subjective     Interval History: No acute events overnight.  Pain is well controlled.  He denies any fever/chills, no nausea/vomiting.  Voiding without difficulty, no dysuria/hematuria.      Objective     Vital signs in last 24 hours:  Temp:  [36.3 °C (97.3 °F)-36.6 °C (97.8 °F)] 36.4 °C (97.5 °F)  Heart Rate:  [] 88  Resp:  [16] 16  BP: (124-158)/(80-87) 158/87      Intake/Output Summary (Last 24 hours) at 7/31/2023 0730  Last data filed at 7/30/2023 1300  Gross per 24 hour   Intake 1258 ml   Output --   Net 1258 ml     Intake/Output this shift:  No intake/output data recorded.    Labs  BMP Results         07/30/23 07/29/23 07/21/22     0607 0929 0733     136 139    K 4.1 3.9 4.5    Cl 108 104 104    CO2 22 24 27    Glucose 91 176 111    BUN 18 25 26    Creatinine 0.9 1.2 1.1    Calcium 8.0 8.7 9.0    Anion Gap 9 8 8    EGFR >60.0 59.0 >60.0           Comment for K at 0929 on 07/29/23: Results obtained on plasma. Plasma Potassium values may be up to 0.4 mEQ/L less than serum values. The differences may be greater for patients with high platelet or white cell counts.          CBC Results         07/30/23 07/29/23 11/09/18     0607 0929 0838    WBC 5.14 5.47 6.43    RBC       HGB 11.8 13.1 14.2    HCT       MCV       MCH       MCHC        132 172           Comment for WBC at 0838 on 11/09/18: ALL RESULTS HAVE BEEN CHECKED    Comment for RBC at 0607 on 07/30/23: Unable to report due to suspected COLD AGGLUTININ    Comment for RBC at 0929 on 07/29/23: Unable to report due  to suspected COLD AGGLUTININ    Comment for RBC at 0838 on 11/09/18: Unable to report due to supected COLD AGGLUTININ    Comment for HCT at 0607 on 07/30/23: Unable to report due to suspected COLD AGGLUTININ    Comment for HCT at 0929 on 07/29/23: Unable to report due to suspected COLD AGGLUTININ    Comment for HCT at 0838 on 11/09/18: Unable to report due to suspected COLD AGGLUTININ    Comment for MCV at 0607 on 07/30/23: Unable to report due to suspected COLD AGGLUTININ    Comment for MCV at 0929 on 07/29/23: Unable to report due to suspected COLD AGGLUTININ    Comment for MCV at 0838 on 11/09/18: Unable to report due to suspected COLD AGGLUTININ    Comment for MCH at 0607 on 07/30/23: Unable to report due to suspected COLD AGGLUTININ    Comment for MCH at 0929 on 07/29/23: Unable to report due to suspected COLD AGGLUTININ    Comment for MCH at 0838 on 11/09/18: Unable to report due to supected COLD AGGLUTININ    Comment for MCHC at 0607 on 07/30/23: Unable to report due to suspected COLD AGGLUTININ    Comment for MCHC at 0929 on 07/29/23: Unable to report due to suspected COLD AGGLUTININ    Comment for MCHC at 0838 on 11/09/18: Unable to report due to supected COLD AGGLUTININ          UA Results         07/29/23     1232    Color Yellow    Clarity Clear    Glucose Negative    Bilirubin Negative    Ketones Negative    Sp Grav 1.021    Blood +3    Ph 6.0    Protein +1    Urobilinogen 0.2    Nitrite Negative    Leuk Est Negative    WBC 0 TO 3    RBC Too Numerous To Count    Bacteria None Seen           Comment for Blood at 1232 on 07/29/23: The sensitivity of the occult blood test is equivalent to approximately 4 intact RBC/HPF.    Comment for Leuk Est at 1232 on 07/29/23: Results can be falsely negative due to high specific gravity, some antibiotics, glucose >3 g/dl, or WBC other than neutrophils.          Microbiology Results       Procedure Component Value Units Date/Time    Blood Culture Blood, Venous  [[id number]]  (Normal) Collected: 07/29/23 1133    Specimen: Blood, Venous Updated: 07/30/23 1701     Culture No growth at 18-24 hours    Blood Culture Blood, Venous [[id number]]  (Normal) Collected: 07/29/23 0929    Specimen: Blood, Venous Updated: 07/30/23 1401     Culture No growth at 18-24 hours            Imaging  MRI PELVIS WITH AND WITHOUT CONTRAST    Result Date: 7/30/2023  IMPRESSION: Mild hepatic steatosis with morphologic changes which can be seen in the setting of chronic compensated liver disease (fibrosis or developing cirrhosis). Subcentimeter lesion seen on CT represents a subcentimeter cyst.  No suspicious hepatic mass seen within visualized portions. Bilateral renal cysts.  Left lower pole lesion seen on CT represents a proteinaceous cyst. No bowel obstruction.  Sigmoid diverticulosis without diverticulitis.     MRI ABDOMEN WITH AND WITHOUT CONTRAST    Result Date: 7/30/2023  IMPRESSION: Mild hepatic steatosis with morphologic changes which can be seen in the setting of chronic compensated liver disease (fibrosis or developing cirrhosis). Subcentimeter lesion seen on CT represents a subcentimeter cyst.  No suspicious hepatic mass seen within visualized portions. Bilateral renal cysts.  Left lower pole lesion seen on CT represents a proteinaceous cyst. No bowel obstruction.  Sigmoid diverticulosis without diverticulitis.     CT ABDOMEN PELVIS WITH IV CONTRAST    Result Date: 7/29/2023  IMPRESSION: 1.  Nonspecific heterogeneous enhancement of the liver. 2.  New small partially enhancing lesion in the right hepatic lobe probably a small vascular shunt lesion with 2 small to characterize hypodensity in the left hepatic lobe. Indeterminant mass in the left kidney lower pole. These can be correlated with follow-up MRI. 3.  Elevated right hemidiaphragm. This is slightly increased since 2011. 4.  Mildly dilated loops of small bowel nonspecific without a clear transition point could represent a mild ileus or  low-grade partial small bowel obstruction. 5.  No evidence of additional acute inflammatory obstructive process in abdomen or pelvis. 6.  Multiple additional findings discussed below. COMMENT: Comparison: CT dated 5/26/2011 TECHNIQUE: CT abdomen pelvis from with contrast. 100 mL Omnipaque 350. CT DOSE:  One or more dose reduction techniques (e.g. automated exposure control, adjustment of the mA and/or kV according to patient size, use of iterative reconstruction technique) utilized for this examination. FINDINGS: LOWER CHEST: CT chest CT dictation. ABDOMEN: LIVER: Liver demonstrates heterogeneous enhancement is nonspecific. Elevated right hemidiaphragm is noted slightly increased compared to 2011 CT  image. No significant enlargement of the liver which measures about 16 cm coronal plane. Small arterially enhancing lesion suspected vascular shunt lesion is noted laterally in the right hepatic lobe. Tiny low-density lesion in the left hepatic lobe medial segment measuring 0.7 cm too small to characterize possibly a small cyst. These lesions are new from 2004 CT study. BILE DUCTS: normal caliber. GALLBLADDER: No calcified gallstones. Normal caliber wall. PANCREAS: Pancreatic divisum. Top normal caliber pancreatic duct. No suspicious or acute pancreatic findings. SPLEEN: within normal limits. ADRENALS: within normal limits. KIDNEYS: No hydronephrosis. Areas of scarring in the left kidney. Indeterminant small mass exophytic from the lower pole of the right kidney. This could represent a complex cyst or a solid lesion. This is new since 2004. This measures about 1.8 cm in the coronal plane. 3 mm calculus upper pole right kidney. PELVIS: REPRODUCTIVE ORGANS: Enlarged prostate incompletely assessed. URETERS: within normal limits. BLADDER: within normal limits. BOWEL: Severe colonic diverticulosis. There is no evidence of diverticulitis. Normal appearance of terminal ileum. Patient appears to be status post  appendectomy. There is a single short segment mildly dilated loop of small bowel in left upper quadrant image 58. In the ventral abdomen there is also some slight dilatation of loops of bowel. No discrete transition zone. Finds could be on the basis of ileus or potentially a low-grade small bowel obstruction as the distal small bowel is normal in caliber. Small diverticula are noted in the terminal ileum.. No commencing small bowel obstruction or significant ileus. Stomach is underdistended. No enlarged mesenteric lymph nodes. PERITONEUM: no ascites or free air, no fluid collection. VESSELS: Patency of major opacified vasculature. No abdominal aortic aneurysm. RETROPERITONEUM: within normal limits. ABDOMINAL WALL: Mild diastases rectus abdominis muscles. Mild dependent edema. BONES: There are degenerative changes present. Probable demineralization. No acute or suspicious bone findings..     CT ANGIOGRAPHY CHEST PULMONARY EMBOLISM WITH IV CONTRAST    Result Date: 7/29/2023  IMPRESSION: 1.  Interstitial fibrosis possibly with mild paraseptal emphysematous change as discussed below. 2.  New left lower lobe nodule measuring 7 mm. Follow-up recommended 6 months. 3.  No evidence of pulmonary embolism. 4.  Additional findings discussed below. Fleischner Society 2017 Guidelines for Management of Incidentally Detected Pulmonary Nodules in Adults. (Solid Nodules) Nodule size 6-8 mm (single) Low-Risk Patient - CT at 6-12 months, then consider CT at 18-24 months High-Risk Patient - CT at 6-12 months, then CT 18-24 months COMMENT: Comparison: Chest x-ray dated July 29, 2023 Technique: CTA of the chest pulmonary embolism protocol was performed with contrast.  3D MIP and/or volume rendered image reconstruction performed and reviewed. 100 mL Omnipaque 350 given. CT DOSE:  One or more dose reduction techniques (e.g. automated exposure control, adjustment of the mA and/or kV according to patient size, use of iterative reconstruction  technique) utilized for this examination. FINDINGS: LUNG, PLEURA AND LARGE AIRWAYS:Trachea and the proximal bronchi remain patent. Bilateral bronchiectasis noted. There is also subpleural reticulation noted bilaterally compatible with element of fibrosis. There is some honeycombing present in the right lower lobe posteriorly (for example thin section axial image 111. This is also present on the 2019 CT. There is also an element of peripheral cystic spaces in the upper lobes more so on the right very similar to the prior study. Is uncertain this is more upper lobe peripheral honeycombing or this represents paraseptal emphysematous change. The distribution of the peripheral dominant fibrosis and early mild honeycombing is suggestive of UIP. In the right lower lobe on thin section axial image 129 there is a 9 mm pulmonary nodule stable compared to 2019. 7 mm nodule left lower lobe image 148 appears to be new. No other suspicious nodular foci. No convincing acute consolidation. There is no pneumothorax or pleural effusion. VESSELS: Normal caliber main pulmonary artery. No evidence of pulmonary embolism. No evidence of acute thoracic aortic findings. There is an ascending thoracic aortic aneurysm measuring approximate 4.3 cm unchanged.. There is severe coronary atherosclerotic calcification incompletely assessed. Great vessel origins are patent. HEART:  Enlarged. No pericardial effusion beyond physiologic appearing fluid MEDIASTINUM AND MORELIA: Small scattered hilar and mediastinal lymph nodes none of which appear pathologic by size criteria CHEST WALL AND LOWER NECK: Loop recorder left anterior chest. UPPER ABDOMEN:Limited assessment with this technique. Refer to CT abdomen pelvis. Small calculus upper pole right kidney. Cyst left upper pole. There may also be a tiny calculus in the left upper pole. Atheromatous changes. BONES: Degenerative disease and Schmorl's nodes. Possible demineralization. No acute or suspicious  "bone findings.     US venous leg bilateral    Result Date: 7/29/2023  IMPRESSION: No evidence of acute deep venous thrombosis in the visualized venous segments of the bilateral lower extremities.     X-RAY CHEST 2 VIEWS    Result Date: 7/29/2023  IMPRESSION: No significant change from prior. Coarse subpleural reticulation and areas of patchy coarse interstitial opacity likely related to interstitial fibrotic changes as seen on prior CT. No pleural effusion or pneumothorax.          Physicial Exam  Visit Vitals  BP (!) 158/87 (BP Location: Right upper arm, Patient Position: Lying)   Pulse 88   Temp 36.4 °C (97.5 °F) (Oral)   Resp 16   Ht 1.778 m (5' 10\")   Wt 102 kg (225 lb 2.6 oz)   SpO2 98%   BMI 32.31 kg/m²     General appearance: alert, cooperative, no acute distress  Lungs: Unlabored respirations, symmetric chest expansion  Heart: regular rate and rhythm  Abdomen: soft, non-tender to palpation, non-distended, no CVA tenderness  : Normal male genitalia  Extremities: extremities normal, warm and well-perfused; no cyanosis, clubbing, or edema and no redness or tenderness in the calves bilaterally  Neurologic: Alert and oriented X 3       Assessment     77-year-old male history of A-fib, CAD, HLD, HTN admitted for hypertension/concerns for partial small bowel obstruction; urology was consulted for findings of microscopic hematuria along with a mass on his left kidney.        Plan     - MRI appears to note that mass is most consistent with a small hemorrhagic or proteinaceous cyst in the left lower pole at the site of question  - No acute urologic intervention at this time, patient should follow-up with Dr. Culp as an outpatient for completion of his hematuria work-up along with further recommendations regarding the findings of this cyst on his kidney    [facility]  Johnny Spencer MD PGY2  [facility] Pager #[phone]  [place] Pager #[phone]                            "

## 2023-07-31 NOTE — PROGRESS NOTES
Patient referred to Pulmonary Nodule Program when CTA Chest done on 7/29/2023 had incidental finding of new 7 mm left lower lobe nodule and stable to 6/24/2019 right lower lobe nodule.  Follow up CT Chest at 6-12 months is recommended per Fleischner Society guidelines.  Contacted PCP and patient to determine follow up needs.

## 2023-07-31 NOTE — DISCHARGE SUMMARY
"   Hospital Medicine Service -  Inpatient Discharge Summary        BRIEF OVERVIEW   Patient: Tobias Little (1945)    Admitting Provider: Airam Azul MD  Attending Provider: Cayla Wright MD Attending phys phone: [phone]    PCP: Dakotah Villalba [phone]    Admission Date: 7/29/2023  Discharge Date: 7/31/2023     DISCHARGE DIAGNOSES      Primary Discharge Diagnosis  Partial small bowel obstruction (CMS/HCC)    Secondary Discharge Diagnoses  Active Hospital Problems    Diagnosis Date Noted   • Pneumonia 07/31/2023   • Lung nodule seen on imaging study 07/30/2023   • Renal cyst 07/30/2023   • Elevated LFTs 07/30/2023   • Partial small bowel obstruction (CMS/HCC) 07/29/2023   • History of atrial fibrillation 02/21/2019   • 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       Problem List on Day of Discharge  Pneumonia  Assessment & Plan  Recently was diagnosed with pneumonia at [facility] and was Augmentin and and azithromycin that was started 7/28    -Continue to complete 5 day course Augmentin, azithro complete    Elevated LFTs  Assessment & Plan  Mild elevation of ALT at 61, AST at 50 and total bilirubin 1.8 on admission  CT abdomen pelvis with findings of new partially enhancing lesion in the right hepatic lobe  MRI abdomen showing \"mild hepatic steatosis with morphologic changes which can be seen in the setting of chronic compensated liver disease (fibrosis or developing cirrhosis). Subcentimeter lesion seen on CT represents a subcentimeter cyst.  No suspicious hepatic mass seen within visualized portions.\"  Recently patient had his statin stopped due to the elevated LFTs and has been limiting Tylenol intake    -Exam is benign, no further inpatient work up indicated   -Continue off of statin   -Follow up with PCP/GI as outpatient with repeat " "CMP    Renal cyst  Assessment & Plan  CT abdomen pelvis indeterminate mass in the left kidney lower pole  MRI abdomen showing, \"Bilateral renal cysts.  Left lower pole lesion seen on CT represents a proteinaceous cyst,\" and hepatic changes per LFT plan     -No acute intervention  -Will follow up with Urology for hematuria work up outpatient, my seek further recommendations at this time    Lung nodule seen on imaging study  Assessment & Plan  CT chest PE study with finding of new left lower lobe nodule measuring up to 7 mm.    -Follow-up CT chest in 6 months.  -Outpatient follow-up with PCP/pulmonology for further recommendations.    Hematuria, unspecified  Assessment & Plan  Microscopic hematuria  MRI confirming indeterminate lesion to be renal cyst  Also with apparent history cold agglutinin     -Hgb stable  -Will follow up as outpatient with Urology, Dr. Culp for complete work up     History of atrial fibrillation  Assessment & Plan  -Continue Toprol and Eliquis    Cold agglutinin disease (CMS/HCC)  Assessment & Plan  -Continue outpatient follow up with PCP/Heme    Essential hypertension  Assessment & Plan  -Continue amlodipine 5 mg daily, lisinopril 40 mg daily and Toprol- mg daily    * Partial small bowel obstruction (CMS/HCC)  Assessment & Plan  Presented to ED with hypotension as outpatient  CT abdomen pelvis with findings of concern for mildly dilated loops of small bowel nonspecific without a clear transition point which could represent a mild ileus or low-grade partial small bowel obstruction.      -Having BMs, tolerating advanced diet, abdomen is benign   -Appreciate surgery consult, no intervention      SUMMARY OF HOSPITALIZATION      Presenting Problem/History of Present Illness  This is a 77 y.o. year-old male admitted on 7/29/2023 with Partial small bowel obstruction (CMS/HCC).    From H&P: \"Tobias Little is a 77 y.o. male with a past medical history of atrial fibrillation, interstitial lung " "disease, CAD, hyperlipidemia, hypertension, cold agglutinins presents with hypotension neurolyse weakness.  Patient reports that at 7 AM this morning he was feeling clammy and his blood pressure was checked at that time with it being low at 71/58 associated with tachycardia with a heart rate of 108.  Patient reports that he recently was diagnosed with pneumonia at [facility] and is currently on Augmentin and and azithromycin that was started yesterday.  Patient reports having a dry cough without any phlegm production.  Denies any fevers currently but states that earlier this week he was having fevers up to 103 °F.  No associated myalgias or arthralgias.  No associated lightheadedness, dizziness, chest pain, chest pressure, palpitations, shortness of breath, pain with deep respirations, abdominal pain or dysuria.  He reports having urine today that was darker in color but denies any hematuria.  He reports having leg swelling at baseline which has not changed.  Patient reports that he passed a small bowel movement while here in the ER and had a small bowel movement this morning.  States that he has had an increase in belching and has been passing flatus.  Denies abdominal pain.  Denies any recent sick contacts or travel.     In the ER patient received a CT of the chest and abdomen/pelvis which shows multiple findings including a new left lower lobe nodule measuring up to 7 mm, nonspecific heterogeneous enhancement of the liver, new small partially enhancing lesion in the right hepatic lobe, and an indeterminate mass in the left kidney lower pole.  Patient was also found to have incidentally concern for moderately dilated loops of small bowel are nonspecific without a clear transition point which could represent a mild ileus or low-grade partial small bowel obstruction.  Patient was evaluated by surgery who recommended admission to the medical service.\"     Hospital Course    Pt is 78 y/o with PMH as above who was " admitted to [facility] on 7/29 after presenting to ED with hypotension. While in ED CT was concerning for mild ileus or low-grade partial small bowel obstruction and he was admitted for further management.     Regarding concern for bowel obstruction, patient was seen by surgery and no acute intervention was required.  Patient has been passing flatus/bowel movements and tolerated advanced diet without further symptoms.  Hypotension also resolved.    Follow-up MRI of the abdomen/pelvis was obtained for incidentally noted renal and hepatic lesions, ultimately suspected to be cysts on MRI imaging.  Also noted concern for mild hepatic steatosis which he may follow-up as outpatient with PCP/GI regarding. Mildly elevated LFTs previously noted as outpatient.     While admitted, was also seen by urology for microscopic hematuria.  Will follow-up as outpatient for complete work-up.    He is now medically stable for discharge with outpatient follow-up with urology and PCP, also GI/Pulmonology as necessary per discussion with PCP.    Update spouse over the phone at bedside.     Exam on Day of Discharge  Physical Exam  Vitals and nursing note reviewed.   Constitutional:       Comments: Elderly   HENT:      Head: Normocephalic and atraumatic.      Right Ear: External ear normal.      Left Ear: External ear normal.      Nose: Nose normal.      Mouth/Throat:      Mouth: Mucous membranes are moist.      Pharynx: Oropharynx is clear.   Eyes:      Extraocular Movements: Extraocular movements intact.      Pupils: Pupils are equal, round, and reactive to light.   Cardiovascular:      Rate and Rhythm: Normal rate and regular rhythm.      Pulses: Normal pulses.      Heart sounds: Normal heart sounds. No murmur heard.  Pulmonary:      Effort: Pulmonary effort is normal. No respiratory distress.      Breath sounds: Normal breath sounds.      Comments: Room air  Abdominal:      General: Bowel sounds are normal. There is no  distension.      Tenderness: There is no abdominal tenderness.   Musculoskeletal:      Right lower leg: No edema.      Left lower leg: No edema.   Skin:     General: Skin is warm and dry.   Neurological:      General: No focal deficit present.      Mental Status: He is alert and oriented to person, place, and time.   Psychiatric:         Behavior: Behavior normal.         Consults During Admission  IP CONSULT TO UROLOGY    DISCHARGE MEDICATIONS               Medication List      START taking these medications    amoxicillin-pot clavulanate 875-125 mg per tablet  Commonly known as: AUGMENTIN  Take 1 tablet by mouth 2 (two) times a day with meals for 3 doses Indications: pneumonia.  Dose: 1 tablet        CONTINUE taking these medications    amLODIPine 5 mg tablet  Commonly known as: NORVASC  Take 5 mg by mouth daily.  Dose: 5 mg     amoxicillin 500 mg capsule  Commonly known as: AMOXIL  TAKE 4 CAPSULES BY MOUTH 1 HOUR PRIOR TO APPOINTMENT     apixaban 5 mg tablet  Commonly known as: ELIQUIS  Take 5 mg by mouth 2 (two) times a day.  Dose: 5 mg     B COMPLEX ORAL  Take by mouth daily.     coenzyme Q10 200 mg capsule  Commonly known as: COQ10  Take 200 mg by mouth daily.  Dose: 200 mg     latanoprost 0.005 % ophthalmic solution  Commonly known as: XALATAN  Administer 1 drop into affected eye(s) nightly. Into left eye  Dose: 1 drop     lisinopriL 40 mg tablet  Commonly known as: PRINIVIL  Take 40 mg by mouth daily.  Dose: 40 mg     metoprolol succinate  mg 24 hr tablet  Commonly known as: TOPROL-XL  Take 100 mg by mouth daily.  Dose: 100 mg     VITAMIN D3 ORAL  Take 5,000 IU by mouth daily.  Dose: 5,000 IU     ZINC ORAL  Take by mouth daily.        STOP taking these medications    rosuvastatin 10 mg tablet  Commonly known as: CRESTOR             Instructions for after discharge     Call provider for:  difficulty breathing, headache or visual disturbances      Call provider for:  extreme fatigue      Call provider  for:  persistent dizziness or light-headedness      Call provider for:  persistent nausea or vomiting      Call provider for:  rash      Call provider for:  severe uncontrolled pain      Call provider for:  temperature >100.4      Discharge diet      Diet Type / Texture:  Regular  Cardiac (Low Sodium, Low Fat)       Follow-up with Provider:      Within 1-2 weeks for hematuria work up    Delta Camacho MD   [phone]    [address]  [address]  [place] [address]       Follow-up with primary physician (PCP)      Within 1 week with CBC and CMP. Follow up on several incidental findings as detailed in discharge instructions. PCP may help arrange GI/Pulmonology follow up if needed as well.    Post-Discharge Activity: Normal activity as tolerated.      Normal activity as tolerated.             PROCEDURES / LABS / IMAGING      Operative Procedures  None    Other Procedures  none    Pertinent Labs  Results from last 7 days   Lab Units 07/30/23  0607 07/29/23  0929   WBC K/uL 5.14 5.47   HEMOGLOBIN g/dL 11.8* 13.1*   PLATELETS K/uL 130* 132*     Results from last 7 days   Lab Units 07/30/23  0607 07/29/23  0929   SODIUM mEQ/L 139 136   POTASSIUM mEQ/L 4.1 3.9   CHLORIDE mEQ/L 108* 104   CO2 mEQ/L 22 24   BUN mg/dL 18 25   CREATININE mg/dL 0.9 1.2   CALCIUM mg/dL 8.0* 8.7   ALBUMIN g/dL 3.6 4.1   BILIRUBIN TOTAL mg/dL 1.8* 1.8*   ALK PHOS IU/L 59 74   ALT IU/L 44 61*   AST IU/L 33 50*   GLUCOSE mg/dL 91 176*     Pertinent Imaging  MRI PELVIS WITH AND WITHOUT CONTRAST    Result Date: 7/30/2023  IMPRESSION: Mild hepatic steatosis with morphologic changes which can be seen in the setting of chronic compensated liver disease (fibrosis or developing cirrhosis). Subcentimeter lesion seen on CT represents a subcentimeter cyst.  No suspicious hepatic mass seen within visualized portions. Bilateral renal cysts.  Left lower pole lesion seen on CT represents a proteinaceous cyst. No bowel obstruction.  Sigmoid  diverticulosis without diverticulitis.     MRI ABDOMEN WITH AND WITHOUT CONTRAST    Result Date: 7/30/2023  IMPRESSION: Mild hepatic steatosis with morphologic changes which can be seen in the setting of chronic compensated liver disease (fibrosis or developing cirrhosis). Subcentimeter lesion seen on CT represents a subcentimeter cyst.  No suspicious hepatic mass seen within visualized portions. Bilateral renal cysts.  Left lower pole lesion seen on CT represents a proteinaceous cyst. No bowel obstruction.  Sigmoid diverticulosis without diverticulitis.     CT ABDOMEN PELVIS WITH IV CONTRAST    Result Date: 7/29/2023  IMPRESSION: 1.  Nonspecific heterogeneous enhancement of the liver. 2.  New small partially enhancing lesion in the right hepatic lobe probably a small vascular shunt lesion with 2 small to characterize hypodensity in the left hepatic lobe. Indeterminant mass in the left kidney lower pole. These can be correlated with follow-up MRI. 3.  Elevated right hemidiaphragm. This is slightly increased since 2011. 4.  Mildly dilated loops of small bowel nonspecific without a clear transition point could represent a mild ileus or low-grade partial small bowel obstruction. 5.  No evidence of additional acute inflammatory obstructive process in abdomen or pelvis. 6.  Multiple additional findings discussed below. COMMENT: Comparison: CT dated 5/26/2011 TECHNIQUE: CT abdomen pelvis from with contrast. 100 mL Omnipaque 350. CT DOSE:  One or more dose reduction techniques (e.g. automated exposure control, adjustment of the mA and/or kV according to patient size, use of iterative reconstruction technique) utilized for this examination. FINDINGS: LOWER CHEST: CT chest CT dictation. ABDOMEN: LIVER: Liver demonstrates heterogeneous enhancement is nonspecific. Elevated right hemidiaphragm is noted slightly increased compared to 2011 CT  image. No significant enlargement of the liver which measures about 16 cm coronal  plane. Small arterially enhancing lesion suspected vascular shunt lesion is noted laterally in the right hepatic lobe. Tiny low-density lesion in the left hepatic lobe medial segment measuring 0.7 cm too small to characterize possibly a small cyst. These lesions are new from 2004 CT study. BILE DUCTS: normal caliber. GALLBLADDER: No calcified gallstones. Normal caliber wall. PANCREAS: Pancreatic divisum. Top normal caliber pancreatic duct. No suspicious or acute pancreatic findings. SPLEEN: within normal limits. ADRENALS: within normal limits. KIDNEYS: No hydronephrosis. Areas of scarring in the left kidney. Indeterminant small mass exophytic from the lower pole of the right kidney. This could represent a complex cyst or a solid lesion. This is new since 2004. This measures about 1.8 cm in the coronal plane. 3 mm calculus upper pole right kidney. PELVIS: REPRODUCTIVE ORGANS: Enlarged prostate incompletely assessed. URETERS: within normal limits. BLADDER: within normal limits. BOWEL: Severe colonic diverticulosis. There is no evidence of diverticulitis. Normal appearance of terminal ileum. Patient appears to be status post appendectomy. There is a single short segment mildly dilated loop of small bowel in left upper quadrant image 58. In the ventral abdomen there is also some slight dilatation of loops of bowel. No discrete transition zone. Finds could be on the basis of ileus or potentially a low-grade small bowel obstruction as the distal small bowel is normal in caliber. Small diverticula are noted in the terminal ileum.. No commencing small bowel obstruction or significant ileus. Stomach is underdistended. No enlarged mesenteric lymph nodes. PERITONEUM: no ascites or free air, no fluid collection. VESSELS: Patency of major opacified vasculature. No abdominal aortic aneurysm. RETROPERITONEUM: within normal limits. ABDOMINAL WALL: Mild diastases rectus abdominis muscles. Mild dependent edema. BONES: There are  degenerative changes present. Probable demineralization. No acute or suspicious bone findings..     CT ANGIOGRAPHY CHEST PULMONARY EMBOLISM WITH IV CONTRAST    Result Date: 7/29/2023  IMPRESSION: 1.  Interstitial fibrosis possibly with mild paraseptal emphysematous change as discussed below. 2.  New left lower lobe nodule measuring 7 mm. Follow-up recommended 6 months. 3.  No evidence of pulmonary embolism. 4.  Additional findings discussed below. Fleischner Society 2017 Guidelines for Management of Incidentally Detected Pulmonary Nodules in Adults. (Solid Nodules) Nodule size 6-8 mm (single) Low-Risk Patient - CT at 6-12 months, then consider CT at 18-24 months High-Risk Patient - CT at 6-12 months, then CT 18-24 months COMMENT: Comparison: Chest x-ray dated July 29, 2023 Technique: CTA of the chest pulmonary embolism protocol was performed with contrast.  3D MIP and/or volume rendered image reconstruction performed and reviewed. 100 mL Omnipaque 350 given. CT DOSE:  One or more dose reduction techniques (e.g. automated exposure control, adjustment of the mA and/or kV according to patient size, use of iterative reconstruction technique) utilized for this examination. FINDINGS: LUNG, PLEURA AND LARGE AIRWAYS:Trachea and the proximal bronchi remain patent. Bilateral bronchiectasis noted. There is also subpleural reticulation noted bilaterally compatible with element of fibrosis. There is some honeycombing present in the right lower lobe posteriorly (for example thin section axial image 111. This is also present on the 2019 CT. There is also an element of peripheral cystic spaces in the upper lobes more so on the right very similar to the prior study. Is uncertain this is more upper lobe peripheral honeycombing or this represents paraseptal emphysematous change. The distribution of the peripheral dominant fibrosis and early mild honeycombing is suggestive of UIP. In the right lower lobe on thin section axial image 129  there is a 9 mm pulmonary nodule stable compared to 2019. 7 mm nodule left lower lobe image 148 appears to be new. No other suspicious nodular foci. No convincing acute consolidation. There is no pneumothorax or pleural effusion. VESSELS: Normal caliber main pulmonary artery. No evidence of pulmonary embolism. No evidence of acute thoracic aortic findings. There is an ascending thoracic aortic aneurysm measuring approximate 4.3 cm unchanged.. There is severe coronary atherosclerotic calcification incompletely assessed. Great vessel origins are patent. HEART:  Enlarged. No pericardial effusion beyond physiologic appearing fluid MEDIASTINUM AND MORELIA: Small scattered hilar and mediastinal lymph nodes none of which appear pathologic by size criteria CHEST WALL AND LOWER NECK: Loop recorder left anterior chest. UPPER ABDOMEN:Limited assessment with this technique. Refer to CT abdomen pelvis. Small calculus upper pole right kidney. Cyst left upper pole. There may also be a tiny calculus in the left upper pole. Atheromatous changes. BONES: Degenerative disease and Schmorl's nodes. Possible demineralization. No acute or suspicious bone findings.     US venous leg bilateral    Result Date: 7/29/2023  IMPRESSION: No evidence of acute deep venous thrombosis in the visualized venous segments of the bilateral lower extremities.     X-RAY CHEST 2 VIEWS    Result Date: 7/29/2023  IMPRESSION: No significant change from prior. Coarse subpleural reticulation and areas of patchy coarse interstitial opacity likely related to interstitial fibrotic changes as seen on prior CT. No pleural effusion or pneumothorax.       OUTPATIENT  FOLLOW-UP / REFERRALS / PENDING TESTS        Outpatient Follow-Up Appointments            In 1 month Manuel Nichole MD [facility] in [place]          Referrals  No orders of the defined types were placed in this encounter.      Test Results Pending at Discharge  Unresulted Labs (From  admission, onward)    None          Important Issues to Address in Follow-Up  You were admitted to [facility] on 7/29 after presenting to hospital with hypotension.  CT imaging on admission noted concern for possible bowel obstruction, you have been having flatus/bowel movements and advance diet without symptoms.  You were seen in consultation by surgery and no intervention was required.    Also while admitted, you were seen in consultation by urology for microscopic hematuria (blood in the urine) and incidentally noted kidney lesion.  No inpatient intervention was required and you will follow-up as outpatient for complete hematuria work-up.  MRI abdomen was taken in follow-up of indeterminant kidney lesion, and is consistent with proteinaceous cyst, may also discuss this with urology upon follow-up though no acute intervention is required.    MRI of the abdomen was also taken in follow-up of indeterminate liver lesion.  MRI showed evidence of mild hepatic steatosis which can be seen with chronic compensated liver disease, and a subcentimeter cyst.  No acute intervention was required that you should also follow-up with PCP for ongoing liver function monitoring, and possibly GI follow-up which PCP may help arrange.    Also incidentally found to have a left lower lobe lung nodule, measuring 7 mm.  Will need repeat CT of the chest in 6 months and to consider pulmonology follow-up, PCP may help arrange.      Important Medication changes:   -Continue taking Augmentin through 8/1 to complete course for pneumonia  -Stop taking rosuvastatin until follow-up with PCP for liver function monitoring      Please follow up with neurology within 1 to 2 weeks for complete hematuria work-up, and your PCP within 1 week with repeat CBC and CMP.  Consider pulmonology and GI follow-up based on incidental findings above which PCP may help arrange.    DISCHARGE DISPOSITION AND DESTINATION      Disposition: Home   Destination:                               Code Status At Discharge: Full Code    Physician Order for Life-Sustaining Treatment Document Status      No documents found

## 2023-07-31 NOTE — PLAN OF CARE
Plan of Care Review  Plan of Care Reviewed With: patient  Progress: improving  Outcome Evaluation: Pt with no complaints of pain. Vitals stable on room air. Fluids capped per order. OOB independently. Hopeful for DC today. Will ctm.

## 2023-07-31 NOTE — PATIENT INSTRUCTIONS
"THIS DOCUMENT APPEARS AS AN \"AFTER VISIT SUMMARY\" IN [employer].  THIS IS A NON-BILLABLE ENCOUNTER AND THIS NOTE IS FOR RECORD REVIEW OR PATIENT COMMUNICATION PURPOSES.         [address] [facility]  [place]  [address]  Phone  [phone]  Fax  [phone]      July 31, 2023    Tobias Little  [address] [place] [address]    Dear Mr. Little:    You were referred to [facility] when CTA Chest done on 7/29/2023 showed a new lung nodule.  At least 50% of people have lung nodules by age 50; 95% of lung nodules are NOT cancer.  It is recommended that you have a follow up CT chest in 6-12 months to make sure the nodule has not grown or changed.  Follow up is very important to identify possible problems at an early stage.    I notified your primary care provider, Dakotah Villalba MD, of this result and recommendation.  You should discuss appropriate follow up with Dr. Villalba.    Please feel free to call me at [phone] if you have any questions or concerns.    Sincerely,         Shaina Del Rio, FRANCOISN, RN, OCN  Program Navigator  [phone]         "

## 2023-07-31 NOTE — NURSING NOTE
Patient discharged to home with no needs. All discharge instructions, medications, and follow up appointments reviewed at the bedside. All questions asked and all questions answered. IV removed. Patient wheeled out to private vehicle by transport staff.

## 2023-08-03 LAB
BACTERIA BLD CULT: NORMAL
BACTERIA BLD CULT: NORMAL

## 2023-09-15 ENCOUNTER — TELEPHONE (OUTPATIENT)
Dept: SCHEDULING | Facility: CLINIC | Age: 78
End: 2023-09-15
Payer: COMMERCIAL

## 2023-09-15 DIAGNOSIS — I10 ESSENTIAL HYPERTENSION: Primary | ICD-10-CM

## 2023-09-15 DIAGNOSIS — I25.10 CORONARY ARTERY DISEASE INVOLVING NATIVE CORONARY ARTERY OF NATIVE HEART WITHOUT ANGINA PECTORIS: ICD-10-CM

## 2023-09-15 NOTE — TELEPHONE ENCOUNTER
Pt asking for recent labs to be sent to [facility] labs as pt will go Monday 09/18 to have them drawn prior to appt with Dr Nichole 09/19

## 2023-09-18 ENCOUNTER — APPOINTMENT (OUTPATIENT)
Dept: LAB | Facility: CLINIC | Age: 78
End: 2023-09-18
Attending: INTERNAL MEDICINE
Payer: COMMERCIAL

## 2023-09-18 DIAGNOSIS — Z86.79 HISTORY OF ATRIAL FIBRILLATION: ICD-10-CM

## 2023-09-18 DIAGNOSIS — I47.19 ATRIAL TACHYCARDIA (CMS/HCC): ICD-10-CM

## 2023-09-18 DIAGNOSIS — E78.00 HYPERCHOLESTEROLEMIA: ICD-10-CM

## 2023-09-18 DIAGNOSIS — I47.10 SVT (SUPRAVENTRICULAR TACHYCARDIA) (CMS/HCC): ICD-10-CM

## 2023-09-18 DIAGNOSIS — I10 ESSENTIAL HYPERTENSION: ICD-10-CM

## 2023-09-18 DIAGNOSIS — I25.10 CORONARY ARTERY DISEASE INVOLVING NATIVE CORONARY ARTERY OF NATIVE HEART WITHOUT ANGINA PECTORIS: ICD-10-CM

## 2023-09-18 LAB
ALBUMIN SERPL-MCNC: 4.3 G/DL (ref 3.5–5.7)
ALP SERPL-CCNC: 67 IU/L (ref 34–125)
ALT SERPL-CCNC: 14 IU/L (ref 7–52)
ANION GAP SERPL CALC-SCNC: 7 MEQ/L (ref 3–15)
AST SERPL-CCNC: 18 IU/L (ref 13–39)
BILIRUB SERPL-MCNC: 0.8 MG/DL (ref 0.3–1.2)
BUN SERPL-MCNC: 14 MG/DL (ref 7–25)
CALCIUM SERPL-MCNC: 9.6 MG/DL (ref 8.6–10.3)
CHLORIDE SERPL-SCNC: 103 MEQ/L (ref 98–107)
CHOLEST SERPL-MCNC: 137 MG/DL
CO2 SERPL-SCNC: 29 MEQ/L (ref 21–31)
CREAT SERPL-MCNC: 1.1 MG/DL (ref 0.7–1.3)
GFR SERPL CREATININE-BSD FRML MDRD: >60 ML/MIN/1.73M*2
GLUCOSE SERPL-MCNC: 97 MG/DL (ref 70–99)
HDLC SERPL-MCNC: 60 MG/DL
HDLC SERPL: 2.3 {RATIO}
LDLC SERPL CALC-MCNC: 61 MG/DL
NONHDLC SERPL-MCNC: 77 MG/DL
POTASSIUM SERPL-SCNC: 5.1 MEQ/L (ref 3.5–5.1)
PROT SERPL-MCNC: 6.8 G/DL (ref 6–8.2)
SODIUM SERPL-SCNC: 139 MEQ/L (ref 136–145)
TRIGL SERPL-MCNC: 78 MG/DL

## 2023-09-18 PROCEDURE — 80053 COMPREHEN METABOLIC PANEL: CPT

## 2023-09-18 PROCEDURE — 80061 LIPID PANEL: CPT

## 2023-09-18 PROCEDURE — 36415 COLL VENOUS BLD VENIPUNCTURE: CPT

## 2023-09-19 ENCOUNTER — OFFICE VISIT (OUTPATIENT)
Dept: CARDIOLOGY | Facility: CLINIC | Age: 78
End: 2023-09-19
Payer: COMMERCIAL

## 2023-09-19 VITALS
HEART RATE: 94 BPM | BODY MASS INDEX: 31.64 KG/M2 | HEIGHT: 71 IN | SYSTOLIC BLOOD PRESSURE: 110 MMHG | OXYGEN SATURATION: 98 % | DIASTOLIC BLOOD PRESSURE: 70 MMHG | WEIGHT: 226 LBS

## 2023-09-19 DIAGNOSIS — I48.92 ATRIAL FLUTTER, UNSPECIFIED TYPE (CMS/HCC): ICD-10-CM

## 2023-09-19 DIAGNOSIS — I25.10 CORONARY ARTERY DISEASE INVOLVING NATIVE CORONARY ARTERY OF NATIVE HEART WITHOUT ANGINA PECTORIS: ICD-10-CM

## 2023-09-19 DIAGNOSIS — I10 ESSENTIAL HYPERTENSION: ICD-10-CM

## 2023-09-19 DIAGNOSIS — E78.00 HYPERCHOLESTEROLEMIA: Primary | ICD-10-CM

## 2023-09-19 LAB
ATRIAL RATE: 249
QRS DURATION: 96
QT INTERVAL: 372
QTC CALCULATION(BAZETT): 465
R AXIS: 74
T WAVE AXIS: 42
VENTRICULAR RATE: 94

## 2023-09-19 PROCEDURE — 3078F DIAST BP <80 MM HG: CPT | Performed by: INTERNAL MEDICINE

## 2023-09-19 PROCEDURE — 99214 OFFICE O/P EST MOD 30 MIN: CPT | Performed by: INTERNAL MEDICINE

## 2023-09-19 PROCEDURE — 93000 ELECTROCARDIOGRAM COMPLETE: CPT | Performed by: INTERNAL MEDICINE

## 2023-09-19 PROCEDURE — 3008F BODY MASS INDEX DOCD: CPT | Performed by: INTERNAL MEDICINE

## 2023-09-19 PROCEDURE — 3074F SYST BP LT 130 MM HG: CPT | Performed by: INTERNAL MEDICINE

## 2023-09-19 RX ORDER — ROSUVASTATIN CALCIUM 10 MG/1
10 TABLET, COATED ORAL DAILY
COMMUNITY
Start: 2023-09-03

## 2023-09-19 NOTE — PROGRESS NOTES
2023  Dakotah Villalba MD  [address]  [place] [address]    Troy Benoit MD    [address] [place] [address]      Re:  TOBIAS VALVERDE  :  1945      Dear Dakotah:    It was my pleasure to see Tobias Valverde in the cardiovascular office today.  We will follow Tobias for  subclinical coronary artery disease with an Agatston score of 648 with two vessel involvement, hypertension and polygenic hypercholesterolemia.    He  underwent right total knee replacement  at the [facility].  This was complicated by postoperative paroxysmal SVT.  He was discharged on flecainide 100 mg twice daily and Toprol- mg daily.  Ultimately he underwent a Medtronic loop recorder and subsequently ablation for atrial tachyarrhythmias.  He had complications with infection of the Medtronic loop recorder and his recently implanted right total knee.  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  He is now in rate controlled atrial flutter and on a combination of Toprol-XL and Eliquis.     He was followed by Dr. Juan C Lynn at [facility].  Recent discussions indicate that he is satisfied with rate control of his atrial flutter and anticoagulation with the use of Eliquis.  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      At this time we discussed removal of his Medtronic loop recorder as it is not serving any real purpose.  At this time he will remain on Eliquis and not pursue a watchman device. He does have a remote history of subdural hematoma in .     We reviewed his blood pressure recordings over the past few months.  We agreed with the use of lisinopril 40 mg at bedtime and amlodipine at a relatively low dose of 2.5 mg in the morning.  His blood pressures have been reading 130/80.  He is no longer  hydrochlorothiazide as he developed gout with the thiazide diuretic.  His last echocardiogram in 2021 demonstrated normal left ventricular size and preserved systolic function.    Tobias underwent some recent blood work from his hematologist and was noted to have a creatinine of 1.4 and a GFR 52.  His previous creatinine was 1.1 in July 2022 with a GFR greater than 60.  I recommend that he repeat his creatinine and GFR.  Certainly his blood pressures been reasonably well controlled.  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    We reviewed his prevention program in detail.  His last lipid panel is noted below and is excellent.  Presently, he is on Crestor 10 mg daily.     There is evidence of insulin resistance.  We recommended a weight reduction diet.  His goal weight is 220 pounds.  His present weight is 226 pounds.  We discussed a low-carbohydrate, low-saturated fat Mediterranean Diet.    He is considering a left knee total replacement.  He will need preoperative clearance.    He is also been noticing some dyspnea on exertion.  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  He is in chronic atrial flutter with a baseline heart rate of 90.  We will schedule him for stress echo to assess his overall endurance, heart rate and blood pressure response to exercise.  He also carries a history of coronary artery disease and we will rule out any significant coronary ischemia.      PAST MEDICAL HISTORY:   1.  Subclinical coronary artery disease, coronary calcium score 648 with two vessel involvement including the LAD and RCA, March 2016.  Stress echo was performed in April 2016 which was negative for myocardial ischemia or scar, normal left ventricular size, preserved systolic function.   2.  dyslipidemia consistent with mild polygenic hypercholesterolemia, total cholesterol 198, , HDL 65, triglycerides 52, apoB 92, LDL-P 1496, normal LP(a)  3.  MTHFR polymorphism with hyperhomocystinemia  4.  endothelial  dysfunction  5.  insulin resistance  6.  Hypertension  7.  abnormal EKG with incomplete right bundle branch block, frequent PVC's  8.  subdural hematoma in 2008 secondary to traumatic injury head injury secondary to a steel pipe.  Complicated by paroxysmal atrial fibrillation.  Now on Eliquis for persistent atrial flutter.  9.  Dilated aortic root at 4.4 cm.  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  Follow at the [facility].  11.  Postoperative PSVT.  12.  Status post ablation 2019 for atrial fibrillation and PSVT.  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  Now in persistent atrial flutter with brief periods of more rapid atrial fibrillation.  13.  Status post ablation of ventricular arrhythmia LVOT.  2019     Past Medical History:   Diagnosis Date    A-fib (CMS/HCC)     A-fib (CMS/HCC) 2021    Coronary artery disease     COVID-19 vaccine administered     01/23/2021  & 02/24/2021   & 11/12/2021  Moderna    Homozygous for [id number] polymorphism of MTHFR (CMS/HCC)     Hyperlipidemia     Hypertension     Obesity      Past Surgical History:   Procedure Laterality Date    ABLATION OF DYSRHYTHMIC FOCUS  05/2019    APPENDECTOMY      HERNIA REPAIR  07/2021    inguinal hernia's   (X2)    KNEE SURGERY Right 07/2019    infection    REPLACEMENT TOTAL KNEE Right 02/2019    SUBDURAL HEMATOMA EVACUATION VIA CRANIOTOMY      Neurosurgery for subdural hematoma      CURRENT MEDICATIONS:     Current Outpatient Medications:     amLODIPine (NORVASC) 5 mg tablet, Take 5 mg by mouth daily., Disp: , Rfl:     amoxicillin (AMOXIL) 500 mg capsule, TAKE 4 CAPSULES BY MOUTH 1 HOUR PRIOR TO APPOINTMENT, Disp: , Rfl:     apixaban (ELIQUIS) 5 mg tablet, Take 5 mg by mouth 2 (two) times a day., Disp: , Rfl:     cholecalciferol, vitamin D3, (VITAMIN D3 ORAL), Take 5,000 IU by mouth daily., Disp: , Rfl:     coenzyme Q10 (COQ10) 200 mg capsule, Take 200 mg by mouth daily.  , Disp: , Rfl:      latanoprost (XALATAN) 0.005 % ophthalmic solution, Administer 1 drop into both eyes nightly., Disp: , Rfl: 6    lisinopriL 40 mg tablet, Take 40 mg by mouth daily., Disp: , Rfl:     metoprolol succinate XL (TOPROL-XL) 100 mg 24 hr tablet, Take 100 mg by mouth daily. , Disp: , Rfl: 3    rosuvastatin (CRESTOR) 10 mg tablet, Take 10 mg by mouth daily., Disp: , Rfl:     vitamin B complex (B COMPLEX ORAL), Take by mouth daily., Disp: , Rfl:     ZINC ORAL, Take by mouth daily., Disp: , Rfl:     SUPPLEMENTS:  Coenzyme-Q10 100 mg daily, vitamin-D 4543-4665 international units daily, omega-3 fish oil 1000 mg daily, multiple vitamin.    ALLERGIES:  No known drug allergies.    FAMILY HISTORY:  Father  young at age 55 of lung cancer.  Mother has a pacemaker.    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  His wife's name is Jaye.  They have three children.  He has seven grandchildren.  He smoked 38 years ago for five years.  He rarely drinks alcohol.  He has an active lifestyle.  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    REVIEW OF SYSTEMS: Tobias is now in persistent atrial flutter with rate control.    PHYSICAL EXAMINATION:  Vitals:    23 1307   BP: 110/70   Pulse: 94   SpO2: 98%     Wt Readings from Last 3 Encounters:   23 103 kg (226 lb)   23 102 kg (225 lb 2.6 oz)   23 105 kg (230 lb 9.6 oz)     BP Readings from Last 3 Encounters:   23 110/70   23 131/83   23 132/80     HEENT:  Unremarkable.  Neck:  Supple, no JVD.  Lungs:  Clear to auscultation and percussion.  Cardiac:  Regular rate and rhythm.  Abdomen:  Soft, bowel sounds present, no organomegaly.  Extremities:  No edema, pulses intact.  Skin:  Warm and dry.  Neuro:  Alert and oriented X 3.    LABORATORY DATA:      EKG:  Atrial Flutter rate controlled.    Coronary calcium score 2016:  648 with two vessel involvement.       Stress echo was negative for myocardial ischemia or scar, normal left ventricular size, preserved systolic function.    Myocardial perfusion scan February 2019: Normal    Echocardiogram February 2019: Normal left ventricular size preserved function.  Mild mitral and tricuspid regurgitation.  Ascending aorta measured 4.2 cm.    Echocardiogram September 2021:   The left ventricle is normal in size. Normal left ventricular wall thickness. There are no segmental wall motion abnormalities. Normal left ventricular ejection fraction. The left ventricular ejection fraction by visual estimate is 65% to 70%.   Unable to assess LV diastolic function due to arrhythmia.   The right ventricle is normal in size. There is normal function of the right ventricle.   There is no mitral regurgitation. There is no mitral stenosis.   There is no aortic stenosis. There is mild aortic regurgitation.   There is trace tricuspid regurgitation.   The aortic root is mildly enlarged. The aorta measures 4.2 cm at the sinus of Valsalva. The proximal segment of the ascending aorta is mildly enlarged. The proximal segment of the ascending aorta measures 4.0 cm.    Lipid panel:   Component      Latest Ref Rng 12/29/2021 7/21/2022 9/18/2023   Triglycerides      <150 mg/dL 80  45  78    Cholesterol      <=200 mg/dL 149  152  137    HDL      >=40 mg/dL 62  62  60    LDL Calculated      <=100 mg/dL 71  81  61    Non-HDL, Calculated      mg/dL 87  90  77    RISK      <=5.0  2.4  2.5  2.3        IMPRESSIONS/RECOMMENDATIONS:  1. Coronary artery disease.  The patient's stress test was negative for ischemia.  He is not on aspirin as he is on Eliquis.  He will be scheduled for stress echo.  2. Hypertension.  Continue Toprol-XL and lisinopril and amlodipine.  3. Polygenic hypercholesterolemia.  The patient will continue his Crestor  10 mg daily.  Our goal is an LDL below 70.  Needs repeat lipid panel.  4. MTHFR polymorphism.  Continue B-complex with  L5 methyl folate.  5. Insulin resistance.  Continue low carbohydrate low saturated fat Mediterranean diet and exercise program.  6. Obesity.   We discussed a low-carbohydrate, low-saturated fat Mediterranean Diet and an exercise walking program.  His goal weight is under 220 pounds.  7.         Persistent atrial flutter.  Status post A. fib ablation complicated by phrenic nerve paralysis.  Patient is now resigned to rate control and anticoagulation for his atrial flutter.  He was followed by Dr. Juan C Lynn at [facility].  He no longer needs a Medtronic loop recorder.  He will be scheduled for a low-level stress test to assess heart rate response to exertion.  8.         History of subdural hematoma.  This was secondary to head trauma and was not spontaneous.  He is on Eliquis now for his atrial flutter.  9.         History of phrenic nerve injury with diaphragmatic paralysis.  Resolved.  10.       History of total knee replacement.  Followed [facility].  He may need a left total knee replacement.  11.       History of cold agglutinins.  Followed [facility].  12.       Chronic kidney disease stage IIIa.  Patient's recent creatinine is 1.4 with a GFR of 52.  13.       Dilated aortic root.  The patient's recent echocardiogram demonstrated mild dilatation of the sinus of Valsalva at 4.2 cm and ascending thoracic aorta at 4.0 cm.  A CT angiogram of the chest 2019 demonstrated normal aortic root at 3.9 cm.  14.       Dyspnea on exertion.  The patient does have a history of phrenic nerve paralysis as well as chronic atrial flutter with controlled ventricular response.  He will undergo a stress echo to assess his overall exercise endurance, LV function and rule out significant ischemia.    Summary:.      Tobias is demonstrating cardiovascular stability.      We reviewed his cardiac history.  He now has chronic atrial flutter rate controlled with Toprol-XL and anticoagulated  with Eliquis.  Electrophysiology has signed off.    His blood pressures been reasonably well controlled with the use of amlodipine and lisinopril.    His last lipid panel was in July with an LDL of 61.     His weight is 226 pounds with BMI 32.  He was counseled on low carbohydrate low saturated fat Mediterranean diet.    He has noticed some dyspnea on exertion.  He has a history phrenic nerve paralysis.  He will undergo a stress echo as noted above.    He is now suffering from left knee arthritis.  He is contemplating left total knee replacement.    This was a 30 minute patient encounter with greater than 50% time spent in care coordination and counseling.       Sincerely,    Manuel Nichole MD  09/19/23

## 2023-09-19 NOTE — LETTER
2023     Dakotah Villalba MD  [address] [address]    Patient: Tobias Valverde  YOB: 1945  Date of Visit: 2023      Dear Dr. Villalba:    Thank you for referring Tobias Valverde to me for evaluation. Below are my notes for this consultation.    If you have questions, please do not hesitate to call me. I look forward to following your patient along with you.         Sincerely,        Manuel Nichole MD        CC: No Recipients    Manuel Nichole MD  2023  1:56 PM  Signed  2023  Dakotah Villalba MD  [address] [address]    Troy Beniot MD    [address] [place] [address]      Re:  TOBIAS VALVERDE  :  1945      Dear Dakotah:    It was my pleasure to see Tobias Valverde in the cardiovascular office today.  We will follow Tobias for  subclinical coronary artery disease with an Agatston score of 648 with two vessel involvement, hypertension and polygenic hypercholesterolemia.    He  underwent right total knee replacement  at the [facility].  This was complicated by postoperative paroxysmal SVT.  He was discharged on flecainide 100 mg twice daily and Toprol- mg daily.  Ultimately he underwent a Medtronic loop recorder and subsequently ablation for atrial tachyarrhythmias.  He had complications with infection of the Medtronic loop recorder and his recently implanted right total knee.  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  He is now in rate controlled atrial flutter and on a combination of Toprol-XL and Eliquis.     He was followed by Dr. Juan C Lynn at [facility].  Recent discussions indicate that he is satisfied with rate control of his atrial flutter and anticoagulation with the use of Eliquis.  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      At this time we discussed removal of his Medtronic loop recorder as it is not serving any real purpose.  At this time he will remain on Eliquis and not pursue a watchman device. He does have a remote history of subdural hematoma in 2008.     We reviewed his blood pressure recordings over the past few months.  We agreed with the use of lisinopril 40 mg at bedtime and amlodipine at a relatively low dose of 2.5 mg in the morning.  His blood pressures have been reading 130/80.  He is no longer hydrochlorothiazide as he developed gout with the thiazide diuretic.  His last echocardiogram in 2021 demonstrated normal left ventricular size and preserved systolic function.    Tobias underwent some recent blood work from his hematologist and was noted to have a creatinine of 1.4 and a GFR 52.  His previous creatinine was 1.1 in July 2022 with a GFR greater than 60.  I recommend that he repeat his creatinine and GFR.  Certainly his blood pressures been reasonably well controlled.  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    We reviewed his prevention program in detail.  His last lipid panel is noted below and is excellent.  Presently, he is on Crestor 10 mg daily.     There is evidence of insulin resistance.  We recommended a weight reduction diet.  His goal weight is 220 pounds.  His present weight is 226 pounds.  We discussed a low-carbohydrate, low-saturated fat Mediterranean Diet.    He is considering a left knee total replacement.  He will need preoperative clearance.    He is also been noticing some dyspnea on exertion.  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  He is in chronic atrial flutter with a baseline heart rate of 90.  We will schedule him for stress echo to assess his overall endurance, heart rate and blood pressure response to exercise.  He also carries a history of coronary artery disease and we will rule out any significant coronary  ischemia.      PAST MEDICAL HISTORY:   1.  Subclinical coronary artery disease, coronary calcium score 648 with two vessel involvement including the LAD and RCA, March 2016.  Stress echo was performed in April 2016 which was negative for myocardial ischemia or scar, normal left ventricular size, preserved systolic function.   2.  dyslipidemia consistent with mild polygenic hypercholesterolemia, total cholesterol 198, , HDL 65, triglycerides 52, apoB 92, LDL-P 1496, normal LP(a)  3.  MTHFR polymorphism with hyperhomocystinemia  4.  endothelial dysfunction  5.  insulin resistance  6.  Hypertension  7.  abnormal EKG with incomplete right bundle branch block, frequent PVC's  8.  subdural hematoma in 2008 secondary to traumatic injury head injury secondary to a steel pipe.  Complicated by paroxysmal atrial fibrillation.  Now on Eliquis for persistent atrial flutter.  9.  Dilated aortic root at 4.4 cm.  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  Follow at the [facility].  11.  Postoperative PSVT.  12.  Status post ablation 2019 for atrial fibrillation and PSVT.  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  Now in persistent atrial flutter with brief periods of more rapid atrial fibrillation.  13.  Status post ablation of ventricular arrhythmia LVOT.  2019     Past Medical History:   Diagnosis Date    A-fib (CMS/HCC)     A-fib (CMS/HCC) 2021    Coronary artery disease     COVID-19 vaccine administered     01/23/2021  & 02/24/2021   & 11/12/2021  Moderna    Homozygous for C677T polymorphism of MTHFR (CMS/HCC)     Hyperlipidemia     Hypertension     Obesity      Past Surgical History:   Procedure Laterality Date    ABLATION OF DYSRHYTHMIC FOCUS  05/2019    APPENDECTOMY      HERNIA REPAIR  07/2021    inguinal hernia's   (X2)    KNEE SURGERY Right 07/2019    infection    REPLACEMENT TOTAL KNEE Right 02/2019    SUBDURAL HEMATOMA EVACUATION VIA CRANIOTOMY      Neurosurgery for  subdural hematoma      CURRENT MEDICATIONS:     Current Outpatient Medications:     amLODIPine (NORVASC) 5 mg tablet, Take 5 mg by mouth daily., Disp: , Rfl:     amoxicillin (AMOXIL) 500 mg capsule, TAKE 4 CAPSULES BY MOUTH 1 HOUR PRIOR TO APPOINTMENT, Disp: , Rfl:     apixaban (ELIQUIS) 5 mg tablet, Take 5 mg by mouth 2 (two) times a day., Disp: , Rfl:     cholecalciferol, vitamin D3, (VITAMIN D3 ORAL), Take 5,000 IU by mouth daily., Disp: , Rfl:     coenzyme Q10 (COQ10) 200 mg capsule, Take 200 mg by mouth daily.  , Disp: , Rfl:     latanoprost (XALATAN) 0.005 % ophthalmic solution, Administer 1 drop into both eyes nightly., Disp: , Rfl: 6    lisinopriL 40 mg tablet, Take 40 mg by mouth daily., Disp: , Rfl:     metoprolol succinate XL (TOPROL-XL) 100 mg 24 hr tablet, Take 100 mg by mouth daily. , Disp: , Rfl: 3    rosuvastatin (CRESTOR) 10 mg tablet, Take 10 mg by mouth daily., Disp: , Rfl:     vitamin B complex (B COMPLEX ORAL), Take by mouth daily., Disp: , Rfl:     ZINC ORAL, Take by mouth daily., Disp: , Rfl:     SUPPLEMENTS:  Coenzyme-Q10 100 mg daily, vitamin-D 1075-8070 international units daily, omega-3 fish oil 1000 mg daily, multiple vitamin.    ALLERGIES:  No known drug allergies.    FAMILY HISTORY:  Father  young at age 55 of lung cancer.  Mother has a pacemaker.    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  His wife's name is Jaye.  They have three children.  He has seven grandchildren.  He smoked 38 years ago for five years.  He rarely drinks alcohol.  He has an active lifestyle.  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    REVIEW OF SYSTEMS: Tobias is now in persistent atrial flutter with rate control.    PHYSICAL EXAMINATION:  Vitals:    23 1307   BP: 110/70   Pulse: 94   SpO2: 98%     Wt Readings from Last 3 Encounters:   23 103 kg (226 lb)   23 102 kg (225 lb 2.6 oz)    02/02/23 105 kg (230 lb 9.6 oz)     BP Readings from Last 3 Encounters:   09/19/23 110/70   07/31/23 131/83   02/02/23 132/80     HEENT:  Unremarkable.  Neck:  Supple, no JVD.  Lungs:  Clear to auscultation and percussion.  Cardiac:  Regular rate and rhythm.  Abdomen:  Soft, bowel sounds present, no organomegaly.  Extremities:  No edema, pulses intact.  Skin:  Warm and dry.  Neuro:  Alert and oriented X 3.    LABORATORY DATA:      EKG:  Atrial Flutter rate controlled.    Coronary calcium score 2016:  648 with two vessel involvement.      Stress echo was negative for myocardial ischemia or scar, normal left ventricular size, preserved systolic function.    Myocardial perfusion scan February 2019: Normal    Echocardiogram February 2019: Normal left ventricular size preserved function.  Mild mitral and tricuspid regurgitation.  Ascending aorta measured 4.2 cm.    Echocardiogram September 2021:   The left ventricle is normal in size. Normal left ventricular wall thickness. There are no segmental wall motion abnormalities. Normal left ventricular ejection fraction. The left ventricular ejection fraction by visual estimate is 65% to 70%.   Unable to assess LV diastolic function due to arrhythmia.   The right ventricle is normal in size. There is normal function of the right ventricle.   There is no mitral regurgitation. There is no mitral stenosis.   There is no aortic stenosis. There is mild aortic regurgitation.   There is trace tricuspid regurgitation.   The aortic root is mildly enlarged. The aorta measures 4.2 cm at the sinus of Valsalva. The proximal segment of the ascending aorta is mildly enlarged. The proximal segment of the ascending aorta measures 4.0 cm.    Lipid panel:   Component      Latest Ref Rng 12/29/2021 7/21/2022 9/18/2023   Triglycerides      <150 mg/dL 80  45  78    Cholesterol      <=200 mg/dL 149  152  137    HDL      >=40 mg/dL 62  62  60    LDL Calculated      <=100 mg/dL 71  81  61     Non-HDL, Calculated      mg/dL 87  90  77    RISK      <=5.0  2.4  2.5  2.3        IMPRESSIONS/RECOMMENDATIONS:  1. Coronary artery disease.  The patient's stress test was negative for ischemia.  He is not on aspirin as he is on Eliquis.  He will be scheduled for stress echo.  2. Hypertension.  Continue Toprol-XL and lisinopril and amlodipine.  3. Polygenic hypercholesterolemia.  The patient will continue his Crestor  10 mg daily.  Our goal is an LDL below 70.  Needs repeat lipid panel.  4. MTHFR polymorphism.  Continue B-complex with L5 methyl folate.  5. Insulin resistance.  Continue low carbohydrate low saturated fat Mediterranean diet and exercise program.  6. Obesity.   We discussed a low-carbohydrate, low-saturated fat Mediterranean Diet and an exercise walking program.  His goal weight is under 220 pounds.  7.         Persistent atrial flutter.  Status post A. fib ablation complicated by phrenic nerve paralysis.  Patient is now resigned to rate control and anticoagulation for his atrial flutter.  He was followed by Dr. Juan C Lynn at [facility].  He no longer needs a Medtronic loop recorder.  He will be scheduled for a low-level stress test to assess heart rate response to exertion.  8.         History of subdural hematoma.  This was secondary to head trauma and was not spontaneous.  He is on Eliquis now for his atrial flutter.  9.         History of phrenic nerve injury with diaphragmatic paralysis.  Resolved.  10.       History of total knee replacement.  Followed [facility].  He may need a left total knee replacement.  11.       History of cold agglutinins.  Followed [facility].  12.       Chronic kidney disease stage IIIa.  Patient's recent creatinine is 1.4 with a GFR of 52.  13.       Dilated aortic root.  The patient's recent echocardiogram demonstrated mild dilatation of the sinus of Valsalva at 4.2 cm and ascending thoracic aorta at 4.0 cm.  A CT  angiogram of the chest 2019 demonstrated normal aortic root at 3.9 cm.  14.       Dyspnea on exertion.  The patient does have a history of phrenic nerve paralysis as well as chronic atrial flutter with controlled ventricular response.  He will undergo a stress echo to assess his overall exercise endurance, LV function and rule out significant ischemia.    Summary:.      Tobias is demonstrating cardiovascular stability.      We reviewed his cardiac history.  He now has chronic atrial flutter rate controlled with Toprol-XL and anticoagulated with Eliquis.  Electrophysiology has signed off.    His blood pressures been reasonably well controlled with the use of amlodipine and lisinopril.    His last lipid panel was in July with an LDL of 61.     His weight is 226 pounds with BMI 32.  He was counseled on low carbohydrate low saturated fat Mediterranean diet.    He has noticed some dyspnea on exertion.  He has a history phrenic nerve paralysis.  He will undergo a stress echo as noted above.    He is now suffering from left knee arthritis.  He is contemplating left total knee replacement.    This was a 30 minute patient encounter with greater than 50% time spent in care coordination and counseling.       Sincerely,    Manuel Nichole MD  09/19/23

## 2023-09-19 NOTE — PROGRESS NOTES
rprise ID: [id number]  2023    Dakotah Villalba MD  [address]  [place] [address]    Troy Benoit MD    [address] [place] [address]      Re:  TOBIAS VALVERDE  :  1945      Dear Dakotah:    It was my pleasure to see Tobias Valverde in the cardiovascular office today.  We will follow Tobias for  subclinical coronary artery disease with an Agatston score of 648 with two vessel involvement, hypertension and polygenic hypercholesterolemia.    He  underwent right total knee replacement  at the [facility].  This was complicated by postoperative paroxysmal SVT.  He was discharged on flecainide 100 mg twice daily and Toprol- mg daily.  Ultimately he underwent a Medtronic loop recorder and subsequently ablation for atrial tachyarrhythmias.  He had complications with infection of the Medtronic loop recorder and is recently implanted right total knee.  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  He is now in rate controlled atrial flutter and on a combination of Toprol-XL and Eliquis.     He was followed by Dr. Juan C Lynn at [facility].  Recent discussions indicate that he is satisfied with rate control of his atrial flutter and anticoagulation with the use of Eliquis.  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      At this time we discussed removal of his Medtronic loop recorder as it is not serving any real purpose.  At this time he will remain on Eliquis and not pursue a watchman device. He does have a remote history of subdural hematoma in .     We reviewed his blood pressure recordings over the past few months.  We agreed with the use of lisinopril 40 mg at bedtime and amlodipine at a relatively low dose of 2.5 mg in the morning.  His blood pressures have been reading 130/80.  He  is no longer hydrochlorothiazide as he developed gout with the thiazide diuretic.  His last echocardiogram in 2021 demonstrated normal left ventricular size and preserved systolic function.    Tobias underwent some recent blood work from his hematologist and was noted to have a creatinine of 1.4 and a GFR 52.  His previous creatinine was 1.1 in July 2022 with a GFR greater than 60.  I recommend that he repeat his creatinine and GFR.  Certainly his blood pressures been reasonably well controlled.  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    We reviewed his prevention program in detail.  His last lipid panel is noted below and is excellent.  Presently, he is on Crestor 10 mg daily.  He needs a repeat lipid panel.    There is evidence of insulin resistance.  We recommended a weight reduction diet.  His goal weight is 220 pounds.  We discussed a low-carbohydrate, low-saturated fat Mediterranean Diet.      PAST MEDICAL HISTORY:   1.  Subclinical coronary artery disease, coronary calcium score 648 with two vessel involvement including the LAD and RCA, March 2016.  Stress echo was performed in April 2016 which was negative for myocardial ischemia or scar, normal left ventricular size, preserved systolic function.   2.  dyslipidemia consistent with mild polygenic hypercholesterolemia, total cholesterol 198, , HDL 65, triglycerides 52, apoB 92, LDL-P 1496, normal LP(a)  3.  MTHFR polymorphism with hyperhomocystinemia  4.  endothelial dysfunction  5.  insulin resistance  6.  Hypertension  7.  abnormal EKG with incomplete right bundle branch block, frequent PVC's  8.  subdural hematoma in 2008 secondary to traumatic injury head injury secondary to a steel pipe.  Complicated by paroxysmal atrial fibrillation.  Now on Eliquis for persistent atrial flutter.  9.  Dilated aortic root at 4.4 cm.  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  Follow at the [facility].  11.  Postoperative PSVT.  12.  Status post ablation 2019 for  atrial fibrillation and PSVT.  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  Now in persistent atrial flutter with brief periods of more rapid atrial fibrillation.  13.  Status post ablation of ventricular arrhythmia LVOT.  2019     Past Medical History:   Diagnosis Date    A-fib (CMS/HCC)     A-fib (CMS/HCC) 2021    Coronary artery disease     COVID-19 vaccine administered     01/23/2021  & 02/24/2021   & 11/12/2021  Moderna    Homozygous for C677T polymorphism of MTHFR (CMS/HCC)     Hyperlipidemia     Hypertension     Obesity      Past Surgical History:   Procedure Laterality Date    ABLATION OF DYSRHYTHMIC FOCUS  05/2019    APPENDECTOMY      HERNIA REPAIR  07/2021    inguinal hernia's   (X2)    KNEE SURGERY Right 07/2019    infection    REPLACEMENT TOTAL KNEE Right 02/2019    SUBDURAL HEMATOMA EVACUATION VIA CRANIOTOMY      Neurosurgery for subdural hematoma      CURRENT MEDICATIONS:     Current Outpatient Medications:     amLODIPine (NORVASC) 5 mg tablet, Take 5 mg by mouth daily., Disp: , Rfl:     amoxicillin (AMOXIL) 500 mg capsule, TAKE 4 CAPSULES BY MOUTH 1 HOUR PRIOR TO APPOINTMENT, Disp: , Rfl:     apixaban (ELIQUIS) 5 mg tablet, Take 5 mg by mouth 2 (two) times a day., Disp: , Rfl:     cholecalciferol, vitamin D3, (VITAMIN D3 ORAL), Take 5,000 IU by mouth daily., Disp: , Rfl:     coenzyme Q10 (COQ10) 200 mg capsule, Take 200 mg by mouth daily.  , Disp: , Rfl:     latanoprost (XALATAN) 0.005 % ophthalmic solution, Administer 1 drop into both eyes nightly., Disp: , Rfl: 6    lisinopriL 40 mg tablet, Take 40 mg by mouth daily., Disp: , Rfl:     metoprolol succinate XL (TOPROL-XL) 100 mg 24 hr tablet, Take 100 mg by mouth daily. , Disp: , Rfl: 3    rosuvastatin (CRESTOR) 10 mg tablet, Take 10 mg by mouth daily., Disp: , Rfl:     vitamin B complex (B COMPLEX ORAL), Take by mouth daily., Disp: , Rfl:     ZINC ORAL, Take by mouth daily., Disp: , Rfl:      SUPPLEMENTS:  Coenzyme-Q10 100 mg daily, vitamin-D 2341-0079 international units daily, omega-3 fish oil 1000 mg daily, multiple vitamin.    ALLERGIES:  No known drug allergies.    FAMILY HISTORY:  Father  young at age 55 of lung cancer.  Mother has a pacemaker.    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  His wife's name is Jaye.  They have three children.  He has seven grandchildren.  He smoked 38 years ago for five years.  He rarely drinks alcohol.  He has an active lifestyle.  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    REVIEW OF SYSTEMS: Tobias is now in persistent atrial flutter with rate control.    PHYSICAL EXAMINATION:    Vitals:    23 1307   BP: 110/70   Pulse: 94   SpO2: 98%     Wt Readings from Last 3 Encounters:   23 103 kg (226 lb)   23 102 kg (225 lb 2.6 oz)   23 105 kg (230 lb 9.6 oz)     BP Readings from Last 3 Encounters:   23 110/70   23 131/83   23 132/80     HEENT:  Unremarkable.  Neck:  Supple, no JVD.  Lungs:  Clear to auscultation and percussion.  Cardiac:  Regular rate and rhythm.  Abdomen:  Soft, bowel sounds present, no organomegaly.  Extremities:  No edema, pulses intact.  Skin:  Warm and dry.  Neuro:  Alert and oriented X 3.    LABORATORY DATA:      EKG:  Atrial Flutter rate controlled.    Coronary calcium score:  648 with two vessel involvement.      Stress echo was negative for myocardial ischemia or scar, normal left ventricular size, preserved systolic function.    Myocardial perfusion scan 2019: Normal    Echocardiogram 2019: Normal left ventricular size preserved function.  Mild mitral and tricuspid regurgitation.  Ascending aorta measured 4.2 cm.    Echocardiogram 2021:   The left ventricle is normal in size. Normal left ventricular wall thickness. There are no segmental wall motion abnormalities. Normal  left ventricular ejection fraction. The left ventricular ejection fraction by visual estimate is 65% to 70%.   Unable to assess LV diastolic function due to arrhythmia.   The right ventricle is normal in size. There is normal function of the right ventricle.   There is no mitral regurgitation. There is no mitral stenosis.   There is no aortic stenosis. There is mild aortic regurgitation.   There is trace tricuspid regurgitation.   The aortic root is mildly enlarged. The aorta measures 4.2 cm at the sinus of Valsalva. The proximal segment of the ascending aorta is mildly enlarged. The proximal segment of the ascending aorta measures 4.0 cm.    Lipid panel:   Component      Latest Ref Rng 12/29/2021 7/21/2022 9/18/2023   Triglycerides      <150 mg/dL 80  45  78    Cholesterol      <=200 mg/dL 149  152  137    HDL      >=40 mg/dL 62  62  60    LDL Calculated      <=100 mg/dL 71  81  61    Non-HDL, Calculated      mg/dL 87  90  77    RISK      <=5.0  2.4  2.5  2.3            IMPRESSIONS/RECOMMENDATIONS:  1. Coronary artery disease.  The patient's stress test was negative for ischemia.  He is not on aspirin as he is on Eliquis.  2. Hypertension.  Continue Toprol-XL and lisinopril and amlodipine  3. Polygenic hypercholesterolemia.  The patient will continue his Crestor  10 mg daily.  Our goal is an LDL below 70.  Needs repeat lipid panel.  4. MTHFR polymorphism.  Continue B-complex with L5 methyl folate.  5. Insulin resistance.  Continue low carbohydrate low saturated fat Mediterranean diet and exercise program.  6. Obesity.   We discussed a low-carbohydrate, low-saturated fat Mediterranean Diet and an exercise walking program.  7.         Persistent atrial flutter.  Status post A. fib ablation complicated by phrenic nerve paralysis.  Patient is now resigned to rate control and anticoagulation for his atrial flutter.  He was followed by Dr. Juan C Lynn at [facility].  He no longer needs a Medtronic  loop recorder.  8.         History of subdural hematoma.  This was secondary to head trauma and was not spontaneous.  He is on Eliquis now for his atrial flutter.  9.         History of phrenic nerve injury with diaphragmatic paralysis.  Resolved.  10.       History of total knee replacement.  Followed [facility].  He may need a left total knee replacement.  11.       History of cold agglutinins.  Followed [facility].  12.       Chronic kidney disease stage IIIa.  Patient's recent creatinine is 1.4 with a GFR of 52.  13.       Dilated aortic root.  The patient's recent echocardiogram demonstrated mild dilatation of the sinus of Valsalva at 4.2 cm and ascending thoracic aorta at 4.0 cm.  A CT angiogram of the chest 2019 demonstrated normal aortic root at 3.9 cm.    Summary:.      Tobias is demonstrating cardiovascular stability.      Blood work was recently ordered by his hematologist following his cold agglutinins that included renal function.  His creatinine has risen to 1.4 with a GFR of 52.  This needs to be repeated.  If necessary he needs to follow-up with nephrology.      We reviewed his cardiac history.  He now has chronic atrial flutter rate controlled with Toprol-XL and anticoagulated with Eliquis.  Electrophysiology has signed off.    His blood pressures been reasonably well controlled with the use of amlodipine and lisinopril.    His last lipid panel was in July with an LDL of 81.  He will undergo repeat lipid panel.    His weight is 230 pounds with BMI 32.  He was counseled on low carbohydrate low saturated fat Mediterranean diet.    This was a 30 minute patient encounter with greater than 50% time spent in care coordination and counseling.     Sincerely,    Manuel Nichole MD  09/19/23

## 2023-11-13 ENCOUNTER — OFFICE VISIT (OUTPATIENT)
Dept: THORACIC SURGERY | Facility: CLINIC | Age: 78
End: 2023-11-13
Payer: COMMERCIAL

## 2023-11-13 VITALS
OXYGEN SATURATION: 97 % | HEIGHT: 70 IN | DIASTOLIC BLOOD PRESSURE: 80 MMHG | HEART RATE: 86 BPM | WEIGHT: 233 LBS | RESPIRATION RATE: 20 BRPM | BODY MASS INDEX: 33.36 KG/M2 | SYSTOLIC BLOOD PRESSURE: 120 MMHG

## 2023-11-13 DIAGNOSIS — R91.8 MULTIPLE LUNG NODULES ON CT: Primary | ICD-10-CM

## 2023-11-13 DIAGNOSIS — J84.10 PULMONARY FIBROSIS (CMS/HCC): ICD-10-CM

## 2023-11-13 PROCEDURE — 3079F DIAST BP 80-89 MM HG: CPT | Performed by: THORACIC SURGERY (CARDIOTHORACIC VASCULAR SURGERY)

## 2023-11-13 PROCEDURE — 3008F BODY MASS INDEX DOCD: CPT | Performed by: THORACIC SURGERY (CARDIOTHORACIC VASCULAR SURGERY)

## 2023-11-13 PROCEDURE — 3074F SYST BP LT 130 MM HG: CPT | Performed by: THORACIC SURGERY (CARDIOTHORACIC VASCULAR SURGERY)

## 2023-11-13 PROCEDURE — 99204 OFFICE O/P NEW MOD 45 MIN: CPT | Performed by: THORACIC SURGERY (CARDIOTHORACIC VASCULAR SURGERY)

## 2023-11-13 NOTE — PROGRESS NOTES
Thoracic Surgery Consultation      Patient ID: Tobias Little                              : 1945  MRN: [id number]    Clinic:  [facility]                                            Visit Date: 2023  Encounter Provider: Abner Menchaca  Referring Provider: Dakotah Villalba MD    Subjective       Tobias Little is a very pleasant 78 y/o former smoker (10-year history, quit ) who presents today for initial consultation for newly identified 6 mm left lower lobe nodule in the setting of pulmonary fibrotic changes, paraseptal emphysema changes, and a right lower lobe nodule which appears stable since CT imaging in 2019..    His review of systems is notable for excellent exercise tolerance.  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  Appetite is good and weight is stable.  He does have a history of atrial flutter and a history of high blood pressure.  He has a history of gout.  He has had 2 subdural hematomas related to trauma but the most recent event in .  On his first event he had craniotomy for management.  He does not have cough, hemoptysis, or dyspnea.  The remainder of his review of systems is negative.           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  Past Surgical History:  has a past surgical history that includes Appendectomy; Subdural hematoma evacuation via craniotomy; Replacement total knee (Right, 2019); Ablation of dysrhythmic focus (2019); Knee surgery (Right, 2019); and Hernia repair (2021).  Social History:   Social History   She is .  He owns a construction company.  He has 3  adult children  Tobacco Use    Smoking status: Former    Smokeless tobacco: Former    Tobacco comments:     Pt Quit Smoking over 40 years ago    Vaping  "Use    Vaping Use: Never used   Substance Use Topics    Alcohol use: Yes     Comment: Occassionally     Drug use: No     Family History: family history includes Heart disease in his biological mother; Lung cancer in his biological father.  Medications:   Current Outpatient Medications:     amLODIPine (NORVASC) 5 mg tablet, Take 5 mg by mouth daily., Disp: , Rfl:     apixaban (ELIQUIS) 5 mg tablet, Take 5 mg by mouth 2 (two) times a day., Disp: , Rfl:     cholecalciferol, vitamin D3, (VITAMIN D3 ORAL), Take 5,000 IU by mouth daily., Disp: , Rfl:     coenzyme Q10 (COQ10) 200 mg capsule, Take 200 mg by mouth daily.  , Disp: , Rfl:     latanoprost (XALATAN) 0.005 % ophthalmic solution, Administer 1 drop into both eyes nightly., Disp: , Rfl: 6    lisinopriL 40 mg tablet, Take 40 mg by mouth daily., Disp: , Rfl:     metoprolol succinate XL (TOPROL-XL) 100 mg 24 hr tablet, Take 100 mg by mouth daily. , Disp: , Rfl: 3    rosuvastatin (CRESTOR) 10 mg tablet, Take 10 mg by mouth daily., Disp: , Rfl:     vitamin B complex (B COMPLEX ORAL), Take by mouth daily., Disp: , Rfl:     ZINC ORAL, Take by mouth daily., Disp: , Rfl:     amoxicillin (AMOXIL) 500 mg capsule, TAKE 4 CAPSULES BY MOUTH 1 HOUR PRIOR TO APPOINTMENT, Disp: , Rfl:   Allergies:   Allergies   Allergen Reactions    No Known Allergies        E-cigarette/Vaping     Questions Responses    E-cigarette/Vaping Use Never User            Objective   Physical Exam:  Visit Vitals  /80 (BP Location: Right upper arm, Patient Position: Sitting)   Pulse 86   Resp 20   Ht 1.778 m (5' 10\")   Wt 106 kg (233 lb)   SpO2 97%   BMI 33.43 kg/m²       Constitutional: No acute distress. Appears younger than his stated age.   Neurologic: Alert and Oriented with no deficit or asymmetry.  Cooperative affect  Head: NCAT  Eyes: Antiicteric, EOMI.  Neck: Trachea is midline. No cervical or supraclavicular adenopathy.   Respiratory: Clear to auscultation bilaterally without " wheeze, rhonchi, or crackle noted.   Cardiovascular: Regular rate and rhythm.     Abdomen: Soft, Non-tender non-distended.  .  Musculoskeletal: Ambulates without deficit. Normal and symmetric strength.  Extremities: No cyanosis, clubbing or LE edema  Skin: Intact and without lesion, wound or rash of concern.     Performance Status:   ECO  We have independently evaluated his imaging studies and reports.  Assessment   This pleasant 77-year-old male with a remote smoking history (10 years quit ) has a new 6 to 7 mm nodule in the left lower lobe in the setting of pulmonary fibrosis.  He does have a stable right lower lobe nodule.  His most recent imaging was in 2023.  We will obtain a CT scan now and assess for interval change.

## 2023-11-13 NOTE — LETTER
11/13/2023  Re: Tobias Little  1945        Dear Dakotah Villalba MD    I appreciate the opportunity to evaluate  your patient Tobias Little in the office today.    Tobias Little is a very pleasant 76 y/o former smoker (10-year history, quit 1980) who presents today for initial consultation for newly identified 6 mm left lower lobe nodule in the setting of pulmonary fibrotic changes, paraseptal emphysema changes, and a right lower lobe nodule which appears stable since CT imaging in 2019..    His review of systems is notable for excellent exercise tolerance.  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  Appetite is good and weight is stable.  He does have a history of atrial flutter and a history of high blood pressure.  He has a history of gout.  He has had 2 subdural hematomas related to trauma but the most recent event in 2010.  On his first event he had craniotomy for management.  He does not have cough, hemoptysis, or dyspnea.  The remainder of his review of systems is negative.        This pleasant 77-year-old male with a remote smoking history (10 years quit 1980) has a new 6 to 7 mm nodule in the left lower lobe in the setting of pulmonary fibrosis.  He does have a stable right lower lobe nodule.  His most recent imaging was in July 2023.  We will obtain a CT scan now and assess for interval change.      Thank you for the opportunity to participate in his care.    Abner Menchaca MD PhD

## 2023-11-17 ENCOUNTER — HOSPITAL ENCOUNTER (OUTPATIENT)
Dept: RADIOLOGY | Age: 78
Discharge: HOME | End: 2023-11-17
Attending: PHYSICIAN ASSISTANT
Payer: COMMERCIAL

## 2023-11-17 DIAGNOSIS — J84.10 PULMONARY FIBROSIS (CMS/HCC): ICD-10-CM

## 2023-11-17 DIAGNOSIS — R91.8 MULTIPLE LUNG NODULES ON CT: ICD-10-CM

## 2023-11-17 PROCEDURE — 71250 CT THORAX DX C-: CPT | Mod: ME

## 2023-11-21 ENCOUNTER — TELEPHONE (OUTPATIENT)
Dept: SCHEDULING | Facility: CLINIC | Age: 78
End: 2023-11-21
Payer: COMMERCIAL

## 2023-11-27 ENCOUNTER — OFFICE VISIT (OUTPATIENT)
Dept: THORACIC SURGERY | Facility: CLINIC | Age: 78
End: 2023-11-27
Payer: COMMERCIAL

## 2023-11-27 VITALS
HEIGHT: 70 IN | WEIGHT: 230 LBS | BODY MASS INDEX: 32.93 KG/M2 | TEMPERATURE: 98.1 F | SYSTOLIC BLOOD PRESSURE: 142 MMHG | OXYGEN SATURATION: 98 % | HEART RATE: 108 BPM | DIASTOLIC BLOOD PRESSURE: 90 MMHG

## 2023-11-27 DIAGNOSIS — R91.8 MULTIPLE LUNG NODULES ON CT: Primary | ICD-10-CM

## 2023-11-27 DIAGNOSIS — R91.1 LUNG NODULE SEEN ON IMAGING STUDY: ICD-10-CM

## 2023-11-27 PROCEDURE — 3008F BODY MASS INDEX DOCD: CPT | Performed by: PHYSICIAN ASSISTANT

## 2023-11-27 PROCEDURE — 99214 OFFICE O/P EST MOD 30 MIN: CPT | Performed by: PHYSICIAN ASSISTANT

## 2023-11-27 PROCEDURE — 3077F SYST BP >= 140 MM HG: CPT | Performed by: PHYSICIAN ASSISTANT

## 2023-11-27 PROCEDURE — 3080F DIAST BP >= 90 MM HG: CPT | Performed by: PHYSICIAN ASSISTANT

## 2023-11-27 NOTE — PROGRESS NOTES
Thoracic Surgery    Patient ID: Tobisa Little                              : 1945  MRN: [id number]  Clinic: [facility]                                            Visit Date: 2023  Encounter Provider: Josi Merritt  Referring Provider: Dakotah Villalba MD       Patient: Tobias Little  Chief Complaint: Lung nodule follow-up      Subjective     Tobias Little is a 77 y.o. former limited smoker (10-year history, quit ) who presents today for lung nodule follow up.  We first met him last month in follow up for a Chest CT after respiratory illness this past summer. A newly identified 6 mm left lower lobe nodule was found in the setting of pulmonary fibrotic changes, paraseptal emphysema changes, and a right lower lobe nodule which appears stable since CT imaging in 2019.  He obtained new CT on  and presents with his wife to review. She has many questions regarding the findings of prior films with particular focus on the fibrotic changes and regarding his diaphragm since he has elevation of the right in 2019.  He has h/o a fib and long standing aflutter. He had ablation in 2019 and is on Eliquis without recent bleeding issue.      His ROS is notable for feeling well overall.  Appetite and weight are stable. No cough or hemoptysis.  He has occasional NIEVES but not often and he does not have limitation in things he wants to do.  He has occasional palpitation and known chronic atrial flutter.        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  Past Surgical History:  has a past surgical history that includes Appendectomy; Subdural hematoma evacuation via craniotomy; Replacement total knee (Right, 2019); Ablation of dysrhythmic focus (2019); Knee surgery (Right, 2019); and Hernia  "repair (07/2021).  Social History:   Social History     Tobacco Use   • Smoking status: Former   • Smokeless tobacco: Former   • Tobacco comments:     Pt Quit Smoking over 40 years ago    Vaping Use   • Vaping Use: Never used   Substance Use Topics   • Alcohol use: Yes     Comment: Occassionally    • Drug use: No     Medications:   Current Outpatient Medications   Medication Sig Dispense Refill   • amLODIPine (NORVASC) 5 mg tablet Take 5 mg by mouth daily.     • apixaban (ELIQUIS) 5 mg tablet Take 5 mg by mouth 2 (two) times a day.     • cholecalciferol, vitamin D3, (VITAMIN D3 ORAL) Take 5,000 IU by mouth daily.     • coenzyme Q10 (COQ10) 200 mg capsule Take 200 mg by mouth daily.       • latanoprost (XALATAN) 0.005 % ophthalmic solution Administer 1 drop into both eyes nightly.  6   • lisinopriL 40 mg tablet Take 40 mg by mouth daily.     • metoprolol succinate XL (TOPROL-XL) 100 mg 24 hr tablet Take 100 mg by mouth daily.   3   • rosuvastatin (CRESTOR) 10 mg tablet Take 10 mg by mouth daily.     • vitamin B complex (B COMPLEX ORAL) Take by mouth daily.     • ZINC ORAL Take by mouth daily.     • amoxicillin (AMOXIL) 500 mg capsule TAKE 4 CAPSULES BY MOUTH 1 HOUR PRIOR TO APPOINTMENT       No current facility-administered medications for this visit.       Allergies:   Allergies   Allergen Reactions   • No Known Allergies           Objective   Vitals:   Visit Vitals  BP (!) 142/90 (BP Location: Right upper arm, Patient Position: Sitting)   Pulse (!) 108   Temp 36.7 °C (98.1 °F)   Ht 1.778 m (5' 10\")   Wt 104 kg (230 lb)   SpO2 98%   BMI 33.00 kg/m²      General: he appears very well and in no distress  He ambulates independently into the office with steady gait and has no swathi limitation   Pulmonary: he has good air entry bilaterally with occasional crackle noted (ausc triangle)   CV: RRR   Extremities: no clubbing, cyanosis or edema. Good pulses bilat      Imaging Review: we have done an extensive review of chest " imaging dating back to 2004.  At that time there was not clear evidence of fibrotic changes.  The RLL nodule is present at 5 mm.  It was followed and ultimately stabilized closer to 7 mm by 2008.  There is evidence of inflammatory and/or fibrotic changes at that time.  2011 imaging also showed stability in the RLL nodule and mild fibrotic changes.  In 2019, the above findings were overall stable but he had elevation of the right hemidiaphragm.  July 2023 imaging shows similar finding as above although the newer measurements of the RLL nodule is 9mm with a new 6mm LLL nodule.  The 9mm RLL nodule and fibrotic changes continue to be stable as does the newer 6mm LLL nodule.  The right hemidiaphragm appears to have improved some since 2019. There is a stable 4.3 ascending aortic aneurysm.      Assessment   We have had detailed discussion of the findings and the good news of stability.  We have discussed getting established with our Pulmonary medicine colleagues and doing PFTs with 6 min walk to further quantify where he is at this baseline and feeling well.  He followed with Pulmonology years ago but not recently. He would like to discuss this more at his 6-month follow up appt.  He and his wife are very appreciative and will call in the meantime with changes, question or concern.

## 2023-11-27 NOTE — LETTER
2023     Dakotah Villalba MD  [address]  [place] [address]    Patient: Tobias Little  YOB: 1945  Date of Visit: 2023      Dear Dr. Villalba:    Thank you for referring Tobias Little to me for evaluation. Below are my notes for this consultation.    If you have questions, please do not hesitate to call me. I look forward to following your patient along with you.         Sincerely,        DESIRAE Shah        CC: No Recipients    Josi Merritt PA C  2023  7:45 PM  Sign when Signing Visit  Thoracic Surgery    Patient ID: Tobias Little                              : 1945  MRN: [id number]  Clinic: [facility]                                            Visit Date: 2023  Encounter Provider: Josi Merritt  Referring Provider: Dakotah Villalba MD       Patient: Tobias Little  Chief Complaint: Lung nodule follow-up      Subjective     Tobias Little is a 77 y.o. former limited smoker (10-year history, quit ) who presents today for lung nodule follow up.  We first met him last month in follow up for a Chest CT after respiratory illness this past summer. A newly identified 6 mm left lower lobe nodule was found in the setting of pulmonary fibrotic changes, paraseptal emphysema changes, and a right lower lobe nodule which appears stable since CT imaging in 2019.  He obtained new CT on  and presents with his wife to review. She has many questions regarding the findings of prior films with particular focus on the fibrotic changes and regarding his diaphragm since he has elevation of the right in 2019.  He has h/o a fib and long standing aflutter. He had ablation in 2019 and is on Eliquis without recent bleeding issue.      His ROS is notable for feeling well overall.  Appetite and weight are stable. No cough or hemoptysis.  He has occasional NIEVES but not often and he does not have limitation in things he wants to do.  He has  occasional palpitation and known chronic atrial flutter.       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  Past Surgical History:  has a past surgical history that includes Appendectomy; Subdural hematoma evacuation via craniotomy; Replacement total knee (Right, 02/2019); Ablation of dysrhythmic focus (05/2019); Knee surgery (Right, 07/2019); and Hernia repair (07/2021).  Social History:   Social History     Tobacco Use   • Smoking status: Former   • Smokeless tobacco: Former   • Tobacco comments:     Pt Quit Smoking over 40 years ago    Vaping Use   • Vaping Use: Never used   Substance Use Topics   • Alcohol use: Yes     Comment: Occassionally    • Drug use: No     Medications:   Current Outpatient Medications   Medication Sig Dispense Refill   • amLODIPine (NORVASC) 5 mg tablet Take 5 mg by mouth daily.     • apixaban (ELIQUIS) 5 mg tablet Take 5 mg by mouth 2 (two) times a day.     • cholecalciferol, vitamin D3, (VITAMIN D3 ORAL) Take 5,000 IU by mouth daily.     • coenzyme Q10 (COQ10) 200 mg capsule Take 200 mg by mouth daily.       • latanoprost (XALATAN) 0.005 % ophthalmic solution Administer 1 drop into both eyes nightly.  6   • lisinopriL 40 mg tablet Take 40 mg by mouth daily.     • metoprolol succinate XL (TOPROL-XL) 100 mg 24 hr tablet Take 100 mg by mouth daily.   3   • rosuvastatin (CRESTOR) 10 mg tablet Take 10 mg by mouth daily.     • vitamin B complex (B COMPLEX ORAL) Take by mouth daily.     • ZINC ORAL Take by mouth daily.     • amoxicillin (AMOXIL) 500 mg capsule TAKE 4 CAPSULES BY MOUTH 1 HOUR PRIOR TO APPOINTMENT       No current facility-administered medications for this visit.       Allergies:   Allergies   Allergen Reactions   • No Known Allergies           Objective   Vitals:   Visit  "Vitals  BP (!) 142/90 (BP Location: Right upper arm, Patient Position: Sitting)   Pulse (!) 108   Temp 36.7 °C (98.1 °F)   Ht 1.778 m (5' 10\")   Wt 104 kg (230 lb)   SpO2 98%   BMI 33.00 kg/m²      General: he appears very well and in no distress  He ambulates independently into the office with steady gait and has no swathi limitation   Pulmonary: he has good air entry bilaterally with occasional crackle noted (ausc triangle)   CV: RRR   Extremities: no clubbing, cyanosis or edema. Good pulses bilat      Imaging Review: we have done an extensive review of chest imaging dating back to 2004.  At that time there was not clear evidence of fibrotic changes.  The RLL nodule is present at 5 mm.  It was followed and ultimately stabilized closer to 7 mm by 2008.  There is evidence of inflammatory and/or fibrotic changes at that time.  2011 imaging also showed stability in the RLL nodule and mild fibrotic changes.  In 2019, the above findings were overall stable but he had elevation of the right hemidiaphragm.  July 2023 imaging shows similar finding as above although the newer measurements of the RLL nodule is 9mm with a new 6mm LLL nodule.  The 9mm RLL nodule and fibrotic changes continue to be stable as does the newer 6mm LLL nodule.  The right hemidiaphragm appears to have improved some since 2019. There is a stable 4.3 ascending aortic aneurysm.      Assessment   We have had detailed discussion of the findings and the good news of stability.  We have discussed getting established with our Pulmonary medicine colleagues and doing PFTs with 6 min walk to further quantify where he is at this baseline and feeling well.  He followed with Pulmonology years ago but not recently. He would like to discuss this more at his 6-month follow up appt.  He and his wife are very appreciative and will call in the meantime with changes, question or concern.        "

## 2023-12-19 ENCOUNTER — OFFICE VISIT (OUTPATIENT)
Dept: CARDIOLOGY | Facility: CLINIC | Age: 78
End: 2023-12-19
Payer: COMMERCIAL

## 2023-12-19 ENCOUNTER — TELEPHONE (OUTPATIENT)
Dept: SCHEDULING | Facility: CLINIC | Age: 78
End: 2023-12-19
Payer: COMMERCIAL

## 2023-12-19 VITALS
HEART RATE: 87 BPM | DIASTOLIC BLOOD PRESSURE: 80 MMHG | OXYGEN SATURATION: 98 % | SYSTOLIC BLOOD PRESSURE: 124 MMHG | BODY MASS INDEX: 33.21 KG/M2 | WEIGHT: 232 LBS | HEIGHT: 70 IN

## 2023-12-19 DIAGNOSIS — I48.92 ATRIAL FLUTTER, UNSPECIFIED TYPE (CMS/HCC): ICD-10-CM

## 2023-12-19 DIAGNOSIS — E78.00 HYPERCHOLESTEROLEMIA: Primary | ICD-10-CM

## 2023-12-19 DIAGNOSIS — I25.10 CORONARY ARTERY DISEASE INVOLVING NATIVE CORONARY ARTERY OF NATIVE HEART WITHOUT ANGINA PECTORIS: ICD-10-CM

## 2023-12-19 LAB
ATRIAL RATE: 242
QRS DURATION: 94
QT INTERVAL: 398
QTC CALCULATION(BAZETT): 478
R AXIS: 62
T WAVE AXIS: 33
VENTRICULAR RATE: 87

## 2023-12-19 PROCEDURE — 3008F BODY MASS INDEX DOCD: CPT | Performed by: NURSE PRACTITIONER

## 2023-12-19 PROCEDURE — 93000 ELECTROCARDIOGRAM COMPLETE: CPT | Mod: XU | Performed by: NURSE PRACTITIONER

## 2023-12-19 PROCEDURE — 99214 OFFICE O/P EST MOD 30 MIN: CPT | Performed by: NURSE PRACTITIONER

## 2023-12-19 PROCEDURE — 3074F SYST BP LT 130 MM HG: CPT | Performed by: NURSE PRACTITIONER

## 2023-12-19 PROCEDURE — 3079F DIAST BP 80-89 MM HG: CPT | Performed by: NURSE PRACTITIONER

## 2023-12-19 NOTE — PROGRESS NOTES
2023  Dakotah Villalba MD  [address]  [place] [address]    Troy Benoit MD    [address] [place] [address]      Re:  TOBIAS VALVERDE  :  1945      Dear Dakotah:    It was my pleasure to see Tobias Valverde in the cardiovascular office today.  We will follow Tobias for  subclinical coronary artery disease with an Agatston score of 648 with two vessel involvement, hypertension and polygenic hypercholesterolemia.    He  underwent right total knee replacement  at the [facility].  This was complicated by postoperative paroxysmal SVT.  He was discharged on flecainide 100 mg twice daily and Toprol- mg daily.  Ultimately he underwent a Medtronic loop recorder and subsequently ablation for atrial tachyarrhythmias.  He had complications with infection of the Medtronic loop recorder and his recently implanted right total knee.  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  He is now in rate controlled atrial flutter and on a combination of Toprol-XL and Eliquis.     He was followed by Dr. Juan C Lynn at [place].  Recent discussions indicate that he is satisfied with rate control of his atrial flutter and anticoagulation with the use of Eliquis.  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      At this time we discussed removal of his Medtronic loop recorder as it is not serving any real purpose.  At this time he will remain on Eliquis and not pursue a watchman device. He does have a remote history of subdural hematoma in .     We reviewed his blood pressure recordings over the past few months.  We agreed with the use of lisinopril 40 mg at bedtime and amlodipine at a relatively low dose of 2.5 mg in the morning.  His blood pressures have been reading 124/80.  He is no longer on  hydrochlorothiazide as he developed gout with the thiazide diuretic.  His last echocardiogram in 2021 demonstrated normal left ventricular size and preserved systolic function.    Tobias underwent some recent blood work from his hematologist and was noted to have a creatinine of 1.4 and a GFR 52.  His previous creatinine was 1.1 in July 2022 with a GFR greater than 60.  I recommend that he repeat his creatinine and GFR.  Certainly his blood pressures been reasonably well controlled.  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    We reviewed his prevention program in detail.  His last lipid panel is noted below and is excellent.  Presently, he is on Crestor 10 mg daily.     There is evidence of insulin resistance.  We recommended a weight reduction diet.  His goal weight is 220 pounds.  His present weight is 226 pounds.  We discussed a low-carbohydrate, low-saturated fat Mediterranean Diet.      He is also been noticing some dyspnea on exertion.  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  He is in chronic atrial flutter with a baseline heart rate of 90.  We will schedule him for stress echo to assess his overall endurance, heart rate and blood pressure response to exercise.  He also carries a history of coronary artery disease and we will rule out any significant coronary ischemia.  Unfortunately has developed significant left knee arthritis and will not be able to undergo a stress echo.  Will schedule him for a PET scan to rule out ischemia.    Today Tobias reports to the office after visiting his ophthalmologist.  Apparently he has had some bleeding involving his right and left eyes.  There is some concern about him remaining on Eliquis.  We will contact his ophthalmologist to see if we need to stop the Eliquis.    This led to a rediscussion of his atrial flutter and anticoagulation.  He has not had any procedures on his atrial flutter.  There is been no cardioversion and there is been no ablation.   During sexual relations apparently his heart races.  We will ask him to wear a Zio patch for 1 week.  Certainly he may need to entertain ablation of his atrial flutter.  His secondary issue is anticoagulation is now becoming an issue related to his retinopathy and retinal bleeding.  Will contact his ophthalmologist to see what procedures are being planned.  Obviously he would be a good watchman candidate so we will refer him to Dr. Jamie Garcia to assess his candidacy for this procedure to get him off the Eliquis      PAST MEDICAL HISTORY:   1.  Subclinical coronary artery disease, coronary calcium score 648 with two vessel involvement including the LAD and RCA, March 2016.  Stress echo was performed in April 2016 which was negative for myocardial ischemia or scar, normal left ventricular size, preserved systolic function.   2.  dyslipidemia consistent with mild polygenic hypercholesterolemia, total cholesterol 198, , HDL 65, triglycerides 52, apoB 92, LDL-P 1496, normal LP(a)  3.  MTHFR polymorphism with hyperhomocystinemia  4.  endothelial dysfunction  5.  insulin resistance  6.  Hypertension  7.  abnormal EKG with incomplete right bundle branch block, frequent PVC's  8.  subdural hematoma in 2008 secondary to traumatic injury head injury secondary to a steel pipe.  Complicated by paroxysmal atrial fibrillation.  Now on Eliquis for persistent atrial flutter.  9.  Dilated aortic root at 4.4 cm.  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  Follow at the [facility].  11.  Postoperative PSVT.  12.  Status post ablation 2019 for atrial fibrillation and PSVT.  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  Now in persistent atrial flutter with brief periods of more rapid atrial fibrillation.  13.  Status post ablation of ventricular arrhythmia LVOT.  2019     Past Medical History:   Diagnosis Date   • A-fib (CMS/[facility])    • A-fib (CMS/[facility]) 2021   • Coronary artery disease    • COVID-19  vaccine administered     2021  & 2021   & 2021  Moderna   • Homozygous for C677T polymorphism of MTHFR (CMS/HCC)    • Hyperlipidemia    • Hypertension    • Obesity      Past Surgical History:   Procedure Laterality Date   • ABLATION OF DYSRHYTHMIC FOCUS  2019   • APPENDECTOMY     • HERNIA REPAIR  2021    inguinal hernia's   (X2)   • KNEE SURGERY Right 2019    infection   • REPLACEMENT TOTAL KNEE Right 2019   • SUBDURAL HEMATOMA EVACUATION VIA CRANIOTOMY      Neurosurgery for subdural hematoma      CURRENT MEDICATIONS:     Current Outpatient Medications:   •  amLODIPine (NORVASC) 5 mg tablet, Take 5 mg by mouth daily., Disp: , Rfl:   •  amoxicillin (AMOXIL) 500 mg capsule, TAKE 4 CAPSULES BY MOUTH 1 HOUR PRIOR TO APPOINTMENT, Disp: , Rfl:   •  apixaban (ELIQUIS) 5 mg tablet, Take 5 mg by mouth 2 (two) times a day., Disp: , Rfl:   •  cholecalciferol, vitamin D3, (VITAMIN D3 ORAL), Take 5,000 IU by mouth daily., Disp: , Rfl:   •  coenzyme Q10 (COQ10) 200 mg capsule, Take 200 mg by mouth daily.  , Disp: , Rfl:   •  latanoprost (XALATAN) 0.005 % ophthalmic solution, Administer 1 drop into both eyes nightly., Disp: , Rfl: 6  •  lisinopriL 40 mg tablet, Take 40 mg by mouth daily., Disp: , Rfl:   •  metoprolol succinate XL (TOPROL-XL) 100 mg 24 hr tablet, Take 100 mg by mouth daily. , Disp: , Rfl: 3  •  rosuvastatin (CRESTOR) 10 mg tablet, Take 10 mg by mouth daily., Disp: , Rfl:   •  vitamin B complex (B COMPLEX ORAL), Take by mouth daily., Disp: , Rfl:   •  ZINC ORAL, Take by mouth daily., Disp: , Rfl:     SUPPLEMENTS:  Coenzyme-Q10 100 mg daily, vitamin-D 5313-5845 international units daily, omega-3 fish oil 1000 mg daily, multiple vitamin.    ALLERGIES:  No known drug allergies.    FAMILY HISTORY:  Father  young at age 55 of lung cancer.  Mother has a pacemaker.    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  He is retired.  His wife's name  is Jaye.  They have three children.  He has seven grandchildren.  He smoked 38 years ago for five years.  He rarely drinks alcohol.  He has an active lifestyle.  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    REVIEW OF SYSTEMS: Tobias is now in persistent atrial flutter with rate control.    PHYSICAL EXAMINATION:  Vitals:    12/19/23 1102   BP: 124/80   Pulse: 87   SpO2: 98%     Wt Readings from Last 3 Encounters:   12/19/23 105 kg (232 lb)   11/27/23 104 kg (230 lb)   11/13/23 106 kg (233 lb)     BP Readings from Last 3 Encounters:   12/19/23 124/80   11/27/23 (!) 142/90   11/13/23 120/80     HEENT:  Unremarkable.  Neck:  Supple, no JVD.  Lungs:  Clear to auscultation and percussion.  Cardiac:  Regular rate and rhythm.  Abdomen:  Soft, bowel sounds present, no organomegaly.  Extremities:  No edema, pulses intact.  Skin:  Warm and dry.  Neuro:  Alert and oriented X 3.    LABORATORY DATA:      EKG:  Atrial Flutter rate controlled.    Coronary calcium score 2016:  648 with two vessel involvement.      Stress echo was negative for myocardial ischemia or scar, normal left ventricular size, preserved systolic function.    Myocardial perfusion scan February 2019: Normal    Echocardiogram February 2019: Normal left ventricular size preserved function.  Mild mitral and tricuspid regurgitation.  Ascending aorta measured 4.2 cm.    Echocardiogram September 2021:  • The left ventricle is normal in size. Normal left ventricular wall thickness. There are no segmental wall motion abnormalities. Normal left ventricular ejection fraction. The left ventricular ejection fraction by visual estimate is 65% to 70%.  • Unable to assess LV diastolic function due to arrhythmia.  • The right ventricle is normal in size. There is normal function of the right ventricle.  • There is no mitral regurgitation. There is no mitral stenosis.  • There is no aortic stenosis. There is mild aortic regurgitation.  • There is  trace tricuspid regurgitation.  • The aortic root is mildly enlarged. The aorta measures 4.2 cm at the sinus of Valsalva. The proximal segment of the ascending aorta is mildly enlarged. The proximal segment of the ascending aorta measures 4.0 cm.    Lipid panel:   Component      Latest Ref Rng 12/29/2021 7/21/2022 9/18/2023   Triglycerides      <150 mg/dL 80  45  78    Cholesterol      <=200 mg/dL 149  152  137    HDL      >=40 mg/dL 62  62  60    LDL Calculated      <=100 mg/dL 71  81  61    Non-HDL, Calculated      mg/dL 87  90  77    RISK      <=5.0  2.4  2.5  2.3        IMPRESSIONS/RECOMMENDATIONS:  1. Coronary artery disease.  The patient's stress test was negative for ischemia.  He is not on aspirin as he is on Eliquis.  He will be scheduled for a PET scan.  2. Hypertension.  Continue Toprol-XL and lisinopril and amlodipine.  3. Polygenic hypercholesterolemia.  The patient will continue his Crestor  10 mg daily.  Our goal is an LDL below 70.  Needs repeat lipid panel.  4. MTHFR polymorphism.  Continue B-complex with L5 methyl folate.  5. Insulin resistance.  Continue low carbohydrate low saturated fat Mediterranean diet and exercise program.  6. Obesity.   We discussed a low-carbohydrate, low-saturated fat Mediterranean Diet and an exercise walking program.  His goal weight is under 220 pounds.  7.         Persistent atrial flutter.  Status post A. fib ablation complicated by phrenic nerve paralysis.  Patient is now resigned to rate control and anticoagulation for his atrial flutter.  He was followed by Dr. Juan C Lynn at [facility].  8.         History of subdural hematoma.  This was secondary to head trauma and was not spontaneous.  He is on Eliquis now for his atrial flutter.  9.         History of phrenic nerve injury with diaphragmatic paralysis.  Resolved.  10.       History of total knee replacement.  Followed [facility].  He may need a left total knee replacement.  11.        History of cold agglutinins.  Followed [facility].  12.       Chronic kidney disease stage IIIa.  Patient's recent creatinine is 1.4 with a GFR of 52.  13.       Dilated aortic root.  The patient's recent echocardiogram demonstrated mild dilatation of the sinus of Valsalva at 4.2 cm and ascending thoracic aorta at 4.0 cm.  A CT angiogram of the chest 2019 demonstrated normal aortic root at 3.9 cm.  14.       Dyspnea on exertion.  The patient does have a history of phrenic nerve paralysis as well as chronic atrial flutter with controlled ventricular response.  He will undergo a Zio patch to evaluate his heart rate at rest and with exertion.    Summary:.      Tobias is demonstrating cardiovascular stability.      We reviewed his cardiac history.  He now has chronic atrial flutter rate controlled with Toprol-XL and anticoagulated with Eliquis.  We may need to reentertain ablation for his atrial flutter.  Will undergo a Zio patch to assess his heart rate variability.    His blood pressures been reasonably well controlled with the use of amlodipine and lisinopril.    His last lipid panel was in July with an LDL of 61.     His weight is 232 pounds with BMI 32.  He was counseled on low carbohydrate low saturated fat Mediterranean diet.    He has noticed some dyspnea on exertion.  He has a history phrenic nerve paralysis.  He will undergo a PET scan to evaluate for ischemia.    He is now suffering from left knee arthritis.  He is contemplating left total knee replacement.  This is being placed on hold.    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  Will discuss discontinuing his Eliquis.  With his ophthalmologist Dr Harley Carranza.  Apparently she requested bilateral carotid ultrasounds to rule out significant carotid arterial sclerosis.  She feels that the Eliquis may be aggravating Valsalva induced dot hemorrhages.  Ideally we would like to move him off of Eliquis.  This would necessitate a  discussion with Dr. Dakotah Garcia in our structural heart Watchman device program..    This was a 30 minute patient encounter with greater than 50% time spent in care coordination and counseling.       Sincerely,    Manuel Nichole MD    12/19/23

## 2023-12-19 NOTE — TELEPHONE ENCOUNTER
Pt and spouse called to leave the pt's Eye doctor information     [facility]  Dr. Harley Carranza MD  Phone: [phone]

## 2023-12-25 PROBLEM — Z15.89 MTHFR GENE MUTATION: Status: RESOLVED | Noted: 2018-05-14 | Resolved: 2023-12-25

## 2023-12-25 PROBLEM — R23.3 BRUISES EASILY: Status: RESOLVED | Noted: 2018-08-23 | Resolved: 2023-12-25

## 2023-12-25 PROBLEM — I48.0 PAROXYSMAL ATRIAL FIBRILLATION (CMS/HCC): Status: ACTIVE | Noted: 2019-02-23

## 2023-12-25 PROBLEM — I48.4 ATYPICAL ATRIAL FLUTTER (CMS/HCC): Status: ACTIVE | Noted: 2023-12-25

## 2023-12-25 PROBLEM — Z96.651 S/P TKR (TOTAL KNEE REPLACEMENT), RIGHT: Status: RESOLVED | Noted: 2019-02-18 | Resolved: 2023-12-25

## 2023-12-25 PROBLEM — Z86.79 HISTORY OF ATRIAL FIBRILLATION: Status: RESOLVED | Noted: 2019-02-21 | Resolved: 2023-12-25

## 2023-12-25 PROBLEM — K56.600 PARTIAL SMALL BOWEL OBSTRUCTION (CMS/HCC): Status: RESOLVED | Noted: 2023-07-29 | Resolved: 2023-12-25

## 2023-12-25 PROBLEM — J18.9 PNEUMONIA: Status: RESOLVED | Noted: 2023-07-31 | Resolved: 2023-12-25

## 2023-12-29 ENCOUNTER — OFFICE VISIT (OUTPATIENT)
Dept: CARDIOLOGY | Facility: CLINIC | Age: 78
End: 2023-12-29
Payer: COMMERCIAL

## 2023-12-29 VITALS
HEIGHT: 70 IN | BODY MASS INDEX: 33.09 KG/M2 | WEIGHT: 231.1 LBS | HEART RATE: 88 BPM | DIASTOLIC BLOOD PRESSURE: 82 MMHG | SYSTOLIC BLOOD PRESSURE: 112 MMHG

## 2023-12-29 DIAGNOSIS — I47.10 SVT (SUPRAVENTRICULAR TACHYCARDIA) (CMS/HCC): ICD-10-CM

## 2023-12-29 DIAGNOSIS — I48.0 PAROXYSMAL ATRIAL FIBRILLATION (CMS/HCC): ICD-10-CM

## 2023-12-29 DIAGNOSIS — I48.4 ATYPICAL ATRIAL FLUTTER (CMS/HCC): Primary | ICD-10-CM

## 2023-12-29 DIAGNOSIS — I10 ESSENTIAL HYPERTENSION: ICD-10-CM

## 2023-12-29 PROBLEM — E88.819 INSULIN RESISTANCE: Status: RESOLVED | Noted: 2018-05-14 | Resolved: 2023-12-29

## 2023-12-29 PROBLEM — R79.89 ELEVATED LFTS: Status: RESOLVED | Noted: 2023-07-30 | Resolved: 2023-12-29

## 2023-12-29 PROBLEM — J98.6 ACQUIRED ELEVATED DIAPHRAGM: Status: RESOLVED | Noted: 2019-06-18 | Resolved: 2023-12-29

## 2023-12-29 PROCEDURE — 3079F DIAST BP 80-89 MM HG: CPT | Performed by: INTERNAL MEDICINE

## 2023-12-29 PROCEDURE — 3074F SYST BP LT 130 MM HG: CPT | Performed by: INTERNAL MEDICINE

## 2023-12-29 PROCEDURE — 3008F BODY MASS INDEX DOCD: CPT | Performed by: INTERNAL MEDICINE

## 2023-12-29 PROCEDURE — 99205 OFFICE O/P NEW HI 60 MIN: CPT | Mod: 25 | Performed by: INTERNAL MEDICINE

## 2023-12-29 PROCEDURE — 93000 ELECTROCARDIOGRAM COMPLETE: CPT | Mod: XU | Performed by: INTERNAL MEDICINE

## 2023-12-29 ASSESSMENT — ENCOUNTER SYMPTOMS
WHEEZING: 0
ARTHRALGIAS: 0
FATIGUE: 0
BACK PAIN: 0
CONFUSION: 0
COUGH: 0
DIARRHEA: 0
ACTIVITY CHANGE: 0
DIZZINESS: 0
ABDOMINAL PAIN: 0
MYALGIAS: 0

## 2023-12-29 ASSESSMENT — CHADS2 SCORE
CHADS2 SCORE: 4
SEX: MALE
CHF: NO
PRIOR STROKE OR TIA OR THROMBOEMBOLISM: NO
VASCULAR DISEASE: YES (+1 PT.)
HYPERTENSION: YES (+1 PT.)
AGE: 75+ (+2 PT.)
DIABETES: NO

## 2023-12-29 NOTE — ASSESSMENT & PLAN NOTE
He had some form of SVT in 2019.  He underwent extensive catheter ablation targeted atrial fibrillation, atrial flutter and frequent PVCs.  He has not had any documented SVT since then.

## 2023-12-29 NOTE — ASSESSMENT & PLAN NOTE
The history is outlined above.  He remains in atypical atrial flutter with no evidence for atrial fibrillation.

## 2023-12-29 NOTE — ASSESSMENT & PLAN NOTE
He has had multiple arrhythmias as documented above, but each of his electrocardiograms since his ablation have shown either sinus rhythm or the same atypical atrial flutter that he currently has.  He assures me that he has absolutely no symptoms and no way of telling that he is in an arrhythmia.  We had an extensive discussion today about options for therapy which include catheter ablation targeting this atrial flutter, antiarrhythmic drug therapy or simply rate control.  Given that he is entirely asymptomatic and appears to be rate controlled, the benefits of undergoing catheter ablation at this stage are unknown.  There are no large randomized clinical trials that definitively show an advantage with catheter ablation in this situation.  There are some smaller trials that may suggest benefit in terms of preventing hospitalizations, heart failure and cognitive dysfunction over time.  Given the lack of supportive data, I am comfortable with simply continuing with rate control and anticoagulation as long as his rate control is adequate.    We also had an extensive discussion about stroke prevention.  He is on Eliquis at an appropriate dose and that is the gold standard therapy at this stage.  Left atrial appendage closure is an option and can be valuable for people who have a reason not to take oral anticoagulants.  He does not have any bleeding complications and can tolerate Eliquis.  We spoke about the left atrial appendage closure procedure in a fair amount of detail and after discussion he will remain on Eliquis for now.    In short, I am not making any changes in his medications.  If his rate control proves to be in adequate he can increase rate controlling medications.  If he chooses to consider left atrial appendage closure in the future we will have another discussion about it.  He should return for follow-up with me in a year unless new arrhythmia issues arise.

## 2023-12-29 NOTE — LETTER
December 29, 2023     Dakotah Villalba MD  [address]  [place] [address]    Patient: Tobias Little  YOB: 1945  Date of Visit: 12/29/2023      Dear Dr. Villalba:    Thank you for referring Tobias Little to me for evaluation. Below are my notes for this consultation.    If you have questions, please do not hesitate to call me. I look forward to following your patient along with you.         Sincerely,        Alan Su MD        CC: MD Georges Trevizo, Alan WOODS MD  12/29/2023  4:52 PM  Signed       Electrophysiology       Reason for visit:   Chief Complaint   Patient presents with   • Atrial Flutter      HPI   Tobias Little is a 78 y.o. male who comes to the office today, referred by Dr. Nichole to talk about several issues related to his atrial arrhythmias.  He has a history of multiple arrhythmias.  In 2005 he suffered a traumatic head injury with subdural hematomas and developed atrial fibrillation associated with brain surgery.  He did not have any known arrhythmias again until he was in the hospital in 2019 for a knee replacement.  He had recurrent episodes of SVT that extended his hospitalization an additional week.  It is not clear if he had symptoms due to SVT.  Later that year he was taken for catheter ablation where he underwent ablations for atrial fibrillation, typical right atrial flutter and frequent PVCs.  The ablation lesions included pulmonary vein isolation, left atrial roof line, left atrial mitral isthmus line, complex fractionated atrial electrogram ablation, cavotricuspid isthmus ablation and PVC ablation at the [facility] [place].  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      Since then, he has felt relatively well.  He does not get chest pains, shortness of breath, palpitations, lightheadedness or syncope.  ECGs on several  recent visits have shown the same form of atypical atrial flutter that is probably originating in the left atrium.  He is currently wearing a patch monitor, which I presume is being done to evaluate his rate control in this arrhythmia.  He comes to the office questioning the value of antiarrhythmic drug, catheter ablation and left atrial appendage closure procedure.      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  Subsequent phrenic nerve injury   • CARDIAC ELECTROPHYSIOLOGY STUDY AND ABLATION  05/2019    Frequent PVCs from [facility] [place]   • HERNIA REPAIR  07/2021    inguinal hernia's   (X2)   • KNEE SURGERY Right 07/2019    infection   • REPLACEMENT TOTAL KNEE Right 02/2019   • SUBDURAL HEMATOMA EVACUATION VIA CRANIOTOMY      Neurosurgery for subdural hematoma      Allergies:  Patient has no known allergies.    Current Outpatient Medications   Medication Sig Dispense Refill   • amLODIPine (NORVASC) 5 mg tablet Take 5 mg by mouth daily.     • amoxicillin (AMOXIL) 500 mg capsule TAKE 4 CAPSULES BY MOUTH 1 HOUR PRIOR TO APPOINTMENT     • apixaban (ELIQUIS) 5 mg tablet Take 5 mg by mouth 2 (two) times a day.     • cholecalciferol, vitamin D3, (VITAMIN D3 ORAL) Take 5,000 IU by mouth daily.     • coenzyme Q10 (COQ10) 200 mg capsule Take 200 mg by mouth daily.       • latanoprost  (XALATAN) 0.005 % ophthalmic solution Administer 1 drop into both eyes nightly.  6   • lisinopriL 40 mg tablet Take 40 mg by mouth daily.     • metoprolol succinate XL (TOPROL-XL) 100 mg 24 hr tablet Take 100 mg by mouth daily.   3   • rosuvastatin (CRESTOR) 10 mg tablet Take 10 mg by mouth daily.     • vitamin B complex (B COMPLEX ORAL) Take by mouth daily.     • ZINC ORAL Take by mouth daily.       No current facility-administered medications for this visit.     Social History     Socioeconomic History   • Marital status:      Spouse name: None   • Number of children: None   • Years of education: None   • Highest education level: None   Tobacco Use   • Smoking status: Former   • Smokeless tobacco: Former   • Tobacco comments:     Pt Quit Smoking over 40 years ago    Vaping Use   • Vaping Use: Never used   Substance and Sexual Activity   • Alcohol use: Yes     Comment: Occassionally    • Drug use: No   • Sexual activity: Defer     Social Determinants of Health     Food Insecurity: No Food Insecurity (7/29/2023)    Hunger Vital Sign    • Worried About Running Out of Food in the Last Year: Never true    • Ran Out of Food in the Last Year: Never true   Transportation Needs: No Transportation Needs (7/30/2023)    PRAPARE - Transportation    • Lack of Transportation (Medical): No    • Lack of Transportation (Non-Medical): No   Housing Stability: Low Risk  (7/30/2023)    Housing Stability Vital Sign    • Unable to Pay for Housing in the Last Year: No    • Number of Places Lived in the Last Year: 1    • Unstable Housing in the Last Year: No     Family History   Problem Relation Age of Onset   • Heart disease Biological Mother    • Lung cancer Biological Father      Review of Systems   Constitutional: Negative for activity change and fatigue.   HENT: Negative for hearing loss.    Eyes: Negative for visual disturbance.   Respiratory: Negative for cough and wheezing.    Cardiovascular: Negative for chest pain.    Gastrointestinal: Negative for abdominal pain and diarrhea.   Musculoskeletal: Negative for arthralgias, back pain and myalgias.   Skin: Negative for rash.   Neurological: Negative for dizziness and syncope.   Psychiatric/Behavioral: Negative for behavioral problems and confusion.     Vitals:    12/29/23 1505   BP: 112/82   Pulse: 88     Wt Readings from Last 3 Encounters:   12/29/23 105 kg (231 lb 1.6 oz)   12/19/23 105 kg (232 lb)   11/27/23 104 kg (230 lb)     Physical Exam  Constitutional:       General: He is not in acute distress.     Appearance: He is well-developed.   HENT:      Head: Atraumatic.   Eyes:      General: No scleral icterus.  Neck:      Thyroid: No thyromegaly.   Cardiovascular:      Rate and Rhythm: Regular rhythm.      Heart sounds: No murmur heard.  Pulmonary:      Effort: No respiratory distress.      Breath sounds: Normal breath sounds.   Abdominal:      Palpations: Abdomen is soft.      Tenderness: There is no abdominal tenderness.   Musculoskeletal:         General: No deformity.   Skin:     Findings: No rash.   Neurological:      Mental Status: He is alert.      Motor: No abnormal muscle tone.          Labs and test results personally reviewed by me:    Lab Results   Component Value Date    WBC 5.14 07/30/2023    HGB 11.8 (L) 07/30/2023    HCT  07/30/2023      Comment:      Unable to report due to suspected COLD AGGLUTININ     (L) 07/30/2023    CHOL 137 09/18/2023    TRIG 78 09/18/2023    HDL 60 09/18/2023    LDLCALC 61 09/18/2023    ALT 14 09/18/2023    AST 18 09/18/2023     09/18/2023    K 5.1 09/18/2023    CREATININE 1.1 09/18/2023    BUN 14 09/18/2023         ECG personally read and interpreted by me today: Atypical atrial flutter with an average ventricular response of 88 bpm and incomplete right bundle branch block    Coronary artery calcium score 3/16/2016: Composite score of 648       Atypical atrial flutter (CMS/HCC)  He has had multiple arrhythmias as documented  above, but each of his electrocardiograms since his ablation have shown either sinus rhythm or the same atypical atrial flutter that he currently has.  He assures me that he has absolutely no symptoms and no way of telling that he is in an arrhythmia.  We had an extensive discussion today about options for therapy which include catheter ablation targeting this atrial flutter, antiarrhythmic drug therapy or simply rate control.  Given that he is entirely asymptomatic and appears to be rate controlled, the benefits of undergoing catheter ablation at this stage are unknown.  There are no large randomized clinical trials that definitively show an advantage with catheter ablation in this situation.  There are some smaller trials that may suggest benefit in terms of preventing hospitalizations, heart failure and cognitive dysfunction over time.  Given the lack of supportive data, I am comfortable with simply continuing with rate control and anticoagulation as long as his rate control is adequate.    We also had an extensive discussion about stroke prevention.  He is on Eliquis at an appropriate dose and that is the gold standard therapy at this stage.  Left atrial appendage closure is an option and can be valuable for people who have a reason not to take oral anticoagulants.  He does not have any bleeding complications and can tolerate Eliquis.  We spoke about the left atrial appendage closure procedure in a fair amount of detail and after discussion he will remain on Eliquis for now.    In short, I am not making any changes in his medications.  If his rate control proves to be in adequate he can increase rate controlling medications.  If he chooses to consider left atrial appendage closure in the future we will have another discussion about it.  He should return for follow-up with me in a year unless new arrhythmia issues arise.    SVT (supraventricular tachycardia)  He had some form of SVT in 2019.  He underwent  extensive catheter ablation targeted atrial fibrillation, atrial flutter and frequent PVCs.  He has not had any documented SVT since then.    Paroxysmal atrial fibrillation (CMS/HCC)  The history is outlined above.  He remains in atypical atrial flutter with no evidence for atrial fibrillation.    Essential hypertension  His blood pressure is well-controlled on the current medical regimen.        This letter was generated using speech recognition software. Please excuse any typographical errors.       Alan Su MD  12/29/2023

## 2023-12-29 NOTE — PROGRESS NOTES
Electrophysiology       Reason for visit:   Chief Complaint   Patient presents with   • Atrial Flutter      HPI   Tobias Little is a 78 y.o. male who comes to the office today, referred by Dr. Nichole to talk about several issues related to his atrial arrhythmias.  He has a history of multiple arrhythmias.  In 2005 he suffered a traumatic head injury with subdural hematomas and developed atrial fibrillation associated with brain surgery.  He did not have any known arrhythmias again until he was in the hospital in 2019 for a knee replacement.  He had recurrent episodes of SVT that extended his hospitalization an additional week.  It is not clear if he had symptoms due to SVT.  Later that year he was taken for catheter ablation where he underwent ablations for atrial fibrillation, typical right atrial flutter and frequent PVCs.  The ablation lesions included pulmonary vein isolation, left atrial roof line, left atrial mitral isthmus line, complex fractionated atrial electrogram ablation, cavotricuspid isthmus ablation and PVC ablation at the [facility] [place].  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      Since then, he has felt relatively well.  He does not get chest pains, shortness of breath, palpitations, lightheadedness or syncope.  ECGs on several recent visits have shown the same form of atypical atrial flutter that is probably originating in the left atrium.  He is currently wearing a patch monitor, which I presume is being done to evaluate his rate control in this arrhythmia.  He comes to the office questioning the value of antiarrhythmic drug, catheter ablation and left atrial appendage closure procedure.      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  Subsequent phrenic nerve injury   • CARDIAC ELECTROPHYSIOLOGY STUDY AND ABLATION  05/2019    [place] [facility] [place]   • HERNIA REPAIR  07/2021    inguinal hernia's   (X2)   • KNEE SURGERY Right 07/2019    infection   • REPLACEMENT TOTAL KNEE Right 02/2019   • SUBDURAL HEMATOMA EVACUATION VIA CRANIOTOMY      Neurosurgery for subdural hematoma      Allergies:  Patient has no known allergies.    Current Outpatient Medications   Medication Sig Dispense Refill   • amLODIPine (NORVASC) 5 mg tablet Take 5 mg by mouth daily.     • amoxicillin (AMOXIL) 500 mg capsule TAKE 4 CAPSULES BY MOUTH 1 HOUR PRIOR TO APPOINTMENT     • apixaban (ELIQUIS) 5 mg tablet Take 5 mg by mouth 2 (two) times a day.     • cholecalciferol, vitamin D3, (VITAMIN D3 ORAL) Take 5,000 IU by mouth daily.     • coenzyme Q10 (COQ10) 200 mg capsule Take 200 mg by mouth daily.       • latanoprost (XALATAN) 0.005 % ophthalmic solution Administer 1 drop into both eyes nightly.  6   • lisinopriL 40 mg tablet Take 40 mg by mouth daily.     • metoprolol succinate XL (TOPROL-XL) 100 mg 24 hr tablet Take 100 mg by mouth daily.   3   • rosuvastatin (CRESTOR) 10 mg tablet Take 10 mg by mouth daily.     • vitamin B complex (B COMPLEX ORAL) Take by mouth daily.     • ZINC ORAL Take by mouth daily.       No current facility-administered medications for this visit.     Social History     Socioeconomic History   • Marital status:      Spouse name:  None   • Number of children: None   • Years of education: None   • Highest education level: None   Tobacco Use   • Smoking status: Former   • Smokeless tobacco: Former   • Tobacco comments:     Pt Quit Smoking over 40 years ago    Vaping Use   • Vaping Use: Never used   Substance and Sexual Activity   • Alcohol use: Yes     Comment: Occassionally    • Drug use: No   • Sexual activity: Defer     Social Determinants of Health     Food Insecurity: No Food Insecurity (7/29/2023)    Hunger Vital Sign    • Worried About Running Out of Food in the Last Year: Never true    • Ran Out of Food in the Last Year: Never true   Transportation Needs: No Transportation Needs (7/30/2023)    PRAPARE - Transportation    • Lack of Transportation (Medical): No    • Lack of Transportation (Non-Medical): No   Housing Stability: Low Risk  (7/30/2023)    Housing Stability Vital Sign    • Unable to Pay for Housing in the Last Year: No    • Number of Places Lived in the Last Year: 1    • Unstable Housing in the Last Year: No     Family History   Problem Relation Age of Onset   • Heart disease Biological Mother    • Lung cancer Biological Father      Review of Systems   Constitutional: Negative for activity change and fatigue.   HENT: Negative for hearing loss.    Eyes: Negative for visual disturbance.   Respiratory: Negative for cough and wheezing.    Cardiovascular: Negative for chest pain.   Gastrointestinal: Negative for abdominal pain and diarrhea.   Musculoskeletal: Negative for arthralgias, back pain and myalgias.   Skin: Negative for rash.   Neurological: Negative for dizziness and syncope.   Psychiatric/Behavioral: Negative for behavioral problems and confusion.     Vitals:    12/29/23 1505   BP: 112/82   Pulse: 88     Wt Readings from Last 3 Encounters:   12/29/23 105 kg (231 lb 1.6 oz)   12/19/23 105 kg (232 lb)   11/27/23 104 kg (230 lb)     Physical Exam  Constitutional:       General: He is not in acute distress.     Appearance:  He is well-developed.   HENT:      Head: Atraumatic.   Eyes:      General: No scleral icterus.  Neck:      Thyroid: No thyromegaly.   Cardiovascular:      Rate and Rhythm: Regular rhythm.      Heart sounds: No murmur heard.  Pulmonary:      Effort: No respiratory distress.      Breath sounds: Normal breath sounds.   Abdominal:      Palpations: Abdomen is soft.      Tenderness: There is no abdominal tenderness.   Musculoskeletal:         General: No deformity.   Skin:     Findings: No rash.   Neurological:      Mental Status: He is alert.      Motor: No abnormal muscle tone.          Labs and test results personally reviewed by me:    Lab Results   Component Value Date    WBC 5.14 07/30/2023    HGB 11.8 (L) 07/30/2023    HCT  07/30/2023      Comment:      Unable to report due to suspected COLD AGGLUTININ     (L) 07/30/2023    CHOL 137 09/18/2023    TRIG 78 09/18/2023    HDL 60 09/18/2023    LDLCALC 61 09/18/2023    ALT 14 09/18/2023    AST 18 09/18/2023     09/18/2023    K 5.1 09/18/2023    CREATININE 1.1 09/18/2023    BUN 14 09/18/2023         ECG personally read and interpreted by me today: Atypical atrial flutter with an average ventricular response of 88 bpm and incomplete right bundle branch block    Coronary artery calcium score 3/16/2016: Composite score of 648       Atypical atrial flutter (CMS/HCC)  He has had multiple arrhythmias as documented above, but each of his electrocardiograms since his ablation have shown either sinus rhythm or the same atypical atrial flutter that he currently has.  He assures me that he has absolutely no symptoms and no way of telling that he is in an arrhythmia.  We had an extensive discussion today about options for therapy which include catheter ablation targeting this atrial flutter, antiarrhythmic drug therapy or simply rate control.  Given that he is entirely asymptomatic and appears to be rate controlled, the benefits of undergoing catheter ablation at this  stage are unknown.  There are no large randomized clinical trials that definitively show an advantage with catheter ablation in this situation.  There are some smaller trials that may suggest benefit in terms of preventing hospitalizations, heart failure and cognitive dysfunction over time.  Given the lack of supportive data, I am comfortable with simply continuing with rate control and anticoagulation as long as his rate control is adequate.    We also had an extensive discussion about stroke prevention.  He is on Eliquis at an appropriate dose and that is the gold standard therapy at this stage.  Left atrial appendage closure is an option and can be valuable for people who have a reason not to take oral anticoagulants.  He does not have any bleeding complications and can tolerate Eliquis.  We spoke about the left atrial appendage closure procedure in a fair amount of detail and after discussion he will remain on Eliquis for now.    In short, I am not making any changes in his medications.  If his rate control proves to be in adequate he can increase rate controlling medications.  If he chooses to consider left atrial appendage closure in the future we will have another discussion about it.  He should return for follow-up with me in a year unless new arrhythmia issues arise.    SVT (supraventricular tachycardia)  He had some form of SVT in 2019.  He underwent extensive catheter ablation targeted atrial fibrillation, atrial flutter and frequent PVCs.  He has not had any documented SVT since then.    Paroxysmal atrial fibrillation (CMS/HCC)  The history is outlined above.  He remains in atypical atrial flutter with no evidence for atrial fibrillation.    Essential hypertension  His blood pressure is well-controlled on the current medical regimen.        This letter was generated using speech recognition software. Please excuse any typographical errors.       Alan Su MD  12/29/2023

## 2024-01-16 NOTE — H&P (VIEW-ONLY)
2024  Dakotah Villalba MD  [address]  [place] [address]    Troy Benoit MD    [address] [place] [address]      Re:  TOBIAS VALVERDE  :  1945      Dear Dakotah:    It was my pleasure to see Tobias Valverde in the cardiovascular office today.  We will follow Tobias for  subclinical coronary artery disease with an Agatston score of 648 with two vessel involvement, hypertension and polygenic hypercholesterolemia.    He  underwent right total knee replacement  at the [facility].  This was complicated by postoperative paroxysmal SVT.  He was discharged on flecainide 100 mg twice daily and Toprol- mg daily.  Ultimately he underwent a Medtronic loop recorder and a subsequent ablation for atrial tachyarrhythmias.  He had complications with infection of the Medtronic loop recorder and his recently implanted right total knee.  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  He is now in rate controlled atrial flutter and on a combination of Toprol-XL and Eliquis.     Tobias reported to this office after visiting his ophthalmologist.  Apparently he has had some bleeding involving his right and left eyes.  There is some concern about him remaining on Eliquis.  We will contact his ophthalmologist to see if we need to stop the Eliquis.    This led to a rediscussion of his atrial flutter and anticoagulation.  He has not had any procedures on his atrial flutter.  There has been no cardioversion and there has been no ablation.      During sexual relations apparently his heart races.  We had him  wear a Zio patch for 1 week.  Certainly he may need to entertain ablation of his atrial flutter.  The Zio patch showed that his rhythm was 100% atrial flutter.  His heart rate ranged from .  There was no other ectopy.       His secondary issue is anticoagulation is now becoming an issue related to his  retinopathy and retinal bleeding.  Will contact his ophthalmologist to see what procedures are being planned.  Obviously he would be a good watchman candidate so we will refer him to electrophysiology.  He was seen by Dr. Su who believes that no further procedures are necessary at this time unless he has significant bleeding.      He was followed by Dr. Juan C Lynn at [place].  Recent discussions indicate that he is satisfied with rate control of his atrial flutter and anticoagulation with the use of Eliquis.  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      At this time we discussed removal of his Medtronic loop recorder as it is not serving any real purpose.  At this time he will remain on Eliquis and not pursue a watchman device. He does have a remote history of subdural hematoma in 2008.     His blood pressure is stable today at 116/84   we reviewed his blood pressure recordings over the past few months.  He will continue with the use of lisinopril 40 mg at bedtime and amlodipine at a relatively low dose of 2.5 mg in the morning.   He is no longer on hydrochlorothiazide as he developed gout with the thiazide diuretic.  His last echocardiogram in 2021 demonstrated normal left ventricular size and preserved systolic function.    His recent blood work 9/18/2023 revealed a creatinine of 1.1 and a GFR greater than 60.  Tobias underwent some recent blood work from his hematologist and was noted to have a creatinine of 1.4 and a GFR 52.  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  His repeat study showed a creatinine of 1.05.    We reviewed his prevention program in detail.  His last lipid panel is noted below and is excellent.  Presently, he is on Crestor 10 mg daily.     There is evidence of insulin resistance.  We recommended a weight reduction diet.  His goal weight is 220 pounds.  His present  weight is 232 pounds.  We discussed a low-carbohydrate, low-saturated fat Mediterranean Diet.      He is also been noticing some dyspnea on exertion.  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  He is in chronic atrial flutter with a baseline heart rate of 90.  We  scheduled him for stress echo to assess his overall endurance, heart rate and blood pressure response to exercise.  His study was negative for ischemia.      He also carries a history of coronary artery disease and we will rule out any significant coronary ischemia.  Unfortunately has developed significant left knee arthritis and will not be able to undergo a stress echo.  He is now will scheduled for a PET scan to rule out ischemia.          PAST MEDICAL HISTORY:   1.  Subclinical coronary artery disease, coronary calcium score 648 with two vessel involvement including the LAD and RCA, March 2016.  Stress echo was performed in April 2016 which was negative for myocardial ischemia or scar, normal left ventricular size, preserved systolic function.   2.  dyslipidemia consistent with mild polygenic hypercholesterolemia, total cholesterol 198, , HDL 65, triglycerides 52, apoB 92, LDL-P 1496, normal LP(a)  3.  MTHFR polymorphism with hyperhomocystinemia  4.  endothelial dysfunction  5.  insulin resistance  6.  Hypertension  7.  abnormal EKG with incomplete right bundle branch block, frequent PVC's  8.  subdural hematoma in 2008 secondary to traumatic injury head injury secondary to a steel pipe.  Complicated by paroxysmal atrial fibrillation.  Now on Eliquis for persistent atrial flutter.  9.  Dilated aortic root at 4.4 cm.  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  Follow at [facility].  11.  Postoperative PSVT.  12.  Status post ablation 2019 for atrial fibrillation and PSVT.  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  Now in persistent atrial flutter with brief periods of more rapid atrial  fibrillation.  13.  Status post ablation of ventricular arrhythmia LVOT.  2019     Past Medical History:   Diagnosis Date   • Arthritis    • Atrial fibrillation (CMS/HCC)    • Coronary artery disease    • Homozygous for [id number] polymorphism of MTHFR (CMS/HCC)    • Hyperlipidemia    • Hypertension    • Infection associated with internal knee prosthesis     • Obesity    • Partial small bowel obstruction (CMS/HCC)    • Phrenic nerve paralysis     resolved   • Renal cyst    • Retinal hemorrhage    • SVT (supraventricular tachycardia)    • Traumatic subdural hematoma (CMS/HCC) 2008     Past Surgical History:   Procedure Laterality Date   • APPENDECTOMY     • CARDIAC ELECTROPHYSIOLOGY STUDY AND ABLATION  05/2019    Atrial fibrillation: PVI, roof, lateral mitral isthmus, CFAE and CTI.  Subsequent phrenic nerve injury   • CARDIAC ELECTROPHYSIOLOGY STUDY AND ABLATION  05/2019    [place] [facility] [place]ERNIA REPAIR  07/2021    inguinal hernia's   (X2)   • KNEE SURGERY Right 07/2019    infection   • REPLACEMENT TOTAL KNEE Right 02/2019   • SUBDURAL HEMATOMA EVACUATION VIA CRANIOTOMY      Neurosurgery for subdural hematoma      CURRENT MEDICATIONS:     Current Outpatient Medications:   •  amLODIPine (NORVASC) 5 mg tablet, Take 5 mg by mouth daily., Disp: , Rfl:   •  amoxicillin (AMOXIL) 500 mg capsule, TAKE 4 CAPSULES BY MOUTH 1 HOUR PRIOR TO APPOINTMENT, Disp: , Rfl:   •  apixaban (ELIQUIS) 5 mg tablet, Take 5 mg by mouth 2 (two) times a day., Disp: , Rfl:   •  cholecalciferol, vitD3,/vit K2 (VITAMIN D3-VITAMIN K2 ORAL), Take by mouth daily., Disp: , Rfl:   •  coenzyme Q10 (COQ10) 200 mg capsule, Take 200 mg by mouth daily.  , Disp: , Rfl:   •  latanoprost (XALATAN) 0.005 % ophthalmic solution, Administer 1 drop into both eyes nightly., Disp: , Rfl: 6  •  lisinopriL 40 mg tablet, Take 40 mg by mouth daily., Disp: , Rfl:   •  metoprolol succinate XL (TOPROL-XL) 100 mg 24 hr tablet, Take 100 mg by mouth daily. , Disp: , Rfl:  3  •  rosuvastatin (CRESTOR) 10 mg tablet, Take 10 mg by mouth daily., Disp: , Rfl:   •  vitamin B complex (B COMPLEX ORAL), Take by mouth daily., Disp: , Rfl:   •  ZINC ORAL, Take by mouth daily., Disp: , Rfl:     SUPPLEMENTS:  Coenzyme-Q10 100 mg daily, vitamin-D 5495-3872 international units daily, omega-3 fish oil 1000 mg daily, multiple vitamin.    ALLERGIES:  No known drug allergies.    FAMILY HISTORY:  Father  young at age 55 of lung cancer.  Mother has a pacemaker.    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  His wife's name is Jaye.  They have three children.  He has seven grandchildren.  He smoked 38 years ago for five years.  He rarely drinks alcohol.  He has an active lifestyle.  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    REVIEW OF SYSTEMS: Tobias is now in persistent atrial flutter with rate control.    PHYSICAL EXAMINATION:  Vitals:    24 0902   BP: 116/84   Pulse: 95   SpO2: 99%     Wt Readings from Last 3 Encounters:   24 105 kg (232 lb)   23 105 kg (231 lb 1.6 oz)   23 105 kg (232 lb)     BP Readings from Last 3 Encounters:   24 116/84   23 112/82   23 124/80     HEENT:  Unremarkable.  Neck:  Supple, no JVD.  Lungs:  Clear to auscultation and percussion.  Cardiac:  Regular rate and rhythm.  Abdomen:  Soft, bowel sounds present, no organomegaly.  Extremities:  No edema, pulses intact.  Skin:  Warm and dry.  Neuro:  Alert and oriented X 3.    LABORATORY DATA:      EKG:  Atrial Flutter rate controlled.    Coronary calcium score 2016:  648 with two vessel involvement.      Stress echo was negative for myocardial ischemia or scar, normal left ventricular size, preserved systolic function.    Myocardial perfusion scan 2019: Normal    Echocardiogram 2019: Normal left ventricular size preserved function.  Mild mitral and tricuspid regurgitation.   Ascending aorta measured 4.2 cm.    Echocardiogram September 2021:  • The left ventricle is normal in size. Normal left ventricular wall thickness. There are no segmental wall motion abnormalities. Normal left ventricular ejection fraction. The left ventricular ejection fraction by visual estimate is 65% to 70%.  • Unable to assess LV diastolic function due to arrhythmia.  • The right ventricle is normal in size. There is normal function of the right ventricle.  • There is no mitral regurgitation. There is no mitral stenosis.  • There is no aortic stenosis. There is mild aortic regurgitation.  • There is trace tricuspid regurgitation.  • The aortic root is mildly enlarged. The aorta measures 4.2 cm at the sinus of Valsalva. The proximal segment of the ascending aorta is mildly enlarged. The proximal segment of the ascending aorta measures 4.0 cm.    Lipid panel:   Component      Latest Ref Rng 12/29/2021 7/21/2022 9/18/2023   Triglycerides      <150 mg/dL 80  45  78    Cholesterol      <=200 mg/dL 149  152  137    HDL      >=40 mg/dL 62  62  60    LDL Calculated      <=100 mg/dL 71  81  61    Non-HDL, Calculated      mg/dL 87  90  77    RISK      <=5.0  2.4  2.5  2.3        IMPRESSIONS/RECOMMENDATIONS:  1. Coronary artery disease.  The patient's stress test was negative for ischemia.  He is not on aspirin as he is on Eliquis.  He is scheduled for a PET scan.  2. Hypertension.  Continue Toprol-XL and lisinopril and amlodipine.  3. Polygenic hypercholesterolemia.  The patient will continue his Crestor  10 mg daily.  Our goal is an LDL below 70.  Needs repeat lipid panel.  4. MTHFR polymorphism.  Continue B-complex with L5 methyl folate.  5. Insulin resistance.  Continue low carbohydrate low saturated fat Mediterranean diet and exercise program.  6. Obesity.   We discussed a low-carbohydrate, low-saturated fat Mediterranean Diet and an exercise walking program.  His goal weight is under 220 pounds.  7.          Persistent atrial flutter.  Status post A. fib ablation complicated by phrenic nerve paralysis.  Patient is now resigned to rate control and anticoagulation for his atrial flutter.  He was followed by Dr. Juan C Lynn at [facility].  Rate is seen by Dr. Su who does not feel he will benefit from ablation of his atrial flutter.  He should remain on Eliquis.  8.         History of subdural hematoma.  This was secondary to head trauma and was not spontaneous.  He is on Eliquis now for his atrial flutter.  9.         History of phrenic nerve injury with diaphragmatic paralysis.  Resolved.  10.       History of total knee replacement.  Followed [facility].  He may need a left total knee replacement.  11.       History of cold agglutinins.  Followed [facility].  12.       Chronic kidney disease stage IIIa.  Patient's recent creatinine is 1.4 with a GFR of 52.  Repeat creatinine was 1.05  13.       Dilated aortic root.  The patient's recent echocardiogram demonstrated mild dilatation of the sinus of Valsalva at 4.2 cm and ascending thoracic aorta at 4.0 cm.  A CT angiogram of the chest 2019 demonstrated normal aortic root at 3.9 cm.  14.       Dyspnea on exertion.  The patient does have a history of phrenic nerve paralysis as well as chronic atrial flutter with controlled ventricular response.  He underwent Zio patch monitoring for 8 to 14 days.  Report as above.    Summary:.      Tobias is demonstrating cardiovascular stability.      We reviewed his cardiac history.  He now has chronic atrial flutter rate controlled with Toprol-XL and anticoagulated with Eliquis.    His blood pressures been reasonably well controlled with the use of amlodipine and lisinopril.    His weight is 232 pounds with BMI 32.  He was counseled on low carbohydrate low saturated fat Mediterranean diet.    He has noticed some dyspnea on exertion.  He has a history phrenic nerve paralysis.  He will  undergo a PET scan to evaluate for ischemia.    He is now suffering from left knee arthritis.  He is contemplating left total knee replacement.  This is being placed on hold.    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  Will discuss discontinuing his Eliquis.  With his ophthalmologist Dr Harley Carranza.  Apparently she requested bilateral carotid ultrasounds to rule out significant carotid arterial sclerosis.  She feels that the Eliquis may be aggravating Valsalva induced dot hemorrhages.  Ideally we would like to move him off of Eliquis.  EP does not feel he would benefit from the Watchman at this time.  He will continue on present therapy.    This was a 30 minute patient encounter with greater than 50% time spent in care coordination and counseling.       Sincerely,    BASSEM Sales    01/18/24

## 2024-01-16 NOTE — PROGRESS NOTES
2024  Dakotah Villalba MD  [address]  [place] [address]    Troy Benoit MD    [address] [place] [address]      Re:  TOBIAS VALVERDE  :  1945      Dear Dakotah:    It was my pleasure to see Tobias Valverde in the cardiovascular office today.  We will follow Tobias for  subclinical coronary artery disease with an Agatston score of 648 with two vessel involvement, hypertension and polygenic hypercholesterolemia.    He  underwent right total knee replacement  at the [facility].  This was complicated by postoperative paroxysmal SVT.  He was discharged on flecainide 100 mg twice daily and Toprol- mg daily.  Ultimately he underwent a Medtronic loop recorder and a subsequent ablation for atrial tachyarrhythmias.  He had complications with infection of the Medtronic loop recorder and his recently implanted right total knee.  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  He is now in rate controlled atrial flutter and on a combination of Toprol-XL and Eliquis.     Tobias reported to this office after visiting his ophthalmologist.  Apparently he has had some bleeding involving his right and left eyes.  There is some concern about him remaining on Eliquis.  We will contact his ophthalmologist to see if we need to stop the Eliquis.    This led to a rediscussion of his atrial flutter and anticoagulation.  He has not had any procedures on his atrial flutter.  There has been no cardioversion and there has been no ablation.      During sexual relations apparently his heart races.  We had him  wear a Zio patch for 1 week.  Certainly he may need to entertain ablation of his atrial flutter.  The Zio patch showed that his rhythm was 100% atrial flutter.  His heart rate ranged from .  There was no other ectopy.       His secondary issue is anticoagulation is now becoming an issue related to his  retinopathy and retinal bleeding.  Will contact his ophthalmologist to see what procedures are being planned.  Obviously he would be a good watchman candidate so we will refer him to electrophysiology.  He was seen by Dr. Su who believes that no further procedures are necessary at this time unless he has significant bleeding.      He was followed by Dr. Juan C Lynn at [place].  Recent discussions indicate that he is satisfied with rate control of his atrial flutter and anticoagulation with the use of Eliquis.  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      At this time we discussed removal of his Medtronic loop recorder as it is not serving any real purpose.  At this time he will remain on Eliquis and not pursue a watchman device. He does have a remote history of subdural hematoma in 2008.     His blood pressure is stable today at 116/84   we reviewed his blood pressure recordings over the past few months.  He will continue with the use of lisinopril 40 mg at bedtime and amlodipine at a relatively low dose of 2.5 mg in the morning.   He is no longer on hydrochlorothiazide as he developed gout with the thiazide diuretic.  His last echocardiogram in 2021 demonstrated normal left ventricular size and preserved systolic function.    His recent blood work 9/18/2023 revealed a creatinine of 1.1 and a GFR greater than 60.  Tobias underwent some recent blood work from his hematologist and was noted to have a creatinine of 1.4 and a GFR 52.  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  His repeat study showed a creatinine of 1.05.    We reviewed his prevention program in detail.  His last lipid panel is noted below and is excellent.  Presently, he is on Crestor 10 mg daily.     There is evidence of insulin resistance.  We recommended a weight reduction diet.  His goal weight is 220 pounds.  His present  weight is 232 pounds.  We discussed a low-carbohydrate, low-saturated fat Mediterranean Diet.      He is also been noticing some dyspnea on exertion.  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  He is in chronic atrial flutter with a baseline heart rate of 90.  We  scheduled him for stress echo to assess his overall endurance, heart rate and blood pressure response to exercise.  His study was negative for ischemia.      He also carries a history of coronary artery disease and we will rule out any significant coronary ischemia.  Unfortunately has developed significant left knee arthritis and will not be able to undergo a stress echo.  He is now will scheduled for a PET scan to rule out ischemia.          PAST MEDICAL HISTORY:   1.  Subclinical coronary artery disease, coronary calcium score 648 with two vessel involvement including the LAD and RCA, March 2016.  Stress echo was performed in April 2016 which was negative for myocardial ischemia or scar, normal left ventricular size, preserved systolic function.   2.  dyslipidemia consistent with mild polygenic hypercholesterolemia, total cholesterol 198, , HDL 65, triglycerides 52, apoB 92, LDL-P 1496, normal LP(a)  3.  MTHFR polymorphism with hyperhomocystinemia  4.  endothelial dysfunction  5.  insulin resistance  6.  Hypertension  7.  abnormal EKG with incomplete right bundle branch block, frequent PVC's  8.  subdural hematoma in 2008 secondary to traumatic injury head injury secondary to a steel pipe.  Complicated by paroxysmal atrial fibrillation.  Now on Eliquis for persistent atrial flutter.  9.  Dilated aortic root at 4.4 cm.  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  Follow at the [facility].  11.  Postoperative PSVT.  12.  Status post ablation 2019 for atrial fibrillation and PSVT.  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  Now in persistent atrial flutter with brief periods of more rapid atrial  fibrillation.  13.  Status post ablation of ventricular arrhythmia LVOT.  2019     Past Medical History:   Diagnosis Date   • Arthritis    • Atrial fibrillation (CMS/HCC)    • Coronary artery disease    • Homozygous for C677T polymorphism of MTHFR (CMS/HCC)    • Hyperlipidemia    • Hypertension    • Infection associated with internal knee prosthesis     • Obesity    • Partial small bowel obstruction (CMS/HCC)    • Phrenic nerve paralysis     resolved   • Renal cyst    • Retinal hemorrhage    • SVT (supraventricular tachycardia)    • Traumatic subdural hematoma (CMS/HCC) 2008     Past Surgical History:   Procedure Laterality Date   • APPENDECTOMY     • CARDIAC ELECTROPHYSIOLOGY STUDY AND ABLATION  05/2019    Atrial fibrillation: PVI, roof, lateral mitral isthmus, CFAE and CTI.  Subsequent phrenic nerve injury   • CARDIAC ELECTROPHYSIOLOGY STUDY AND ABLATION  05/2019    Frequent PVCs from [facility]   • HERNIA REPAIR  07/2021    inguinal hernia's   (X2)   • KNEE SURGERY Right 07/2019    infection   • REPLACEMENT TOTAL KNEE Right 02/2019   • SUBDURAL HEMATOMA EVACUATION VIA CRANIOTOMY      Neurosurgery for subdural hematoma      CURRENT MEDICATIONS:     Current Outpatient Medications:   •  amLODIPine (NORVASC) 5 mg tablet, Take 5 mg by mouth daily., Disp: , Rfl:   •  amoxicillin (AMOXIL) 500 mg capsule, TAKE 4 CAPSULES BY MOUTH 1 HOUR PRIOR TO APPOINTMENT, Disp: , Rfl:   •  apixaban (ELIQUIS) 5 mg tablet, Take 5 mg by mouth 2 (two) times a day., Disp: , Rfl:   •  cholecalciferol, vitD3,/vit K2 (VITAMIN D3-VITAMIN K2 ORAL), Take by mouth daily., Disp: , Rfl:   •  coenzyme Q10 (COQ10) 200 mg capsule, Take 200 mg by mouth daily.  , Disp: , Rfl:   •  latanoprost (XALATAN) 0.005 % ophthalmic solution, Administer 1 drop into both eyes nightly., Disp: , Rfl: 6  •  lisinopriL 40 mg tablet, Take 40 mg by mouth daily., Disp: , Rfl:   •  metoprolol succinate XL (TOPROL-XL) 100 mg 24 hr tablet, Take 100 mg by mouth daily. , Disp: , Rfl:  3  •  rosuvastatin (CRESTOR) 10 mg tablet, Take 10 mg by mouth daily., Disp: , Rfl:   •  vitamin B complex (B COMPLEX ORAL), Take by mouth daily., Disp: , Rfl:   •  ZINC ORAL, Take by mouth daily., Disp: , Rfl:     SUPPLEMENTS:  Coenzyme-Q10 100 mg daily, vitamin-D 7497-3319 international units daily, omega-3 fish oil 1000 mg daily, multiple vitamin.    ALLERGIES:  No known drug allergies.    FAMILY HISTORY:  Father  young at age 55 of lung cancer.  Mother has a pacemaker.    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  His wife's name is Jaye.  They have three children.  He has seven grandchildren.  He smoked 38 years ago for five years.  He rarely drinks alcohol.  He has an active lifestyle.  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    REVIEW OF SYSTEMS: Tobias is now in persistent atrial flutter with rate control.    PHYSICAL EXAMINATION:  Vitals:    24 0902   BP: 116/84   Pulse: 95   SpO2: 99%     Wt Readings from Last 3 Encounters:   24 105 kg (232 lb)   23 105 kg (231 lb 1.6 oz)   23 105 kg (232 lb)     BP Readings from Last 3 Encounters:   24 116/84   23 112/82   23 124/80     HEENT:  Unremarkable.  Neck:  Supple, no JVD.  Lungs:  Clear to auscultation and percussion.  Cardiac:  Regular rate and rhythm.  Abdomen:  Soft, bowel sounds present, no organomegaly.  Extremities:  No edema, pulses intact.  Skin:  Warm and dry.  Neuro:  Alert and oriented X 3.    LABORATORY DATA:      EKG:  Atrial Flutter rate controlled.    Coronary calcium score 2016:  648 with two vessel involvement.      Stress echo was negative for myocardial ischemia or scar, normal left ventricular size, preserved systolic function.    Myocardial perfusion scan 2019: Normal    Echocardiogram 2019: Normal left ventricular size preserved function.  Mild mitral and tricuspid regurgitation.   Ascending aorta measured 4.2 cm.    Echocardiogram September 2021:  • The left ventricle is normal in size. Normal left ventricular wall thickness. There are no segmental wall motion abnormalities. Normal left ventricular ejection fraction. The left ventricular ejection fraction by visual estimate is 65% to 70%.  • Unable to assess LV diastolic function due to arrhythmia.  • The right ventricle is normal in size. There is normal function of the right ventricle.  • There is no mitral regurgitation. There is no mitral stenosis.  • There is no aortic stenosis. There is mild aortic regurgitation.  • There is trace tricuspid regurgitation.  • The aortic root is mildly enlarged. The aorta measures 4.2 cm at the sinus of Valsalva. The proximal segment of the ascending aorta is mildly enlarged. The proximal segment of the ascending aorta measures 4.0 cm.    Lipid panel:   Component      Latest Ref Rng 12/29/2021 7/21/2022 9/18/2023   Triglycerides      <150 mg/dL 80  45  78    Cholesterol      <=200 mg/dL 149  152  137    HDL      >=40 mg/dL 62  62  60    LDL Calculated      <=100 mg/dL 71  81  61    Non-HDL, Calculated      mg/dL 87  90  77    RISK      <=5.0  2.4  2.5  2.3        IMPRESSIONS/RECOMMENDATIONS:  1. Coronary artery disease.  The patient's stress test was negative for ischemia.  He is not on aspirin as he is on Eliquis.  He is scheduled for a PET scan.  2. Hypertension.  Continue Toprol-XL and lisinopril and amlodipine.  3. Polygenic hypercholesterolemia.  The patient will continue his Crestor  10 mg daily.  Our goal is an LDL below 70.  Needs repeat lipid panel.  4. MTHFR polymorphism.  Continue B-complex with L5 methyl folate.  5. Insulin resistance.  Continue low carbohydrate low saturated fat Mediterranean diet and exercise program.  6. Obesity.   We discussed a low-carbohydrate, low-saturated fat Mediterranean Diet and an exercise walking program.  His goal weight is under 220 pounds.  7.          Persistent atrial flutter.  Status post A. fib ablation complicated by phrenic nerve paralysis.  Patient is now resigned to rate control and anticoagulation for his atrial flutter.  He was followed by Dr. Juan C Lynn at [facility].  Rate is seen by Dr. Su who does not feel he will benefit from ablation of his atrial flutter.  He should remain on Eliquis.  8.         History of subdural hematoma.  This was secondary to head trauma and was not spontaneous.  He is on Eliquis now for his atrial flutter.  9.         History of phrenic nerve injury with diaphragmatic paralysis.  Resolved.  10.       History of total knee replacement.  Followed [facility].  He may need a left total knee replacement.  11.       History of cold agglutinins.  Followed [facility].  12.       Chronic kidney disease stage IIIa.  Patient's recent creatinine is 1.4 with a GFR of 52.  Repeat creatinine was 1.05  13.       Dilated aortic root.  The patient's recent echocardiogram demonstrated mild dilatation of the sinus of Valsalva at 4.2 cm and ascending thoracic aorta at 4.0 cm.  A CT angiogram of the chest 2019 demonstrated normal aortic root at 3.9 cm.  14.       Dyspnea on exertion.  The patient does have a history of phrenic nerve paralysis as well as chronic atrial flutter with controlled ventricular response.  He underwent Zio patch monitoring for 8 to 14 days.  Report as above.    Summary:.      Tobias is demonstrating cardiovascular stability.      We reviewed his cardiac history.  He now has chronic atrial flutter rate controlled with Toprol-XL and anticoagulated with Eliquis.    His blood pressures been reasonably well controlled with the use of amlodipine and lisinopril.    His weight is 232 pounds with BMI 32.  He was counseled on low carbohydrate low saturated fat Mediterranean diet.    He has noticed some dyspnea on exertion.  He has a history phrenic nerve paralysis.  He will  undergo a PET scan to evaluate for ischemia.    He is now suffering from left knee arthritis.  He is contemplating left total knee replacement.  This is being placed on hold.    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  Will discuss discontinuing his Eliquis.  With his ophthalmologist Dr Harley Carranza.  Apparently she requested bilateral carotid ultrasounds to rule out significant carotid arterial sclerosis.  She feels that the Eliquis may be aggravating Valsalva induced dot hemorrhages.  Ideally we would like to move him off of Eliquis.  EP does not feel he would benefit from the Watchman at this time.  He will continue on present therapy.    This was a 30 minute patient encounter with greater than 50% time spent in care coordination and counseling.       Sincerely,    BASSEM Sales    01/18/24

## 2024-01-17 ENCOUNTER — TELEPHONE (OUTPATIENT)
Dept: CARDIOLOGY | Facility: CLINIC | Age: 79
End: 2024-01-17
Payer: COMMERCIAL

## 2024-01-17 NOTE — TELEPHONE ENCOUNTER
Spoke with patient and he did not get any labs drawn since 11/2023. He said this visit was to review his Zio results. He will get labs done tomorrow after his visit in case additional labs are ordered. Patient is aware that outstanding lab work requires him to be fasting.

## 2024-01-18 ENCOUNTER — OFFICE VISIT (OUTPATIENT)
Dept: CARDIOLOGY | Facility: CLINIC | Age: 79
End: 2024-01-18
Payer: COMMERCIAL

## 2024-01-18 VITALS
OXYGEN SATURATION: 99 % | HEART RATE: 95 BPM | HEIGHT: 70 IN | WEIGHT: 232 LBS | DIASTOLIC BLOOD PRESSURE: 84 MMHG | SYSTOLIC BLOOD PRESSURE: 116 MMHG | BODY MASS INDEX: 33.21 KG/M2

## 2024-01-18 DIAGNOSIS — I25.10 CORONARY ARTERY DISEASE INVOLVING NATIVE HEART, UNSPECIFIED VESSEL OR LESION TYPE, UNSPECIFIED WHETHER ANGINA PRESENT: Primary | ICD-10-CM

## 2024-01-18 PROCEDURE — 3079F DIAST BP 80-89 MM HG: CPT | Performed by: NURSE PRACTITIONER

## 2024-01-18 PROCEDURE — 3074F SYST BP LT 130 MM HG: CPT | Performed by: NURSE PRACTITIONER

## 2024-01-18 PROCEDURE — 99214 OFFICE O/P EST MOD 30 MIN: CPT | Performed by: NURSE PRACTITIONER

## 2024-01-18 PROCEDURE — 3008F BODY MASS INDEX DOCD: CPT | Performed by: NURSE PRACTITIONER

## 2024-01-23 ENCOUNTER — TELEPHONE (OUTPATIENT)
Dept: CARDIOLOGY | Facility: CLINIC | Age: 79
End: 2024-01-23
Payer: COMMERCIAL

## 2024-01-25 ENCOUNTER — HOSPITAL ENCOUNTER (OUTPATIENT)
Dept: CARDIOLOGY | Facility: CLINIC | Age: 79
Discharge: HOME | End: 2024-01-25
Attending: NURSE PRACTITIONER
Payer: COMMERCIAL

## 2024-01-25 DIAGNOSIS — I25.10 CORONARY ARTERY DISEASE INVOLVING NATIVE HEART, UNSPECIFIED VESSEL OR LESION TYPE, UNSPECIFIED WHETHER ANGINA PRESENT: ICD-10-CM

## 2024-01-25 LAB
CORONARY BLOOD FLOW AT REST: 0.8
CORONARY BLOOD FLOW WITH STRESS: 1.2
CTDI: 17.11 MGY
CV MESA RISK PERCENTILE: 78
CV NM PET RUBIDIUM REST DOSE: 25.1 MCI
CV NM PET RUBIDIUM STRESS DOSE: 25 MCI
DOSE LENGTH PRODUCT: 305 MGYCM
EJECTION FRACTION RESERVE: 0 %
GLOBAL BLOOD FLOW RESERVE: 1.5
MLH CALCIUM SCORE - LAD: 372
MLH CALCIUM SCORE - LCX: 93
MLH CALCIUM SCORE - LM: 44
MLH CALCIUM SCORE -RCA: 582
MLH CALCIUM SCORE TOTAL AGATSTON: 1091
MLH CV NM REST ADMIN DATE: NORMAL MM/DD/YYYY
MLH CV NM REST ADMIN TIME: 934 HR:MIN
MLH CV NM STRESS ADMIN DATE: NORMAL MM/DD/YYYY
MLH CV NM STRESS ADMIN TIME: 946 HR:MIN
NUC REST EJECTION FRACTION: 51 %
NUC STRESS EJECTION FRACTION: 51 %
STRESS BASELINE BP: NORMAL MMHG
STRESS BASELINE HR: 78 BPM
STRESS PERCENT HR: 61 %
STRESS POST PEAK BP: NORMAL MMHG
STRESS POST PEAK HR: 88 BPM
STRESS TARGET HR: 122 BPM

## 2024-01-25 PROCEDURE — 78431 MYOCRD IMG PET RST&STRS CT: CPT | Performed by: STUDENT IN AN ORGANIZED HEALTH CARE EDUCATION/TRAINING PROGRAM

## 2024-01-25 PROCEDURE — A9555 RB82 RUBIDIUM: HCPCS | Performed by: STUDENT IN AN ORGANIZED HEALTH CARE EDUCATION/TRAINING PROGRAM

## 2024-01-25 PROCEDURE — 93015 CV STRESS TEST SUPVJ I&R: CPT | Performed by: STUDENT IN AN ORGANIZED HEALTH CARE EDUCATION/TRAINING PROGRAM

## 2024-01-25 PROCEDURE — 78434 AQMBF PET REST & RX STRESS: CPT | Performed by: STUDENT IN AN ORGANIZED HEALTH CARE EDUCATION/TRAINING PROGRAM

## 2024-01-25 RX ORDER — REGADENOSON 0.08 MG/ML
0.4 INJECTION, SOLUTION INTRAVENOUS ONCE
Status: COMPLETED | OUTPATIENT
Start: 2024-01-25 | End: 2024-01-25

## 2024-01-25 RX ADMIN — REGADENOSON 0.4 MG: 0.08 INJECTION, SOLUTION INTRAVENOUS at 09:45

## 2024-01-26 DIAGNOSIS — I25.10 CORONARY ARTERY DISEASE INVOLVING NATIVE CORONARY ARTERY OF NATIVE HEART WITHOUT ANGINA PECTORIS: Primary | ICD-10-CM

## 2024-01-29 ENCOUNTER — APPOINTMENT (OUTPATIENT)
Dept: LAB | Facility: CLINIC | Age: 79
End: 2024-01-29
Attending: NURSE PRACTITIONER
Payer: COMMERCIAL

## 2024-01-29 ENCOUNTER — TELEPHONE (OUTPATIENT)
Dept: CARDIOLOGY | Facility: HOSPITAL | Age: 79
End: 2024-01-29
Payer: COMMERCIAL

## 2024-01-29 DIAGNOSIS — I25.10 CORONARY ARTERY DISEASE INVOLVING NATIVE CORONARY ARTERY OF NATIVE HEART WITHOUT ANGINA PECTORIS: ICD-10-CM

## 2024-01-29 LAB
ANION GAP SERPL CALC-SCNC: 8 MEQ/L (ref 3–15)
BUN SERPL-MCNC: 19 MG/DL (ref 7–25)
CALCIUM SERPL-MCNC: 9.2 MG/DL (ref 8.6–10.3)
CHLORIDE SERPL-SCNC: 101 MEQ/L (ref 98–107)
CO2 SERPL-SCNC: 29 MEQ/L (ref 21–31)
CREAT SERPL-MCNC: 1 MG/DL (ref 0.7–1.3)
EGFRCR SERPLBLD CKD-EPI 2021: >60 ML/MIN/1.73M*2
GLUCOSE SERPL-MCNC: 87 MG/DL (ref 70–99)
POTASSIUM SERPL-SCNC: 4.3 MEQ/L (ref 3.5–5.1)
SARS-COV-2 RNA RESP QL NAA+PROBE: NEGATIVE
SODIUM SERPL-SCNC: 138 MEQ/L (ref 136–145)

## 2024-01-29 PROCEDURE — 85025 COMPLETE CBC W/AUTO DIFF WBC: CPT

## 2024-01-29 PROCEDURE — 36415 COLL VENOUS BLD VENIPUNCTURE: CPT

## 2024-01-29 PROCEDURE — 80048 BASIC METABOLIC PNL TOTAL CA: CPT

## 2024-01-29 PROCEDURE — 87635 SARS-COV-2 COVID-19 AMP PRB: CPT

## 2024-01-30 LAB
AGGLUTINATED RBCS, BLOOD SMEAR: ABNORMAL
BASOPHILS # BLD: 0 K/UL (ref 0.01–0.1)
BASOPHILS NFR BLD: 0 %
DIFFERENTIAL METHOD BLD: ABNORMAL
EOSINOPHIL # BLD: 0.34 K/UL (ref 0.04–0.54)
EOSINOPHIL NFR BLD: 4 %
ERYTHROCYTE [DISTWIDTH] IN BLOOD BY AUTOMATED COUNT: ABNORMAL %
HCT VFR BLD AUTO: ABNORMAL %
HGB BLD-MCNC: 12.2 G/DL (ref 13.7–17.5)
LYMPHOCYTES # BLD: 1.79 K/UL (ref 1.2–3.5)
LYMPHOCYTES NFR BLD: 21 %
MCH RBC QN AUTO: ABNORMAL PG
MCHC RBC AUTO-ENTMCNC: ABNORMAL G/DL
MCV RBC AUTO: ABNORMAL FL
MONOCYTES # BLD: 1.36 K/UL (ref 0.3–1)
MONOCYTES NFR BLD: 16 %
NEUTS BAND # BLD: 5.03 K/UL (ref 1.7–7)
NEUTS SEG NFR BLD: 59 %
PDW BLD AUTO: 11.6 FL (ref 9.4–12.4)
PLAT MORPH BLD: NORMAL
PLATELET # BLD AUTO: 194 K/UL (ref 150–350)
PLATELET # BLD EST: ABNORMAL 10*3/UL
POLYCHROMASIA BLD QL SMEAR: ABNORMAL
RBC # BLD AUTO: ABNORMAL 10*6/UL
WBC # BLD AUTO: 8.53 K/UL (ref 3.8–10.5)

## 2024-01-31 ENCOUNTER — HOSPITAL ENCOUNTER (OUTPATIENT)
Facility: HOSPITAL | Age: 79
Setting detail: HOSPITAL OUTPATIENT SURGERY
Discharge: HOME | End: 2024-01-31
Attending: INTERNAL MEDICINE | Admitting: INTERNAL MEDICINE
Payer: COMMERCIAL

## 2024-01-31 VITALS
TEMPERATURE: 98.3 F | BODY MASS INDEX: 30.8 KG/M2 | SYSTOLIC BLOOD PRESSURE: 159 MMHG | RESPIRATION RATE: 21 BRPM | DIASTOLIC BLOOD PRESSURE: 92 MMHG | HEIGHT: 71 IN | OXYGEN SATURATION: 99 % | WEIGHT: 220 LBS | HEART RATE: 82 BPM

## 2024-01-31 DIAGNOSIS — I25.10 CORONARY ARTERY DISEASE INVOLVING NATIVE CORONARY ARTERY OF NATIVE HEART WITHOUT ANGINA PECTORIS: ICD-10-CM

## 2024-01-31 PROCEDURE — 93458 L HRT ARTERY/VENTRICLE ANGIO: CPT | Mod: 26 | Performed by: INTERNAL MEDICINE

## 2024-01-31 PROCEDURE — 93458 L HRT ARTERY/VENTRICLE ANGIO: CPT | Performed by: INTERNAL MEDICINE

## 2024-01-31 PROCEDURE — 27200000 HC STERILE SUPPLY: Performed by: INTERNAL MEDICINE

## 2024-01-31 PROCEDURE — 93571 IV DOP VEL&/PRESS C FLO 1ST: CPT | Mod: 26,52,LD | Performed by: INTERNAL MEDICINE

## 2024-01-31 PROCEDURE — 99152 MOD SED SAME PHYS/QHP 5/>YRS: CPT | Performed by: INTERNAL MEDICINE

## 2024-01-31 PROCEDURE — 4A033BC MEASUREMENT OF ARTERIAL PRESSURE, CORONARY, PERCUTANEOUS APPROACH: ICD-10-PCS | Performed by: INTERNAL MEDICINE

## 2024-01-31 PROCEDURE — C1894 INTRO/SHEATH, NON-LASER: HCPCS | Performed by: INTERNAL MEDICINE

## 2024-01-31 PROCEDURE — 63700000 HC SELF-ADMINISTRABLE DRUG: Performed by: NURSE PRACTITIONER

## 2024-01-31 PROCEDURE — 71000011 HC PACU PHASE 1 EA ADDL MIN: Performed by: INTERNAL MEDICINE

## 2024-01-31 PROCEDURE — 93571 IV DOP VEL&/PRESS C FLO 1ST: CPT | Mod: 52,LD | Performed by: INTERNAL MEDICINE

## 2024-01-31 PROCEDURE — 4A023N7 MEASUREMENT OF CARDIAC SAMPLING AND PRESSURE, LEFT HEART, PERCUTANEOUS APPROACH: ICD-10-PCS | Performed by: INTERNAL MEDICINE

## 2024-01-31 PROCEDURE — B2111ZZ FLUOROSCOPY OF MULTIPLE CORONARY ARTERIES USING LOW OSMOLAR CONTRAST: ICD-10-PCS | Performed by: INTERNAL MEDICINE

## 2024-01-31 PROCEDURE — 25000000 HC PHARMACY GENERAL: Performed by: INTERNAL MEDICINE

## 2024-01-31 PROCEDURE — 71000001 HC PACU PHASE 1 INITIAL 30MIN: Performed by: INTERNAL MEDICINE

## 2024-01-31 PROCEDURE — 63600000 HC DRUGS/DETAIL CODE: Performed by: INTERNAL MEDICINE

## 2024-01-31 PROCEDURE — 63600105 HC IODINE BASED CONTRAST: Performed by: INTERNAL MEDICINE

## 2024-01-31 PROCEDURE — 99153 MOD SED SAME PHYS/QHP EA: CPT | Performed by: INTERNAL MEDICINE

## 2024-01-31 PROCEDURE — C1769 GUIDE WIRE: HCPCS | Performed by: INTERNAL MEDICINE

## 2024-01-31 RX ORDER — IOPAMIDOL 612 MG/ML
INJECTION, SOLUTION INTRAVASCULAR
Status: DISCONTINUED | OUTPATIENT
Start: 2024-01-31 | End: 2024-01-31 | Stop reason: HOSPADM

## 2024-01-31 RX ORDER — FENTANYL CITRATE 50 UG/ML
INJECTION, SOLUTION INTRAMUSCULAR; INTRAVENOUS
Status: DISCONTINUED | OUTPATIENT
Start: 2024-01-31 | End: 2024-01-31 | Stop reason: HOSPADM

## 2024-01-31 RX ORDER — LIDOCAINE HYDROCHLORIDE 10 MG/ML
INJECTION, SOLUTION INFILTRATION; PERINEURAL
Status: DISCONTINUED | OUTPATIENT
Start: 2024-01-31 | End: 2024-01-31 | Stop reason: HOSPADM

## 2024-01-31 RX ORDER — ASPIRIN 325 MG
325 TABLET ORAL ONCE
Status: COMPLETED | OUTPATIENT
Start: 2024-01-31 | End: 2024-01-31

## 2024-01-31 RX ORDER — SODIUM CHLORIDE 9 MG/ML
40 INJECTION, SOLUTION INTRAVENOUS CONTINUOUS
Status: DISCONTINUED | OUTPATIENT
Start: 2024-01-31 | End: 2024-01-31 | Stop reason: HOSPADM

## 2024-01-31 RX ORDER — HEPARIN SODIUM 1000 [USP'U]/ML
INJECTION, SOLUTION INTRAVENOUS; SUBCUTANEOUS
Status: DISCONTINUED | OUTPATIENT
Start: 2024-01-31 | End: 2024-01-31 | Stop reason: HOSPADM

## 2024-01-31 RX ORDER — MIDAZOLAM HYDROCHLORIDE 2 MG/2ML
INJECTION, SOLUTION INTRAMUSCULAR; INTRAVENOUS
Status: DISCONTINUED | OUTPATIENT
Start: 2024-01-31 | End: 2024-01-31 | Stop reason: HOSPADM

## 2024-01-31 RX ADMIN — ASPIRIN 325 MG: 325 TABLET ORAL at 08:51

## 2024-01-31 NOTE — Clinical Note
Catheter advanced into left ventricle over the wire, left ventriculer pressures recorded and LV/AO pullback pressures were obtained

## 2024-01-31 NOTE — PRE-PROCEDURE NOTE
Cardiac Cath Lab Pre-procedure Note     - Patient was seen and examined at bedside.  - The patient's chart was reviewed.  - The procedure, treatment alternatives, risks and benefits were explained with specific risks discussed.  - Patient was consented for cardiac cath procedure and possible PCI.  - Patient's case was found appropriate for antiplatelet therapy plus AC (for aflutter hx).     Patient's clinical presentation to the cardiac cath lab: anginal equivalent     Patient is at risk for stroke, acute myocardial infarction; vascular complications including risk of hematoma/retroperitoneal bleeding/pseudo-aneurysms/arteriovenous fistula formation/bleeding, renal failure potentially requiring dialysis; allergic reaction including anaphylaxis, and emergency cardiac surgery. Patient appears to be managing well.         Total anti-anginal medications: 1.         Pre-sedation assessment  ASA 3  Mallampati class: III - soft palate, base of uvula visible.

## 2024-01-31 NOTE — DISCHARGE INSTRUCTIONS
Post cardiac catheterization instructions:  No driving, operating heavy machinery, making critical decisions or activities that require balance for 24 hours.  This is because of sedation you received for your procedure.  On day of discharge, limit activities.  No heavy lifting greater than 10 lbs for the next 2 days after procedure.    Remove dressing next day. Keep site clean and dry.  May shower next day of procedure. Do not submerge catherization site in pool or tub baths for 5 days  Call if  swelling, bleeding, fever or drainage from catheterization site        Medications: Take medications as directed.  Call your physician in any questions or concerns      Follow up with Dr Nichole   [phone]  Follow up in 2 weeks

## 2024-02-07 NOTE — PROGRESS NOTES
2024  Dakotah Villalba MD  [address]  [place] [address]    Troy Benoit MD    [address] [place] [address]      Re:  TOBIAS VALVERDE  :  1945      Dear Dakotah:    It was my pleasure to see Tobias Valverde in the cardiovascular office today.  We will follow Tobias for  subclinical coronary artery disease with an Agatston score of 648 with two vessel involvement, hypertension and polygenic hypercholesterolemia.    He  underwent right total knee replacement  at the [facility].  This was complicated by postoperative paroxysmal SVT.  He was discharged on flecainide 100 mg twice daily and Toprol- mg daily.  Ultimately he underwent a Medtronic loop recorder and a subsequent ablation for atrial tachyarrhythmias.  He had complications with infection of the Medtronic loop recorder and his recently implanted right total knee.  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  He is now in rate controlled atrial flutter and on a combination of Toprol-XL and Eliquis.     Tobias reported to this office after visiting his ophthalmologist.  Apparently he has had some bleeding involving his right and left eyes.  There is some concern about him remaining on Eliquis.  We will contact his ophthalmologist to see if we need to stop the Eliquis.    This led to a rediscussion of his atrial flutter and anticoagulation.  He has not had any procedures on his atrial flutter.  There has been no cardioversion and there has been no ablation.      During sexual relations apparently his heart races.  We had him  wear a Zio patch for 1 week.  Certainly he may need to entertain ablation of his atrial flutter.  The Zio patch showed that his rhythm was 100% atrial flutter.  His heart rate ranged from .  There was no other ectopy.       His secondary issue is anticoagulation is now becoming an issue related to his  retinopathy and retinal bleeding.  Will contact his ophthalmologist to see what procedures are being planned.  Obviously he would be a good watchman candidate so we will refer him to electrophysiology.  He was seen by Dr. Su who believes that no further procedures are necessary at this time unless he has significant bleeding.      He was followed by Dr. Juan C Lynn at [place].  Recent discussions indicate that he is satisfied with rate control of his atrial flutter and anticoagulation with the use of Eliquis.  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      At this time we discussed removal of his Medtronic loop recorder as it is not serving any real purpose.  At this time he will remain on Eliquis and not pursue a watchman device. He does have a remote history of subdural hematoma in 2008.     His blood pressure is stable today at 138/88   we reviewed his blood pressure recordings over the past few months.  He will continue with the use of lisinopril 40 mg at bedtime and amlodipine at a relatively low dose of 2.5 mg in the morning.   He is no longer on hydrochlorothiazide as he developed gout with the thiazide diuretic.  His last echocardiogram in 2021 demonstrated normal left ventricular size and preserved systolic function.    His recent blood work 9/18/2023 revealed a creatinine of 1.1 and a GFR greater than 60.  Tobias underwent some recent blood work from his hematologist and was noted to have a creatinine of 1.4 and a GFR 52.  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  His repeat study showed a creatinine of 1.05.    We reviewed his prevention program in detail.  His last lipid panel is noted below and is excellent.  Presently, he is on Crestor 10 mg daily.     There is evidence of insulin resistance.  We recommended a weight reduction diet.  His goal weight is 220 pounds.  His present  weight is 232 pounds.  We discussed a low-carbohydrate, low-saturated fat Mediterranean Diet.      He is also been noticing some dyspnea on exertion.  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  He is in chronic atrial flutter with a baseline heart rate of 90.  We  scheduled him for stress echo to assess his overall endurance, heart rate and blood pressure response to exercise.  His study was negative for ischemia.      He also carries a history of coronary artery disease and we will rule out any significant coronary ischemia.  Unfortunately has developed significant left knee arthritis and will not be able to undergo a stress echo.     We had him undergo a PET scan to rule out ischemia.  His scan showed a moderate size reversible defect in the mid to apical anteroseptal walls consistent with ischemia.  Due to this result patient was scheduled for cardiac catheterization which she had performed on 1/31/2024 cardiac catheterization showed patent coronary arteries except for the distal LAD had a 40% lesion.  Medical management is indicated.    He reports no chest pain, mild shortness of breath with stairs.  Otherwise is feeling well.  We made no changes to his program today.    PAST MEDICAL HISTORY:   1.  Subclinical coronary artery disease, coronary calcium score 648 with two vessel involvement including the LAD and RCA, March 2016.  Stress echo was performed in April 2016 which was negative for myocardial ischemia or scar, normal left ventricular size, preserved systolic function.   2.  dyslipidemia consistent with mild polygenic hypercholesterolemia, total cholesterol 198, , HDL 65, triglycerides 52, apoB 92, LDL-P 1496, normal LP(a)  3.  MTHFR polymorphism with hyperhomocystinemia  4.  endothelial dysfunction  5.  insulin resistance  6.  Hypertension  7.  abnormal EKG with incomplete right bundle branch block, frequent PVC's  8.  subdural hematoma in 2008 secondary to traumatic injury head injury  secondary to a steel pipe.  Complicated by paroxysmal atrial fibrillation.  Now on Eliquis for persistent atrial flutter.  9.  Dilated aortic root at 4.4 cm.  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  Follow at the [facility].  11.  Postoperative PSVT.  12.  Status post ablation 2019 for atrial fibrillation and PSVT.  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  Now in persistent atrial flutter with brief periods of more rapid atrial fibrillation.  13.  Status post ablation of ventricular arrhythmia LVOT.  2019     Past Medical History:   Diagnosis Date   • Arthritis    • Atrial fibrillation (CMS/HCC)    • Coronary artery disease    • Homozygous for C677T polymorphism of MTHFR (CMS/HCC)    • Hyperlipidemia    • Hypertension    • Infection associated with internal knee prosthesis     • Obesity    • Partial small bowel obstruction (CMS/HCC)    • Phrenic nerve paralysis     resolved   • Renal cyst    • Retinal hemorrhage    • SVT (supraventricular tachycardia)    • Traumatic subdural hematoma (CMS/HCC) 2008     Past Surgical History:   Procedure Laterality Date   • APPENDECTOMY     • CARDIAC ELECTROPHYSIOLOGY STUDY AND ABLATION  05/2019    Atrial fibrillation: PVI, roof, lateral mitral isthmus, CFAE and CTI.  Subsequent phrenic nerve injury   • CARDIAC ELECTROPHYSIOLOGY STUDY AND ABLATION  05/2019    Frequent PVCs from [facility]   • HERNIA REPAIR  07/2021    inguinal hernia's   (X2)   • KNEE SURGERY Right 07/2019    infection   • REPLACEMENT TOTAL KNEE Right 02/2019   • SUBDURAL HEMATOMA EVACUATION VIA CRANIOTOMY      Neurosurgery for subdural hematoma      CURRENT MEDICATIONS:     Current Outpatient Medications:   •  amLODIPine (NORVASC) 5 mg tablet, Take 5 mg by mouth daily., Disp: , Rfl:   •  amoxicillin (AMOXIL) 500 mg capsule, TAKE 4 CAPSULES BY MOUTH 1 HOUR PRIOR TO APPOINTMENT, Disp: , Rfl:   •  apixaban (ELIQUIS) 5 mg tablet, Take 5 mg by mouth 2 (two) times a day., Disp: , Rfl:   •   cholecalciferol, vitD3,/vit K2 (VITAMIN D3-VITAMIN K2 ORAL), Take by mouth daily., Disp: , Rfl:   •  coenzyme Q10 (COQ10) 200 mg capsule, Take 200 mg by mouth daily.  , Disp: , Rfl:   •  latanoprost (XALATAN) 0.005 % ophthalmic solution, Administer 1 drop into both eyes nightly., Disp: , Rfl: 6  •  lisinopriL 40 mg tablet, Take 40 mg by mouth daily., Disp: , Rfl:   •  metoprolol succinate XL (TOPROL-XL) 100 mg 24 hr tablet, Take 100 mg by mouth daily. , Disp: , Rfl: 3  •  rosuvastatin (CRESTOR) 10 mg tablet, Take 10 mg by mouth daily., Disp: , Rfl:   •  vitamin B complex (B COMPLEX ORAL), Take by mouth daily., Disp: , Rfl:   •  ZINC ORAL, Take by mouth daily., Disp: , Rfl:     SUPPLEMENTS:  Coenzyme-Q10 100 mg daily, vitamin-D 1104-9070 international units daily, omega-3 fish oil 1000 mg daily, multiple vitamin.    ALLERGIES:  No known drug allergies.    FAMILY HISTORY:  Father  young at age 55 of lung cancer.  Mother has a pacemaker.    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  His wife's name is Jaye.  They have three children.  He has seven grandchildren.  He smoked 38 years ago for five years.  He rarely drinks alcohol.  He has an active lifestyle.  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    REVIEW OF SYSTEMS: Tobias is now in persistent atrial flutter with rate control.    PHYSICAL EXAMINATION:  Vitals:    24 1137   BP: 138/88   Pulse: 89   Resp: 16   SpO2: 99%     Wt Readings from Last 3 Encounters:   24 104 kg (229 lb 9.6 oz)   24 99.8 kg (220 lb)   24 105 kg (232 lb)     BP Readings from Last 3 Encounters:   24 138/88   24 (!) 159/92   24 116/84     HEENT:  Unremarkable.  Neck:  Supple, no JVD.  Lungs:  Clear to auscultation and percussion.  Cardiac:  Regular rate and rhythm.  Abdomen:  Soft, bowel sounds present, no organomegaly.  Extremities:  No edema, pulses  intact.  Skin:  Warm and dry.  Neuro:  Alert and oriented X 3.    LABORATORY DATA:      EKG:  Atrial Flutter rate controlled.    Coronary calcium score 2016:  648 with two vessel involvement.      Stress echo was negative for myocardial ischemia or scar, normal left ventricular size, preserved systolic function.    Myocardial perfusion scan February 2019: Normal    Echocardiogram February 2019: Normal left ventricular size preserved function.  Mild mitral and tricuspid regurgitation.  Ascending aorta measured 4.2 cm.    Echocardiogram September 2021:  • The left ventricle is normal in size. Normal left ventricular wall thickness. There are no segmental wall motion abnormalities. Normal left ventricular ejection fraction. The left ventricular ejection fraction by visual estimate is 65% to 70%.  • Unable to assess LV diastolic function due to arrhythmia.  • The right ventricle is normal in size. There is normal function of the right ventricle.  • There is no mitral regurgitation. There is no mitral stenosis.  • There is no aortic stenosis. There is mild aortic regurgitation.  • There is trace tricuspid regurgitation.  • The aortic root is mildly enlarged. The aorta measures 4.2 cm at the sinus of Valsalva. The proximal segment of the ascending aorta is mildly enlarged. The proximal segment of the ascending aorta measures 4.0 cm.    Lipid panel:   Component      Latest Ref Rng 12/29/2021 7/21/2022 9/18/2023   Triglycerides      <150 mg/dL 80  45  78    Cholesterol      <=200 mg/dL 149  152  137    HDL      >=40 mg/dL 62  62  60    LDL Calculated      <=100 mg/dL 71  81  61    Non-HDL, Calculated      mg/dL 87  90  77    RISK      <=5.0  2.4  2.5  2.3        IMPRESSIONS/RECOMMENDATIONS:  1. Coronary artery disease.  The patient's stress test was negative for ischemia.  He is not on aspirin as he is on Eliquis.  PET scan showed a reversible defect.  Cardiac catheterization showed 40% distal LAD lesion otherwise patent  coronary arteries.  We will continue optimal medical management.  2. Hypertension.  Continue Toprol-XL and lisinopril and amlodipine.  3. Polygenic hypercholesterolemia.  The patient will continue his Crestor  10 mg daily.  Our goal is an LDL below 70.  Needs repeat lipid panel.  4. MTHFR polymorphism.  Continue B-complex with L5 methyl folate.  5. Insulin resistance.  Continue low carbohydrate low saturated fat Mediterranean diet and exercise program.  6. Obesity.   We discussed a low-carbohydrate, low-saturated fat Mediterranean Diet and an exercise walking program.  His goal weight is under 220 pounds.  7.         Persistent atrial flutter.  Status post A. fib ablation complicated by phrenic nerve paralysis.  Patient is now resigned to rate control and anticoagulation for his atrial flutter.  He was followed by Dr. Juan C Lynn at [facility].  Rate is seen by Dr. Su who does not feel he will benefit from ablation of his atrial flutter.  He should remain on Eliquis.  8.         History of subdural hematoma.  This was secondary to head trauma and was not spontaneous.  He is on Eliquis now for his atrial flutter.  9.         History of phrenic nerve injury with diaphragmatic paralysis.  Resolved.  10.       History of total knee replacement.  Followed [facility].  He may need a left total knee replacement.  11.       History of cold agglutinins.  Followed [facility].  12.       Chronic kidney disease stage IIIa.  Patient's recent creatinine is 1.1 with a GFR of 60    13.       Dilated aortic root.  The patient's recent echocardiogram demonstrated mild dilatation of the sinus of Valsalva at 4.2 cm and ascending thoracic aorta at 4.0 cm.  A CT angiogram of the chest 2019 demonstrated normal aortic root at 3.9 cm.  14.       Dyspnea on exertion.  The patient does have a history of phrenic nerve paralysis as well as chronic atrial flutter with controlled ventricular  response.  He underwent Zio patch monitoring for 8 to 14 days.  Report as above.    Summary:.      Tobias is demonstrating cardiovascular stability.      We reviewed his cardiac history.  He now has chronic atrial flutter rate controlled with Toprol-XL and anticoagulated with Eliquis.    His blood pressures been reasonably well controlled with the use of amlodipine and lisinopril.    His weight is 229 pounds with BMI 32.  He was counseled on low carbohydrate low saturated fat Mediterranean diet.    He has noticed some dyspnea on exertion.  He has a history phrenic nerve paralysis.  Studies as above    He is now suffering from left knee arthritis.  He is contemplating left total knee replacement.  This is being placed on hold.    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  Will discuss discontinuing his Eliquis.  With his ophthalmologist Dr Harley Carranza.  Apparently she requested bilateral carotid ultrasounds to rule out significant carotid arterial sclerosis.  She feels that the Eliquis may be aggravating Valsalva induced dot hemorrhages.  Ideally we would like to move him off of Eliquis.  EP does not feel he would benefit from the Watchman at this time.  He will continue on present therapy.    This was a 30 minute patient encounter with greater than 50% time spent in care coordination and counseling.       Sincerely,    BASSEM Sales    02/13/24

## 2024-02-13 ENCOUNTER — OFFICE VISIT (OUTPATIENT)
Dept: CARDIOLOGY | Facility: CLINIC | Age: 79
End: 2024-02-13
Payer: COMMERCIAL

## 2024-02-13 VITALS
WEIGHT: 229.6 LBS | DIASTOLIC BLOOD PRESSURE: 88 MMHG | SYSTOLIC BLOOD PRESSURE: 138 MMHG | HEIGHT: 71 IN | HEART RATE: 89 BPM | BODY MASS INDEX: 32.14 KG/M2 | OXYGEN SATURATION: 99 % | RESPIRATION RATE: 16 BRPM

## 2024-02-13 DIAGNOSIS — E78.00 HYPERCHOLESTEROLEMIA: ICD-10-CM

## 2024-02-13 DIAGNOSIS — I25.10 CORONARY ARTERY DISEASE INVOLVING NATIVE CORONARY ARTERY OF NATIVE HEART WITHOUT ANGINA PECTORIS: Primary | ICD-10-CM

## 2024-02-13 LAB
QRS DURATION: 92
QT INTERVAL: 398
QTC CALCULATION(BAZETT): 484
R AXIS: 64
T WAVE AXIS: 45
VENTRICULAR RATE: 89

## 2024-02-13 PROCEDURE — 3075F SYST BP GE 130 - 139MM HG: CPT | Performed by: NURSE PRACTITIONER

## 2024-02-13 PROCEDURE — 93000 ELECTROCARDIOGRAM COMPLETE: CPT | Performed by: NURSE PRACTITIONER

## 2024-02-13 PROCEDURE — 3008F BODY MASS INDEX DOCD: CPT | Performed by: NURSE PRACTITIONER

## 2024-02-13 PROCEDURE — 99214 OFFICE O/P EST MOD 30 MIN: CPT | Performed by: NURSE PRACTITIONER

## 2024-02-13 PROCEDURE — 3079F DIAST BP 80-89 MM HG: CPT | Performed by: NURSE PRACTITIONER

## 2024-02-20 ENCOUNTER — TELEPHONE (OUTPATIENT)
Dept: CARDIOLOGY | Facility: CLINIC | Age: 79
End: 2024-02-20
Payer: COMMERCIAL

## 2024-03-21 VITALS — HEIGHT: 71 IN | WEIGHT: 229 LBS | BODY MASS INDEX: 32.06 KG/M2

## 2024-03-21 LAB — STRESS TARGET HR: 122 BPM

## 2024-06-03 ENCOUNTER — OFFICE VISIT (OUTPATIENT)
Dept: THORACIC SURGERY | Facility: CLINIC | Age: 79
End: 2024-06-03
Payer: COMMERCIAL

## 2024-06-03 VITALS
DIASTOLIC BLOOD PRESSURE: 92 MMHG | BODY MASS INDEX: 32.21 KG/M2 | HEIGHT: 70 IN | HEART RATE: 79 BPM | SYSTOLIC BLOOD PRESSURE: 147 MMHG | OXYGEN SATURATION: 99 % | TEMPERATURE: 97.5 F | WEIGHT: 225 LBS

## 2024-06-03 DIAGNOSIS — R91.1 SOLITARY PULMONARY NODULE: Primary | ICD-10-CM

## 2024-06-03 PROCEDURE — 3008F BODY MASS INDEX DOCD: CPT | Performed by: THORACIC SURGERY (CARDIOTHORACIC VASCULAR SURGERY)

## 2024-06-03 PROCEDURE — 3080F DIAST BP >= 90 MM HG: CPT | Performed by: THORACIC SURGERY (CARDIOTHORACIC VASCULAR SURGERY)

## 2024-06-03 PROCEDURE — 99214 OFFICE O/P EST MOD 30 MIN: CPT | Performed by: THORACIC SURGERY (CARDIOTHORACIC VASCULAR SURGERY)

## 2024-06-03 PROCEDURE — 3077F SYST BP >= 140 MM HG: CPT | Performed by: THORACIC SURGERY (CARDIOTHORACIC VASCULAR SURGERY)

## 2024-06-03 NOTE — PROGRESS NOTES
Thoracic Surgery    Patient ID: Tobias Little                              : 1945  MRN: [id number]  Clinic: [facility]                                            Visit Date: 6/3/2024  Encounter Provider: Abner Menchaca  Referring Provider: Dakotah Villalba MD       Patient: Tobias Little  Chief Complaint: Follow-up for lung nodules      Subjective     Tobias Little is a very pleasant 78 y.o. old male presenting today for follow-up regarding pulmonary nodules.  His last CT scan was in February at the [facility] and the previously noted 8 mm right lower lobe nodule is stable on that scan.    ROS: His energy level is good.  Appetite is good and weight is stable.  He is planning for left knee replacement after his return from [place] in September.  He recently had right eye conjunctivitis with an emergency room visit.  That is recovered fully.  He does not have cough, dyspnea, fever, chills, or hemoptysis.  The remainder of his review of systems is negative.    History:  has a past medical history of Arthritis, Atrial fibrillation (CMS/HCC), Coronary artery disease, Homozygous for C677T polymorphism of MTHFR (CMS/HCC), Hyperlipidemia, Hypertension, Infection associated with internal knee prosthesis  (CMS/HCC), Obesity, Partial small bowel obstruction (CMS/HCC), Phrenic nerve paralysis, Renal cyst, Retinal hemorrhage, SVT (supraventricular tachycardia) (CMS/HCC), and Traumatic subdural hematoma (CMS/HCC) ().  Past Surgical History:  has a past surgical history that includes Appendectomy; Subdural hematoma evacuation via craniotomy; Replacement total knee (Right, 2019); Cardiac electrophysiology study and ablation (2019); Knee surgery (Right, 2019); Hernia repair (2021); and Cardiac electrophysiology study and ablation (2019).  Social History:   Social History     Tobacco Use    Smoking status: Former    Smokeless tobacco: Former    Tobacco comments:     Pt Quit Smoking  "over 40 years ago    Vaping Use    Vaping Use: Never used   Substance Use Topics    Alcohol use: Yes     Comment: Occassionally     Drug use: No     Medications:   Current Outpatient Medications   Medication Sig Dispense Refill    amLODIPine (NORVASC) 5 mg tablet Take 5 mg by mouth daily.      amoxicillin (AMOXIL) 500 mg capsule TAKE 4 CAPSULES BY MOUTH 1 HOUR PRIOR TO APPOINTMENT      apixaban (ELIQUIS) 5 mg tablet Take 5 mg by mouth 2 (two) times a day.      cholecalciferol, vitD3,/vit K2 (VITAMIN D3-VITAMIN K2 ORAL) Take by mouth daily.      coenzyme Q10 (COQ10) 200 mg capsule Take 200 mg by mouth daily.        latanoprost (XALATAN) 0.005 % ophthalmic solution Administer 1 drop into both eyes nightly.  6    lisinopriL 40 mg tablet Take 40 mg by mouth daily.      metoprolol succinate XL (TOPROL-XL) 100 mg 24 hr tablet Take 100 mg by mouth daily.   3    rosuvastatin (CRESTOR) 10 mg tablet Take 10 mg by mouth daily.      vitamin B complex (B COMPLEX ORAL) Take by mouth daily.      ZINC ORAL Take by mouth daily.       No current facility-administered medications for this visit.       Allergies: No Known Allergies       Objective   Vitals: Visit Vitals  BP (!) 147/92 (BP Location: Left upper arm, Patient Position: Sitting)   Pulse 79   Temp 36.4 °C (97.5 °F)   Ht 1.778 m (5' 10\")   Wt 102 kg (225 lb)   SpO2 99%   BMI 32.28 kg/m²      General: He is in no distress and he appears younger than his stated age  Nodes: No cervical or supraclavicular adenopathy   Pulmonary: Clear bilaterally   CV: Regular   Extremities: No clubbing cyanosis or edema      Imaging Review: We have reviewed the reports from this previous CT scans.      Assessment   This pleasant 78-year-old male with an indeterminate 8 mm nodule in the right lower lobe has most recent imaging in February.  We reviewed the results with him.  We will plan to see him in August with repeat CT scan of the chest.         "

## 2024-06-03 NOTE — LETTER
6/3/24    Re: Tobias Little  1945        Dear Dakotah Villalba MD    I appreciate the opportunity to evaluate  your patient Tobias Littel in the office today.    Tobias Little is a very pleasant 78 y.o. old male presenting today for follow-up regarding pulmonary nodules.  His last CT scan was in February at the [facility] and the previously noted 8 mm right lower lobe nodule is stable on that scan.    ROS: His energy level is good.  Appetite is good and weight is stable.  He is planning for left knee replacement after his return from [place] in September.  He recently had right eye conjunctivitis with an emergency room visit.  That is recovered fully.  He does not have cough, dyspnea, fever, chills, or hemoptysis.  The remainder of his review of systems is negative.    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  Past Surgical History:  has a past surgical history that includes Appendectomy; Subdural hematoma evacuation via craniotomy; Replacement total knee (Right, 02/2019); Cardiac electrophysiology study and ablation (05/2019); Knee surgery (Right, 07/2019); Hernia repair (07/2021); and Cardiac electrophysiology study and ablation (05/2019).  Social History:   Social History     Tobacco Use    Smoking status: Former    Smokeless tobacco: Former    Tobacco comments:     Pt Quit Smoking over 40 years ago    Vaping Use    Vaping Use: Never used   Substance Use Topics    Alcohol use: Yes     Comment: Occassionally     Drug use: No     Medications:   Current Outpatient Medications    Medication Sig Dispense Refill    amLODIPine (NORVASC) 5 mg tablet Take 5 mg by mouth daily.      amoxicillin (AMOXIL) 500 mg capsule TAKE 4 CAPSULES BY MOUTH 1 HOUR PRIOR TO APPOINTMENT      apixaban (ELIQUIS) 5 mg tablet Take 5 mg by mouth 2 (two) times a day.      cholecalciferol, vitD3,/vit K2 (VITAMIN D3-VITAMIN K2 ORAL) Take by mouth daily.      coenzyme Q10 (COQ10) 200 mg capsule Take 200 mg by mouth daily.        latanoprost (XALATAN) 0.005 % ophthalmic solution Administer 1 drop into both eyes nightly.  6    lisinopriL 40 mg tablet Take 40 mg by mouth daily.      metoprolol succinate XL (TOPROL-XL) 100 mg 24 hr tablet Take 100 mg by mouth daily.   3    rosuvastatin (CRESTOR) 10 mg tablet Take 10 mg by mouth daily.      vitamin B complex (B COMPLEX ORAL) Take by mouth daily.      ZINC ORAL Take by mouth daily.       No current facility-administered medications for this visit.       Allergies: No Known Allergies           This pleasant 78-year-old male with an indeterminate 8 mm nodule in the right lower lobe has most recent imaging in February.  We reviewed the results with him.  We will plan to see him in August with repeat CT scan of the chest.      Thank you for the opportunity to participate in his care.    Abner Menchaca MD PhD

## 2024-06-03 NOTE — LETTER
6/3/24  Re: Tobias Little  1945        Dear Dakotah Villalba MD    I appreciate the opportunity to evaluate  your patient Tobias Little in the office today.    Tobias Little is a very pleasant 78 y.o. old male presenting today for follow-up regarding pulmonary nodules.  His last CT scan was in February at the [facility] and the previously noted 8 mm right lower lobe nodule is stable on that scan.    ROS: His energy level is good.  Appetite is good and weight is stable.  He is planning for left knee replacement after his return from [place] in September.  He recently had right eye conjunctivitis with an emergency room visit.  That is recovered fully.  He does not have cough, dyspnea, fever, chills, or hemoptysis.  The remainder of his review of systems is negative.    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  Past Surgical History:  has a past surgical history that includes Appendectomy; Subdural hematoma evacuation via craniotomy; Replacement total knee (Right, 02/2019); Cardiac electrophysiology study and ablation (05/2019); Knee surgery (Right, 07/2019); Hernia repair (07/2021); and Cardiac electrophysiology study and ablation (05/2019).  Social History:   Social History     Tobacco Use    Smoking status: Former    Smokeless tobacco: Former    Tobacco comments:     Pt Quit Smoking over 40 years ago    Vaping Use    Vaping Use: Never used   Substance Use Topics    Alcohol use: Yes     Comment: Occassionally     Drug use: No     Medications:   Current Outpatient Medications    Medication Sig Dispense Refill    amLODIPine (NORVASC) 5 mg tablet Take 5 mg by mouth daily.      amoxicillin (AMOXIL) 500 mg capsule TAKE 4 CAPSULES BY MOUTH 1 HOUR PRIOR TO APPOINTMENT      apixaban (ELIQUIS) 5 mg tablet Take 5 mg by mouth 2 (two) times a day.      cholecalciferol, vitD3,/vit K2 (VITAMIN D3-VITAMIN K2 ORAL) Take by mouth daily.      coenzyme Q10 (COQ10) 200 mg capsule Take 200 mg by mouth daily.        latanoprost (XALATAN) 0.005 % ophthalmic solution Administer 1 drop into both eyes nightly.  6    lisinopriL 40 mg tablet Take 40 mg by mouth daily.      metoprolol succinate XL (TOPROL-XL) 100 mg 24 hr tablet Take 100 mg by mouth daily.   3    rosuvastatin (CRESTOR) 10 mg tablet Take 10 mg by mouth daily.      vitamin B complex (B COMPLEX ORAL) Take by mouth daily.      ZINC ORAL Take by mouth daily.       No current facility-administered medications for this visit.       Allergies: No Known Allergies           This pleasant 78-year-old male with an indeterminate 8 mm nodule in the right lower lobe has most recent imaging in February.  We reviewed the results with him.  We will plan to see him in August with repeat CT scan of the chest.      Thank you for the opportunity to participate in his care.    Abner Menchaca MD PhD

## 2024-06-12 ENCOUNTER — TELEPHONE (OUTPATIENT)
Dept: SCHEDULING | Facility: CLINIC | Age: 79
End: 2024-06-12
Payer: COMMERCIAL

## 2024-06-12 NOTE — TELEPHONE ENCOUNTER
Alyssa from [employer] [facility] calling to Request ICD code. Alyssa Fax request over on 06.05.24.    Please Advise   Fax or phone Call.  Fax: [phone]  Phone:[phone]

## 2024-06-13 NOTE — TELEPHONE ENCOUNTER
I called [facility] for the Zio Patch Please call looking for a new diagnosis code . 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  They need a new diagnosis code for the Zio

## 2024-07-24 ENCOUNTER — TELEPHONE (OUTPATIENT)
Dept: SCHEDULING | Facility: CLINIC | Age: 79
End: 2024-07-24
Payer: COMMERCIAL

## 2024-07-24 NOTE — TELEPHONE ENCOUNTER
Patient is asking if he still needs to see Dr Su in December?    He is seeing Dr Ziegler in September.    Please call him at [phone]  to advise. ty

## 2024-07-25 NOTE — TELEPHONE ENCOUNTER
I spoke with patient, per last office visit with Dr. Su, he wanted patient to follow-up annually for his arrhythmias.  Patient verbalized understanding and will keep follow-up visit with Dr. Su in December.

## 2024-07-31 ENCOUNTER — TELEPHONE (OUTPATIENT)
Dept: CARDIOLOGY | Facility: CLINIC | Age: 79
End: 2024-07-31
Payer: COMMERCIAL

## 2024-07-31 NOTE — TELEPHONE ENCOUNTER
Patient requesting to keep his Apt on 09.03.24. Patient Understands  will not be in but is requesting follow up to be Nurse If Possible.     Patient Also requesting BW script to be put in his chart to go at the end of August. Please Advise P:[phone]

## 2024-08-01 NOTE — TELEPHONE ENCOUNTER
Spoke with pt stating he can keep his 09/03 appt. With Dr. DWYER  He said he did not need labs mailed to his home.

## 2024-08-12 ENCOUNTER — HOSPITAL ENCOUNTER (OUTPATIENT)
Dept: RADIOLOGY | Facility: HOSPITAL | Age: 79
Discharge: HOME | End: 2024-08-12
Attending: PHYSICIAN ASSISTANT
Payer: COMMERCIAL

## 2024-08-12 DIAGNOSIS — R91.8 MULTIPLE LUNG NODULES ON CT: ICD-10-CM

## 2024-08-12 DIAGNOSIS — R91.1 LUNG NODULE SEEN ON IMAGING STUDY: ICD-10-CM

## 2024-08-12 PROCEDURE — 71250 CT THORAX DX C-: CPT

## 2024-08-19 ENCOUNTER — OFFICE VISIT (OUTPATIENT)
Dept: THORACIC SURGERY | Facility: CLINIC | Age: 79
End: 2024-08-19
Payer: COMMERCIAL

## 2024-08-19 VITALS
TEMPERATURE: 97.3 F | DIASTOLIC BLOOD PRESSURE: 99 MMHG | HEART RATE: 85 BPM | SYSTOLIC BLOOD PRESSURE: 164 MMHG | WEIGHT: 225 LBS | HEIGHT: 70 IN | OXYGEN SATURATION: 99 % | BODY MASS INDEX: 32.21 KG/M2

## 2024-08-19 DIAGNOSIS — R91.1 SOLITARY PULMONARY NODULE: Primary | ICD-10-CM

## 2024-08-19 PROCEDURE — 3080F DIAST BP >= 90 MM HG: CPT | Performed by: THORACIC SURGERY (CARDIOTHORACIC VASCULAR SURGERY)

## 2024-08-19 PROCEDURE — 3008F BODY MASS INDEX DOCD: CPT | Performed by: THORACIC SURGERY (CARDIOTHORACIC VASCULAR SURGERY)

## 2024-08-19 PROCEDURE — 3077F SYST BP >= 140 MM HG: CPT | Performed by: THORACIC SURGERY (CARDIOTHORACIC VASCULAR SURGERY)

## 2024-08-19 PROCEDURE — 99214 OFFICE O/P EST MOD 30 MIN: CPT | Performed by: THORACIC SURGERY (CARDIOTHORACIC VASCULAR SURGERY)

## 2024-08-19 NOTE — PROGRESS NOTES
Thoracic Surgery    Patient ID: Tobias Little                              : 1945  MRN: [id number]  Clinic: [facility]                                            Visit Date: 2024  Encounter Provider: Abner Menchaca  Referring Provider: Dakotah Villalba MD         Patient: Tobias Little  Chief Complaint: Follow-up for indeterminate 9 mm right lower lobe nodule      Subjective     Tobias Little is a delightful 78 y.o. old male former smoker (10-year history; quit ) who is presenting today for ongoing evaluation of a 9 mm nodule which was initially identified in 2019..     His ROS is notable for absence of cough, hemoptysis, dyspnea, exercise limitation, fever, and chills.  Appetite is good.  Weight is stable.  He has persistent left knee pain and is scheduled to have left knee arthroplasty 2024.  He has an upcoming 3-week trip to [place].       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  Past Surgical History:  has a past surgical history that includes Appendectomy; Subdural hematoma evacuation via craniotomy; Replacement total knee (Right, 2019); Cardiac electrophysiology study and ablation (2019); Knee surgery (Right, 2019); Hernia repair (2021); and Cardiac electrophysiology study and ablation (2019).  Social History:   Social History     Tobacco Use    Smoking status: Former    Smokeless tobacco: Former    Tobacco comments:     Pt Quit Smoking over 40 years ago    Vaping Use    Vaping Use: Never used   Substance Use Topics    Alcohol use:  "Yes     Comment: Occassionally     Drug use: No     Medications:   Current Outpatient Medications   Medication Sig Dispense Refill    amLODIPine (NORVASC) 5 mg tablet Take 5 mg by mouth daily.      amoxicillin (AMOXIL) 500 mg capsule TAKE 4 CAPSULES BY MOUTH 1 HOUR PRIOR TO APPOINTMENT      apixaban (ELIQUIS) 5 mg tablet Take 5 mg by mouth 2 (two) times a day.      cholecalciferol, vitD3,/vit K2 (VITAMIN D3-VITAMIN K2 ORAL) Take by mouth daily.      coenzyme Q10 (COQ10) 200 mg capsule Take 200 mg by mouth daily.        latanoprost (XALATAN) 0.005 % ophthalmic solution Administer 1 drop into both eyes nightly.  6    lisinopriL 40 mg tablet Take 40 mg by mouth daily.      metoprolol succinate XL (TOPROL-XL) 100 mg 24 hr tablet Take 100 mg by mouth daily.   3    rosuvastatin (CRESTOR) 10 mg tablet Take 10 mg by mouth daily.      vitamin B complex (B COMPLEX ORAL) Take by mouth daily.      ZINC ORAL Take by mouth daily.       No current facility-administered medications for this visit.       Allergies: No Known Allergies       Objective   Vitals: Visit Vitals  BP (!) 164/99 (BP Location: Right upper arm, Patient Position: Sitting)   Pulse 85   Temp 36.3 °C (97.3 °F)   Ht 1.778 m (5' 10\")   Wt 102 kg (225 lb)   SpO2 99%   BMI 32.28 kg/m²      General: He is in no distress and appears younger than his stated age  Nodes: No cervical or supraclavicular adenopathy   Pulmonary: Lung fields are clear bilaterally and there is good air exchange   Extremities: No clubbing cyanosis or edema      Imaging Review: We have independently evaluated his imaging studies and the reports.  He has a grossly stable pattern of interstitial lung disease and a stable 9 mm pleural-based nodular density at the right lung base.    Assessment   This 78-year-old male has a 9 mm nodule in the right lower lobe which is stable on repeat radiographic imaging.  He has significant subpleural fibrotic changes.  These are stable as well.  We reviewed the " films.  We will see him in 1 year for CT scan of the chest.

## 2024-08-19 NOTE — LETTER
8/19/2024    Re: Tobias Little  1945        Dear Dakotah Villalba MD    I appreciate the opportunity to evaluate  your patient Tobias Little in the office today.    Tobias Little is a delightful 78 y.o. old male former smoker (10-year history; quit 1980) who is presenting today for ongoing evaluation of a 9 mm nodule which was initially identified in 2019..     His ROS is notable for absence of cough, hemoptysis, dyspnea, exercise limitation, fever, and chills.  Appetite is good.  Weight is stable.  He has persistent left knee pain and is scheduled to have left knee arthroplasty November 16, 2024.  He has an upcoming 3-week trip to [place].      This 78-year-old male has a 9 mm nodule in the right lower lobe which is stable on repeat radiographic imaging.  He has significant subpleural fibrotic changes.  These are stable as well.  We reviewed the films.  We will see him in 1 year for CT scan of the chest.      Thank you for the opportunity to participate in his care.    Abnre Menchaca MD PhD

## 2024-08-27 PROBLEM — I47.19 ATRIAL TACHYCARDIA (CMS/HCC): Status: ACTIVE | Noted: 2019-02-23

## 2024-08-27 NOTE — PROGRESS NOTES
9/3/2024      Dakotah Villalba MD  [address]  [place] [address]    Manuel Nichole MD Esberg, Douglas B, MD     Re:  TOBIAS LITTLE  :  1945      Dear Doctors:    It was my pleasure to see Tobias Little in the cardiovascular office today.  We will follow Tobias for  subclinical coronary artery disease with an Agatston score of 648 with two vessel involvement, hypertension and polygenic hypercholesterolemia.    He  underwent right total knee replacement  at the [facility].  This was complicated by postoperative paroxysmal SVT.  He was discharged on flecainide 100 mg twice daily and Toprol- mg daily.  Ultimately he underwent a [employer] loop recorder and a subsequent ablation for atrial tachyarrhythmias. 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  He is now in persistent rate controlled atrial flutter and on a combination of Toprol-XL and Eliquis.     Tobias reported to this office after visiting his ophthalmologist.  Apparently he has had bleeding involving his right and left eyes. Workup was negative. This led to a rediscussion of his atrial flutter and anticoagulation.  He remains on Eliquis and Toprol-XL.    We reviewed his prevention program in detail.  His last lipid panel is noted below and is excellent.  Presently, he is on Crestor 10 mg daily.     There is evidence of insulin resistance.  We recommended a weight reduction diet.  His goal weight is 220 pounds.  His present weight is 231 pounds.  We discussed a low-carbohydrate, low-saturated fat Mediterranean Diet.    He was noticing some dyspnea on exertion in late .  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  He is in chronic atrial flutter with a baseline heart rate of 80.  We  scheduled him for stress echo was negative for ischemia. We had him undergo a PET scan to rule out ischemia 2024.  His scan showed a moderate size reversible  defect in the mid to apical anteroseptal walls consistent with ischemia.  Due to this result he was scheduled for cardiac catheterization which she had performed on 1/31/2024.  Cardiac catheterization demonstrated patent coronary arteries except for the distal LAD had a 40% lesion.  Medical management is indicated.    Presently Tobias is denying any chest pain or shortness of breath.  He is demonstrating cardiovascular stability.        PAST MEDICAL HISTORY:      Past Medical History:   Diagnosis Date    Arthritis     Atrial fibrillation (CMS/HCC)     Atrial tachycardia (CMS/HCC) 02/23/2019    Atypical atrial flutter (CMS/HCC) 12/25/2023    CAD (coronary artery disease) 05/14/2018    Coronary artery disease     Dilated aortic root (CMS/HCC) 09/03/2024    Dilated aortic root at 4.4 cm.    Essential hypertension 05/14/2018    History of traumatic subdural hematoma 08/20/2018 2008. 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    Partial small bowel obstruction (CMS/HCC)     Phrenic nerve paralysis     resolved    Renal cyst     Retinal hemorrhage     SVT (supraventricular tachycardia) (CMS/HCC)     Traumatic subdural hematoma (CMS/HCC) 2008     Past Surgical History:   Procedure Laterality Date    APPENDECTOMY      CARDIAC ELECTROPHYSIOLOGY STUDY AND ABLATION  05/2019    Atrial fibrillation: PVI, roof, lateral mitral isthmus, CFAE and CTI.  Subsequent phrenic nerve injury    CARDIAC ELECTROPHYSIOLOGY STUDY AND ABLATION  05/2019    Frequent PVCs from [facility]    HERNIA REPAIR  07/2021    inguinal hernia's   (X2)    KNEE SURGERY Right 07/2019    infection    REPLACEMENT TOTAL KNEE Right 02/2019    SUBDURAL HEMATOMA EVACUATION VIA  CRANIOTOMY      Neurosurgery for subdural hematoma      CURRENT MEDICATIONS:   Current Outpatient Medications   Medication Instructions    amLODIPine (NORVASC) 5 mg, oral, Daily    amoxicillin (AMOXIL) 500 mg capsule TAKE 4 CAPSULES BY MOUTH 1 HOUR PRIOR TO APPOINTMENT    apixaban (ELIQUIS) 5 mg, oral, 2 times daily    cholecalciferol, vitD3,/vit K2 (VITAMIN D3-VITAMIN K2 ORAL) oral, Daily    coenzyme Q10 (COQ10) 200 mg, oral, Daily    flaxseed oiL oil Other, Daily    latanoprost (XALATAN) 0.005 % ophthalmic solution 1 drop, Both Eyes, Nightly    lisinopriL (PRINIVIL) 40 mg, oral, Daily    metoprolol succinate XL (TOPROL-XL) 100 mg, oral, Daily    rosuvastatin (CRESTOR) 10 mg, oral, Daily    vitamin B complex (B COMPLEX ORAL) oral, Daily    white petrolatum-mineral oiL (LUBRIFRESH PM) 83-15 % ointment ophthalmic, Nightly    ZINC ORAL oral, Daily        SUPPLEMENTS:  Coenzyme-Q10 100 mg daily, vitamin-D 4597-3586 international units daily, omega-3 fish oil 1000 mg daily, multiple vitamin.    ALLERGIES:  No known drug allergies.    FAMILY HISTORY:  Father  young at age 55 of lung cancer.  Mother has a pacemaker.    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  His wife's name is Jaye.  They have three children.  He has seven grandchildren.  He smoked 38 years ago for five years.  He rarely drinks alcohol.  He has an active lifestyle.  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    REVIEW OF SYSTEMS: Tobias is now in persistent atrial flutter with rate control.    PHYSICAL EXAMINATION:  Vitals:    24 1103   BP: (!) 134/94   Pulse: 80   SpO2: 99%       Wt Readings from Last 3 Encounters:   24 105 kg (231 lb)   24 102 kg (225 lb)   24 102 kg (225 lb)     BP Readings from Last 3 Encounters:   24 (!) 134/94   24 (!) 164/99   24 (!) 147/92     Cardiovascular ROS: no chest pain or dyspnea  on exertion     LABORATORY DATA:      EKG:  Flutter rate 80    Coronary calcium score 2016:  648 with two vessel involvement.      Stress echo was negative for myocardial ischemia or scar, normal left ventricular size, preserved systolic function.    Myocardial perfusion scan February 2019: Normal    Echocardiogram February 2019: Normal left ventricular size preserved function.  Mild mitral and tricuspid regurgitation.  Ascending aorta measured 4.2 cm.    Echocardiogram September 2021:   The left ventricle is normal in size. Normal left ventricular wall thickness. There are no segmental wall motion abnormalities. Normal left ventricular ejection fraction. The left ventricular ejection fraction by visual estimate is 65% to 70%.   Unable to assess LV diastolic function due to arrhythmia.   The right ventricle is normal in size. There is normal function of the right ventricle.   There is no mitral regurgitation. There is no mitral stenosis.   There is no aortic stenosis. There is mild aortic regurgitation.   There is trace tricuspid regurgitation.   The aortic root is mildly enlarged. The aorta measures 4.2 cm at the sinus of Valsalva. The proximal segment of the ascending aorta is mildly enlarged. The proximal segment of the ascending aorta measures 4.0 cm.    Lipid panel:   Component      Latest Ref Rng 12/29/2021 7/21/2022 9/18/2023 8/29/2024   Triglycerides      <150 mg/dL 80  45  78  48    Cholesterol      <=200 mg/dL 149  152  137  134    HDL      >=40 mg/dL 62  62  60  66    LDL Calculated      <=100 mg/dL 71  81  61  58    Non-HDL, Calculated      mg/dL 87  90  77  68    RISK      <=5.0  2.4  2.5  2.3  2.0          The 10-year ASCVD risk score (Emilia SESAY, et al., 2019) is: 26.8%    Values used to calculate the score:      Age: 78 years      Sex: Male      Is Non- : No      Diabetic: No      Tobacco smoker: No      Systolic Blood Pressure: 134 mmHg      Is BP treated: Yes      HDL  Cholesterol: 66 mg/dL      Total Cholesterol: 134 mg/dL     IMPRESSIONS/RECOMMENDATIONS:    1. Coronary artery disease.  The patient's stress test was negative for ischemia.  He is not on aspirin as he is on Eliquis.  PET scan showed a reversible defect.  Cardiac catheterization showed 40% distal LAD lesion otherwise patent coronary arteries.  We will continue optimal medical management.    2. Hypertension.  Continue Toprol-XL and lisinopril and amlodipine.    3. Polygenic hypercholesterolemia.  The patient will continue his Crestor  10 mg daily.  Our goal is an LDL below 70.  Needs repeat lipid panel.    4. MTHFR polymorphism.  Continue B-complex with L5 methyl folate.    5. Insulin resistance.  Continue low carbohydrate low saturated fat Mediterranean diet and exercise program.    6. Obesity.   We discussed a low-carbohydrate, low-saturated fat Mediterranean Diet and an exercise walking program.  His goal weight is under 220 pounds.    7.         Persistent atrial flutter.  Status post A. fib ablation complicated by phrenic nerve paralysis.  Patient is now resigned to rate control and anticoagulation for his atrial flutter.  He is followed by Dr. Su who does not feel he will benefit from ablation of his atrial flutter.  He should remain on Eliquis.    8.         History of subdural hematoma.  This was secondary to head trauma and was not spontaneous.  He is on Eliquis now for his atrial flutter.    9.         History of phrenic nerve injury with diaphragmatic paralysis.  Resolved.    10.       History of total knee replacement.  Followed [facility].  He is cleared for a left total knee replacement.    11.       History of cold agglutinins.  Followed [facility].    12.       Chronic kidney disease stage IIIa.  Patient's recent creatinine is 1.1 with a GFR of 60      13.       Dilated aortic root.  The patient's recent echocardiogram demonstrated mild dilatation of the sinus of  Valsalva at 4.2 cm and ascending thoracic aorta at 4.0 cm.  A CT angiogram of the chest 2019 demonstrated normal aortic root at 3.9 cm.    14.       Dyspnea on exertion.  The patient does have a history of phrenic nerve paralysis as well as chronic atrial flutter with controlled ventricular response. .    Summary:.      Tobias is demonstrating cardiovascular stability.      We reviewed his cardiac history.  He now has chronic atrial flutter rate controlled with Toprol-XL and anticoagulated with Eliquis.      His blood pressures been reasonably well controlled with the use of amlodipine and lisinopril.     He was counseled on low carbohydrate low saturated fat Mediterranean diet.    He is cleared for left knee surgery.    This was a 30 minute patient encounter with greater than 50% time spent in care coordination and counseling.       Sincerely,    Manuel Nichole MD    09/03/24

## 2024-08-29 ENCOUNTER — APPOINTMENT (OUTPATIENT)
Dept: LAB | Facility: CLINIC | Age: 79
End: 2024-08-29
Attending: NURSE PRACTITIONER
Payer: COMMERCIAL

## 2024-08-29 DIAGNOSIS — E78.00 HYPERCHOLESTEROLEMIA: ICD-10-CM

## 2024-08-29 LAB
ALBUMIN SERPL-MCNC: 4.4 G/DL (ref 3.5–5.7)
ALP SERPL-CCNC: 66 IU/L (ref 34–125)
ALT SERPL-CCNC: 15 IU/L (ref 7–52)
ANION GAP SERPL CALC-SCNC: 7 MEQ/L (ref 3–15)
AST SERPL-CCNC: 18 IU/L (ref 13–39)
BILIRUB SERPL-MCNC: 0.8 MG/DL (ref 0.3–1.2)
BUN SERPL-MCNC: 15 MG/DL (ref 7–25)
CALCIUM SERPL-MCNC: 9.3 MG/DL (ref 8.6–10.3)
CHLORIDE SERPL-SCNC: 103 MEQ/L (ref 98–107)
CHOLEST SERPL-MCNC: 134 MG/DL
CO2 SERPL-SCNC: 30 MEQ/L (ref 21–31)
CREAT SERPL-MCNC: 1.1 MG/DL (ref 0.7–1.3)
EGFRCR SERPLBLD CKD-EPI 2021: >60 ML/MIN/1.73M*2
GLUCOSE SERPL-MCNC: 103 MG/DL (ref 70–99)
HDLC SERPL-MCNC: 66 MG/DL
HDLC SERPL: 2 {RATIO}
LDLC SERPL CALC-MCNC: 58 MG/DL
NONHDLC SERPL-MCNC: 68 MG/DL
POTASSIUM SERPL-SCNC: 4.4 MEQ/L (ref 3.5–5.1)
PROT SERPL-MCNC: 6.9 G/DL (ref 6–8.2)
SODIUM SERPL-SCNC: 140 MEQ/L (ref 136–145)
TRIGL SERPL-MCNC: 48 MG/DL

## 2024-08-29 PROCEDURE — 82465 ASSAY BLD/SERUM CHOLESTEROL: CPT

## 2024-08-29 PROCEDURE — 36415 COLL VENOUS BLD VENIPUNCTURE: CPT

## 2024-08-29 PROCEDURE — 80053 COMPREHEN METABOLIC PANEL: CPT

## 2024-08-29 PROCEDURE — 80061 LIPID PANEL: CPT

## 2024-09-03 ENCOUNTER — OFFICE VISIT (OUTPATIENT)
Dept: CARDIOLOGY | Facility: CLINIC | Age: 79
End: 2024-09-03
Payer: COMMERCIAL

## 2024-09-03 VITALS
HEART RATE: 80 BPM | SYSTOLIC BLOOD PRESSURE: 134 MMHG | BODY MASS INDEX: 33.07 KG/M2 | WEIGHT: 231 LBS | OXYGEN SATURATION: 99 % | HEIGHT: 70 IN | DIASTOLIC BLOOD PRESSURE: 94 MMHG

## 2024-09-03 DIAGNOSIS — I25.10 CORONARY ARTERY DISEASE INVOLVING NATIVE CORONARY ARTERY OF NATIVE HEART WITHOUT ANGINA PECTORIS: Primary | ICD-10-CM

## 2024-09-03 DIAGNOSIS — I77.810 DILATED AORTIC ROOT (CMS/HCC): ICD-10-CM

## 2024-09-03 DIAGNOSIS — E78.00 HYPERCHOLESTEROLEMIA: ICD-10-CM

## 2024-09-03 LAB
ATRIAL RATE: 227
P AXIS: 85
QRS DURATION: 96
QT INTERVAL: 392
QTC CALCULATION(BAZETT): 452
R AXIS: 67
T WAVE AXIS: 55
VENTRICULAR RATE: 80

## 2024-09-03 PROCEDURE — 3075F SYST BP GE 130 - 139MM HG: CPT | Performed by: INTERNAL MEDICINE

## 2024-09-03 PROCEDURE — 99214 OFFICE O/P EST MOD 30 MIN: CPT | Performed by: INTERNAL MEDICINE

## 2024-09-03 PROCEDURE — 3080F DIAST BP >= 90 MM HG: CPT | Performed by: INTERNAL MEDICINE

## 2024-09-03 PROCEDURE — 3008F BODY MASS INDEX DOCD: CPT | Performed by: INTERNAL MEDICINE

## 2024-09-03 PROCEDURE — 93000 ELECTROCARDIOGRAM COMPLETE: CPT | Performed by: INTERNAL MEDICINE

## 2024-09-03 NOTE — Clinical Note
Cleared patient for knee surgery in November with Dr Rose. Instructed patient to hold eliquis two days prior

## 2024-09-03 NOTE — LETTER
September 3, 2024     Jake Rose MD  [address] [facility]  [address] [facility] [address]    Patient: Tobias Valverde  YOB: 1945  Date of Visit: 9/3/2024      Dear Dr. Rose:    Thank you for referring Tobias Valverde to me for evaluation. Below are my notes for this consultation.      Patient is cleared for knee replacement in November.    If you have questions, please do not hesitate to call me. I look forward to following your patient along with you.         Sincerely,      MD Dayanara Fernando Thomas P, MD  9/3/2024  1:28 PM  Sign when Signing Visit  9/3/2024      Dakotah Villalba MD  [address] [address]    Manuel Nichole MD Esberg, Douglas B, MD     Re:  TOBIAS VALVERDE  :  1945      Dear Doctors:    It was my pleasure to see Tobias Valverde in the cardiovascular office today.  We will follow Tobias for  subclinical coronary artery disease with an Agatston score of 648 with two vessel involvement, hypertension and polygenic hypercholesterolemia.    He  underwent right total knee replacement  at the [facility].  This was complicated by postoperative paroxysmal SVT.  He was discharged on flecainide 100 mg twice daily and Toprol- mg daily.  Ultimately he underwent a [employer] loop recorder and a subsequent ablation for atrial tachyarrhythmias. 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  He is now in persistent rate controlled atrial flutter and on a combination of Toprol-XL and Eliquis.     Tobias reported to this office after visiting his ophthalmologist.  Apparently he has had bleeding involving his right and left eyes. Workup was negative. This led to a rediscussion of his atrial flutter and anticoagulation.  He remains on Eliquis and Toprol-XL.    We reviewed his prevention program in detail.  His last lipid panel is noted below and is excellent.   Presently, he is on Crestor 10 mg daily.     There is evidence of insulin resistance.  We recommended a weight reduction diet.  His goal weight is 220 pounds.  His present weight is 231 pounds.  We discussed a low-carbohydrate, low-saturated fat Mediterranean Diet.    He was noticing some dyspnea on exertion in late 2023.  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  He is in chronic atrial flutter with a baseline heart rate of 80.  We  scheduled him for stress echo was negative for ischemia. We had him undergo a PET scan to rule out ischemia January 2024.  His scan showed a moderate size reversible defect in the mid to apical anteroseptal walls consistent with ischemia.  Due to this result he was scheduled for cardiac catheterization which she had performed on 1/31/2024.  Cardiac catheterization demonstrated patent coronary arteries except for the distal LAD had a 40% lesion.  Medical management is indicated.    Presently Tobias is denying any chest pain or shortness of breath.  He is demonstrating cardiovascular stability.        PAST MEDICAL HISTORY:      Past Medical History:   Diagnosis Date   • Arthritis    • Atrial fibrillation (CMS/HCC)    • Atrial tachycardia (CMS/HCC) 02/23/2019   • Atypical atrial flutter (CMS/HCC) 12/25/2023   • CAD (coronary artery disease) 05/14/2018   • Coronary artery disease    • Dilated aortic root (CMS/HCC) 09/03/2024    Dilated aortic root at 4.4 cm.   • Essential hypertension 05/14/2018   • History of traumatic subdural hematoma 08/20/2018 2008. 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   • Partial small bowel obstruction (CMS/HCC)    • Phrenic nerve paralysis      resolved   • Renal cyst    • Retinal hemorrhage    • SVT (supraventricular tachycardia) (CMS/HCC)    • Traumatic subdural hematoma (CMS/HCC)      Past Surgical History:   Procedure Laterality Date   • APPENDECTOMY     • CARDIAC ELECTROPHYSIOLOGY STUDY AND ABLATION  2019    Atrial fibrillation: PVI, roof, lateral mitral isthmus, CFAE and CTI.  Subsequent phrenic nerve injury   • CARDIAC ELECTROPHYSIOLOGY STUDY AND ABLATION  2019    Frequent PVCs from [facility]   • HERNIA REPAIR  2021    inguinal hernia's   (X2)   • KNEE SURGERY Right 2019    infection   • REPLACEMENT TOTAL KNEE Right 2019   • SUBDURAL HEMATOMA EVACUATION VIA CRANIOTOMY      Neurosurgery for subdural hematoma      CURRENT MEDICATIONS:   Current Outpatient Medications   Medication Instructions   • amLODIPine (NORVASC) 5 mg, oral, Daily   • amoxicillin (AMOXIL) 500 mg capsule TAKE 4 CAPSULES BY MOUTH 1 HOUR PRIOR TO APPOINTMENT   • apixaban (ELIQUIS) 5 mg, oral, 2 times daily   • cholecalciferol, vitD3,/vit K2 (VITAMIN D3-VITAMIN K2 ORAL) oral, Daily   • coenzyme Q10 (COQ10) 200 mg, oral, Daily   • flaxseed oiL oil Other, Daily   • latanoprost (XALATAN) 0.005 % ophthalmic solution 1 drop, Both Eyes, Nightly   • lisinopriL (PRINIVIL) 40 mg, oral, Daily   • metoprolol succinate XL (TOPROL-XL) 100 mg, oral, Daily   • rosuvastatin (CRESTOR) 10 mg, oral, Daily   • vitamin B complex (B COMPLEX ORAL) oral, Daily   • white petrolatum-mineral oiL (LUBRIFRESH PM) 83-15 % ointment ophthalmic, Nightly   • ZINC ORAL oral, Daily        SUPPLEMENTS:  Coenzyme-Q10 100 mg daily, vitamin-D 5273-1960 international units daily, omega-3 fish oil 1000 mg daily, multiple vitamin.    ALLERGIES:  No known drug allergies.    FAMILY HISTORY:  Father  young at age 55 of lung cancer.  Mother has a pacemaker.    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  His wife's name is Jaye.  They have three  children.  He has seven grandchildren.  He smoked 38 years ago for five years.  He rarely drinks alcohol.  He has an active lifestyle.  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    REVIEW OF SYSTEMS: Tobias is now in persistent atrial flutter with rate control.    PHYSICAL EXAMINATION:  Vitals:    09/03/24 1103   BP: (!) 134/94   Pulse: 80   SpO2: 99%       Wt Readings from Last 3 Encounters:   09/03/24 105 kg (231 lb)   08/19/24 102 kg (225 lb)   06/03/24 102 kg (225 lb)     BP Readings from Last 3 Encounters:   09/03/24 (!) 134/94   08/19/24 (!) 164/99   06/03/24 (!) 147/92     Cardiovascular ROS: {[id number]}     LABORATORY DATA:      EKG:  Flutter rate 80    Coronary calcium score 2016:  648 with two vessel involvement.      Stress echo was negative for myocardial ischemia or scar, normal left ventricular size, preserved systolic function.    Myocardial perfusion scan February 2019: Normal    Echocardiogram February 2019: Normal left ventricular size preserved function.  Mild mitral and tricuspid regurgitation.  Ascending aorta measured 4.2 cm.    Echocardiogram September 2021:  • The left ventricle is normal in size. Normal left ventricular wall thickness. There are no segmental wall motion abnormalities. Normal left ventricular ejection fraction. The left ventricular ejection fraction by visual estimate is 65% to 70%.  • Unable to assess LV diastolic function due to arrhythmia.  • The right ventricle is normal in size. There is normal function of the right ventricle.  • There is no mitral regurgitation. There is no mitral stenosis.  • There is no aortic stenosis. There is mild aortic regurgitation.  • There is trace tricuspid regurgitation.  • The aortic root is mildly enlarged. The aorta measures 4.2 cm at the sinus of Valsalva. The proximal segment of the ascending aorta is mildly enlarged. The proximal segment of the ascending aorta measures 4.0 cm.    Lipid panel:    Component      Latest Ref Rng 12/29/2021 7/21/2022 9/18/2023 8/29/2024   Triglycerides      <150 mg/dL 80  45  78  48    Cholesterol      <=200 mg/dL 149  152  137  134    HDL      >=40 mg/dL 62  62  60  66    LDL Calculated      <=100 mg/dL 71  81  61  58    Non-HDL, Calculated      mg/dL 87  90  77  68    RISK      <=5.0  2.4  2.5  2.3  2.0          The 10-year ASCVD risk score (Emilia SESAY, et al., 2019) is: 26.8%    Values used to calculate the score:      Age: 78 years      Sex: Male      Is Non- : No      Diabetic: No      Tobacco smoker: No      Systolic Blood Pressure: 134 mmHg      Is BP treated: Yes      HDL Cholesterol: 66 mg/dL      Total Cholesterol: 134 mg/dL     IMPRESSIONS/RECOMMENDATIONS:    1. Coronary artery disease.  The patient's stress test was negative for ischemia.  He is not on aspirin as he is on Eliquis.  PET scan showed a reversible defect.  Cardiac catheterization showed 40% distal LAD lesion otherwise patent coronary arteries.  We will continue optimal medical management.    2. Hypertension.  Continue Toprol-XL and lisinopril and amlodipine.    3. Polygenic hypercholesterolemia.  The patient will continue his Crestor  10 mg daily.  Our goal is an LDL below 70.  Needs repeat lipid panel.    4. MTHFR polymorphism.  Continue B-complex with L5 methyl folate.    5. Insulin resistance.  Continue low carbohydrate low saturated fat Mediterranean diet and exercise program.    6. Obesity.   We discussed a low-carbohydrate, low-saturated fat Mediterranean Diet and an exercise walking program.  His goal weight is under 220 pounds.    7.         Persistent atrial flutter.  Status post A. fib ablation complicated by phrenic nerve paralysis.  Patient is now resigned to rate control and anticoagulation for his atrial flutter.  He is followed by Dr. Su who does not feel he will benefit from ablation of his atrial flutter.  He should remain on Eliquis.    8.         History of  subdural hematoma.  This was secondary to head trauma and was not spontaneous.  He is on Eliquis now for his atrial flutter.    9.         History of phrenic nerve injury with diaphragmatic paralysis.  Resolved.    10.       History of total knee replacement.  Followed [facility].  He is cleared for a left total knee replacement.    11.       History of cold agglutinins.  Followed [facility].    12.       Chronic kidney disease stage IIIa.  Patient's recent creatinine is 1.1 with a GFR of 60      13.       Dilated aortic root.  The patient's recent echocardiogram demonstrated mild dilatation of the sinus of Valsalva at 4.2 cm and ascending thoracic aorta at 4.0 cm.  A CT angiogram of the chest 2019 demonstrated normal aortic root at 3.9 cm.    14.       Dyspnea on exertion.  The patient does have a history of phrenic nerve paralysis as well as chronic atrial flutter with controlled ventricular response. .    Summary:.      Tobias is demonstrating cardiovascular stability.      We reviewed his cardiac history.  He now has chronic atrial flutter rate controlled with Toprol-XL and anticoagulated with Eliquis.      His blood pressures been reasonably well controlled with the use of amlodipine and lisinopril.     He was counseled on low carbohydrate low saturated fat Mediterranean diet.    He is cleared for left knee surgery.    This was a 30 minute patient encounter with greater than 50% time spent in care coordination and counseling.       Sincerely,    Manuel Nichole MD    09/03/24

## 2024-09-04 ENCOUNTER — TELEPHONE (OUTPATIENT)
Dept: SCHEDULING | Facility: CLINIC | Age: 79
End: 2024-09-04

## 2024-09-04 NOTE — TELEPHONE ENCOUNTER
Cardiac Clearance     Name of caller: Tobias Little    Relationship to patient: self    Name of patient: Tobias Little    Insurance Name: Personal choice    Name of physician: Alan Su MD    Date of Procedure/Surgery: 11/18/24    Type of Procedure/Surgery: TOTAL KNEE ARTHROPLASTY CONDYLE AND PLATEAU MEDIAL AND LATERAL COMPARTMENTS    Name of surgeon: Jake Rose    Office contact number: [phone]     Office fax number: [phone]     Addendums  If unable to clear patient, please contact patient at [phone]  .  Additional notes: pt is looking for a sooner appt with MD

## 2024-10-22 ENCOUNTER — TELEPHONE (OUTPATIENT)
Dept: SCHEDULING | Facility: CLINIC | Age: 79
End: 2024-10-22
Payer: COMMERCIAL

## 2024-10-22 NOTE — TELEPHONE ENCOUNTER
Pt's spouse Jaye called, wants to speak with a nurse.    Leida states the pt recently had an EKG with [facility] that showed he has Atrial Fibrillation.    Jaye can be reached at [phone]

## 2024-10-22 NOTE — TELEPHONE ENCOUNTER
Spoke with spouse reviewed EKGs.  There are similar since 2019.  No reason he cannot go through with his surgery.

## 2024-12-15 PROBLEM — I47.19 ATRIAL TACHYCARDIA (CMS/HCC): Status: RESOLVED | Noted: 2019-02-23 | Resolved: 2024-12-15

## 2024-12-15 PROBLEM — R35.1 NOCTURIA: Status: RESOLVED | Noted: 2019-04-03 | Resolved: 2024-12-15

## 2024-12-15 PROBLEM — Z87.828 HISTORY OF TRAUMATIC SUBDURAL HEMATOMA: Status: RESOLVED | Noted: 2018-08-20 | Resolved: 2024-12-15

## 2024-12-16 ENCOUNTER — OFFICE VISIT (OUTPATIENT)
Dept: CARDIOLOGY | Facility: CLINIC | Age: 79
End: 2024-12-16
Payer: COMMERCIAL

## 2024-12-16 VITALS
WEIGHT: 233 LBS | HEART RATE: 103 BPM | DIASTOLIC BLOOD PRESSURE: 66 MMHG | SYSTOLIC BLOOD PRESSURE: 140 MMHG | HEIGHT: 70 IN | BODY MASS INDEX: 33.36 KG/M2

## 2024-12-16 DIAGNOSIS — I10 PRIMARY HYPERTENSION: ICD-10-CM

## 2024-12-16 DIAGNOSIS — I47.10 SVT (SUPRAVENTRICULAR TACHYCARDIA) (CMS/HCC): ICD-10-CM

## 2024-12-16 DIAGNOSIS — I25.10 CORONARY ARTERY DISEASE INVOLVING NATIVE CORONARY ARTERY OF NATIVE HEART WITHOUT ANGINA PECTORIS: ICD-10-CM

## 2024-12-16 DIAGNOSIS — I48.0 PAROXYSMAL ATRIAL FIBRILLATION (CMS/HCC): ICD-10-CM

## 2024-12-16 DIAGNOSIS — I48.4 ATYPICAL ATRIAL FLUTTER (CMS/HCC): Primary | ICD-10-CM

## 2024-12-16 LAB
P AXIS: 62
QRS DURATION: 88
QT INTERVAL: 380
QTC CALCULATION(BAZETT): 497
R AXIS: 56
T WAVE AXIS: 0
VENTRICULAR RATE: 103

## 2024-12-16 PROCEDURE — 99214 OFFICE O/P EST MOD 30 MIN: CPT | Performed by: INTERNAL MEDICINE

## 2024-12-16 PROCEDURE — 93000 ELECTROCARDIOGRAM COMPLETE: CPT | Performed by: INTERNAL MEDICINE

## 2024-12-16 PROCEDURE — 3078F DIAST BP <80 MM HG: CPT | Performed by: INTERNAL MEDICINE

## 2024-12-16 PROCEDURE — 3008F BODY MASS INDEX DOCD: CPT | Performed by: INTERNAL MEDICINE

## 2024-12-16 PROCEDURE — 3077F SYST BP >= 140 MM HG: CPT | Performed by: INTERNAL MEDICINE

## 2024-12-16 RX ORDER — METOPROLOL SUCCINATE 100 MG/1
100 TABLET, EXTENDED RELEASE ORAL DAILY
COMMUNITY
Start: 2024-12-16

## 2024-12-16 ASSESSMENT — ENCOUNTER SYMPTOMS
DIARRHEA: 0
ACTIVITY CHANGE: 0
FATIGUE: 0
ARTHRALGIAS: 0
CONFUSION: 0
DIZZINESS: 0
MYALGIAS: 0
COUGH: 0
WHEEZING: 0
BACK PAIN: 0
ABDOMINAL PAIN: 0

## 2024-12-16 NOTE — ASSESSMENT & PLAN NOTE
His blood pressure remains mildly elevated.  He will keep an eye on it as he increases his metoprolol dose.

## 2024-12-16 NOTE — PROGRESS NOTES
Electrophysiology       Reason for visit:   Chief Complaint   Patient presents with    Atrial Fibrillation      HPI  Tobias Little is a 79 y.o. male who comes to the office today for follow-up of atrial fibrillation and atrial flutter.  Since his last office visit he has been feeling well with no chest pains, palpitations, lightheadedness or syncope.  Recently, he has noticed his resting heart rate is significantly faster and is often over 100 bpm.  His systolic blood pressure used to run close to 140 mmHg and now has been running more like 120 mmHg.  Despite this change in numbers he has felt exactly the same with no chest pains, palpitations, lightheadedness or syncope.      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  Subsequent phrenic nerve injury    Hernia repair  07/2021    inguinal hernia's   (X2)    Knee surgery Right 07/2019    infection    Replacement total knee Right 02/2019    Subdural hematoma evacuation via craniotomy      Neurosurgery for subdural hematoma      Allergies:  Patient has no known allergies.    Current Medications[1]  Social History[2]  Family History   Problem Relation Name Age of Onset    Heart disease  Biological Mother      Lung cancer Biological Father       Review of Systems   Constitutional:  Negative for activity change and fatigue.   HENT:  Negative for hearing loss.    Eyes:  Negative for visual disturbance.   Respiratory:  Negative for cough and wheezing.    Cardiovascular:  Negative for chest pain.   Gastrointestinal:  Negative for abdominal pain and diarrhea.   Musculoskeletal:  Negative for arthralgias, back pain and myalgias.   Skin:  Negative for rash.   Neurological:  Negative for dizziness and syncope.   Psychiatric/Behavioral:  Negative for behavioral problems and confusion.      Vitals:    12/16/24 1448   BP: (!) 140/66   Pulse: (!) 103     Wt Readings from Last 3 Encounters:   12/16/24 106 kg (233 lb)   09/03/24 105 kg (231 lb)   08/19/24 102 kg (225 lb)     Physical Exam  Constitutional:       General: He is not in acute distress.     Appearance: He is well-developed.   HENT:      Head: Atraumatic.   Eyes:      General: No scleral icterus.  Neck:      Thyroid: No thyromegaly.   Cardiovascular:      Rate and Rhythm: Tachycardia present. Rhythm irregularly irregular.      Heart sounds: No murmur heard.  Pulmonary:      Effort: No respiratory distress.      Breath sounds: Normal breath sounds.   Abdominal:      Palpations: Abdomen is soft.      Tenderness: There is no abdominal tenderness.   Musculoskeletal:         General: No deformity.   Skin:     Findings: No rash.   Neurological:      Mental Status: He is alert.      Motor: No abnormal muscle tone.          Labs and test results personally reviewed by me:    Lab Results   Component Value Date    WBC 8.53 01/29/2024    HGB 12.2 (L) 01/29/2024    HCT  01/29/2024      Comment:      Unable to report due to suspected COLD AGGLUTININ.      01/29/2024    CHOL 134 08/29/2024    TRIG 48 08/29/2024    HDL 66 08/29/2024    LDLCALC 58 08/29/2024    ALT 15 08/29/2024    AST 18 08/29/2024     08/29/2024    K 4.4 08/29/2024    CREATININE 1.1  08/29/2024    BUN 15 08/29/2024         Most recent stress test results:    CV NUCLEAR PET/CT MYOCARDIAL PERFUSION - REST/STRESS 01/25/2024 (Final) 1/25/2024    Interpretation Summary    There is a moderate size, moderate to severe intensity reversible defect in the mid to apical anteroseptal walls consistent with ischemia.    Normal left ventricle size, ejection fraction and wall motion. Ejection fraction at rest is 51% and EF with stress is 51%.  Abnormal ejection fraction reserve of 0.    Abnormal global blood flow reserve of 1.50.   Stress regional blood flow reserve globally reduced.    Total Agatston score: 1,091. LM: 44  LAD: 372 LCX: 93 RCA: 582. This places the patient in the RICK 78% risk percentile for age and gender matched individuals.    ECG was non-diagnostic per pharmacologic protocol. There was no ST segment deviation noted during stress. Arrhythmias noted during stress:  occasional PVCs.    Prior Study: No prior study available for comparison.    Most recent cardiac cath results:    LEFT HEART CATH WITH CORONARY ANGIOGRAPHY, IFR - INITIAL VESSEL 02/04/2024 (Final) 1/31/2024    Conclusion  40% distal LAD stenosis; iFR negative (0.95)    Minimal luminal irregularities in remainder of the coronary tree.    Recommendations:  Optimal medical therapy.      ECG personally read and interpreted by me today: Atrial fibrillation (cannot rule out atypical atrial flutter) with an average ventricular rate of 103 bpm and nonspecific ST segment abnormality.         Paroxysmal atrial fibrillation (CMS/HCC)  He has a history of paroxysmal atrial fibrillation and underwent extensive ablation in 2019 with pulmonary vein isolation, multiple left atrial lines and a PVC ablation at the same setting.  He suffered phrenic nerve damage from that and it took him almost a year before recovering.  He now remains entirely asymptomatic with evidence that his atrial fibrillation is intermittent along with episodes of atypical  atrial flutter.  Today he appears to be in atrial fibrillation with rapid ventricular response.  He checks his heart rate and blood pressure at least once a day and heart rate has been persistently elevated for at least a month.    At this point, I still plan to aim for rate control.  I will increase his metoprolol to 100 mg twice daily.  He will continue monitoring his heart rate and blood pressure and if the heart rate does not come down significantly we will consider cardioversion and antiarrhythmic drug therapy.  I am hesitant to offer him a repeat catheter ablation in the absence of symptoms and with complications related to his prior procedure.    Atypical atrial flutter (CMS/HCC)  He has shown episodes of atypical atrial flutter, which is not surprising given the multiple left atrial ablation lines performed at the time of his last procedure.  His ECG today is probably fibrillation, but could represent atypical atrial flutter.  In either case, I am aiming for rate control for now as noted above.  I would consider cardioversion and antiarrhythmic drug therapy if we cannot achieve reasonable rate control.    SVT (supraventricular tachycardia)  He had some form of SVT in 2019.  He underwent extensive catheter ablation targeted atrial fibrillation, atrial flutter and frequent PVCs.  There is no evidence of either atrial tachycardia or some other reentrant SVT.    Primary hypertension  His blood pressure remains mildly elevated.  He will keep an eye on it as he increases his metoprolol dose.        This letter was generated using speech recognition software. Please excuse any typographical errors.       Alan Su MD  12/16/2024                          [1]   Current Outpatient Medications   Medication Sig Dispense Refill    amLODIPine (NORVASC) 5 mg tablet Take 5 mg by mouth daily.      amoxicillin (AMOXIL) 500 mg capsule TAKE 4 CAPSULES BY MOUTH 1 HOUR PRIOR TO APPOINTMENT      apixaban (ELIQUIS) 5 mg  tablet Take 5 mg by mouth 2 (two) times a day.      cholecalciferol, vitD3,/vit K2 (VITAMIN D3-VITAMIN K2 ORAL) Take by mouth daily.      coenzyme Q10 (COQ10) 200 mg capsule Take 200 mg by mouth daily.        latanoprost (XALATAN) 0.005 % ophthalmic solution Administer 1 drop into both eyes nightly.  6    lisinopriL 40 mg tablet Take 40 mg by mouth daily.      metoprolol succinate XL (TOPROL-XL) 100 mg 24 hr tablet Take 1 tablet (100 mg total) by mouth 2 (two) times a day.      rosuvastatin (CRESTOR) 10 mg tablet Take 10 mg by mouth daily.      vitamin B complex (B COMPLEX ORAL) Take by mouth daily.      white petrolatum-mineral oiL (LUBRIFRESH PM) 83-15 % ointment Apply to affected eye(s) nightly.      ZINC ORAL Take by mouth daily.       No current facility-administered medications for this visit.   [2]   Social History  Socioeconomic History    Marital status:      Spouse name: None    Number of children: None    Years of education: None    Highest education level: None   Tobacco Use    Smoking status: Former    Smokeless tobacco: Former    Tobacco comments:     Pt Quit Smoking over 40 years ago    Vaping Use    Vaping status: Never Used   Substance and Sexual Activity    Alcohol use: Yes     Comment: Occassionally     Drug use: No    Sexual activity: Defer     Social Drivers of Health     Food Insecurity: No Food Insecurity (7/29/2023)    Hunger Vital Sign     Worried About Running Out of Food in the Last Year: Never true     Ran Out of Food in the Last Year: Never true   Transportation Needs: No Transportation Needs (7/30/2023)    PRAPARE - Transportation     Lack of Transportation (Medical): No     Lack of Transportation (Non-Medical): No   Housing Stability: Low Risk  (7/30/2023)    Housing Stability Vital Sign     Unable to Pay for Housing in the Last Year: No     Number of Places Lived in the Last Year: 1     Unstable Housing in the Last Year: No

## 2024-12-16 NOTE — LETTER
December 16, 2024     Dakotah Villalba MD  [address]  [place] [address]    Patient: Tobias Little  YOB: 1945  Date of Visit: 12/16/2024      Dear Dr. Villalba:    Thank you for referring Tobias Little to me for evaluation. Below are my notes for this consultation.    If you have questions, please do not hesitate to call me. I look forward to following your patient along with you.         Sincerely,        Alan Su MD        CC: MD Georges Trevizo, Alan WOODS MD  12/16/2024  3:21 PM  Sign when Signing Visit       Electrophysiology       Reason for visit:   Chief Complaint   Patient presents with   • Atrial Fibrillation      HPI  Tobias Little is a 79 y.o. male who comes to the office today for follow-up of atrial fibrillation and atrial flutter.  Since his last office visit he has been feeling well with no chest pains, palpitations, lightheadedness or syncope.  Recently, he has noticed his resting heart rate is significantly faster and is often over 100 bpm.  His systolic blood pressure used to run close to 140 mmHg and now has been running more like 120 mmHg.  Despite this change in numbers he has felt exactly the same with no chest pains, palpitations, lightheadedness or syncope.      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  Subsequent phrenic nerve injury   • Hernia repair  07/2021    inguinal hernia's   (X2)   • Knee surgery Right 07/2019    infection   • Replacement total knee Right 02/2019   • Subdural hematoma evacuation via craniotomy      Neurosurgery for subdural hematoma      Allergies:  Patient has no known allergies.    Current Medications[1]  Social History[2]  Family History   Problem Relation Name Age of Onset   • Heart disease Biological Mother     • Lung cancer Biological Father       Review of Systems   Constitutional:  Negative for activity change and fatigue.   HENT:  Negative for hearing loss.    Eyes:  Negative for visual disturbance.   Respiratory:  Negative for cough and wheezing.    Cardiovascular:  Negative for chest pain.   Gastrointestinal:  Negative for abdominal pain and diarrhea.   Musculoskeletal:  Negative for arthralgias, back pain and myalgias.   Skin:  Negative for rash.   Neurological:  Negative for dizziness and syncope.   Psychiatric/Behavioral:  Negative for behavioral problems and confusion.      Vitals:    12/16/24 1448   BP: (!) 140/66   Pulse: (!) 103     Wt Readings from Last 3 Encounters:   12/16/24 106 kg (233 lb)   09/03/24 105 kg (231 lb)   08/19/24 102 kg (225 lb)     Physical Exam  Constitutional:       General: He is not in acute distress.     Appearance: He is well-developed.   HENT:      Head: Atraumatic.   Eyes:      General: No scleral icterus.  Neck:      Thyroid: No thyromegaly.   Cardiovascular:      Rate and Rhythm: Tachycardia present. Rhythm irregularly irregular.      Heart sounds: No murmur heard.  Pulmonary:      Effort: No respiratory distress.      Breath sounds: Normal breath sounds.   Abdominal:      Palpations: Abdomen is soft.      Tenderness: There is no abdominal tenderness.    Musculoskeletal:         General: No deformity.   Skin:     Findings: No rash.   Neurological:      Mental Status: He is alert.      Motor: No abnormal muscle tone.          Labs and test results personally reviewed by me:    Lab Results   Component Value Date    WBC 8.53 01/29/2024    HGB 12.2 (L) 01/29/2024    HCT  01/29/2024      Comment:      Unable to report due to suspected COLD AGGLUTININ.      01/29/2024    CHOL 134 08/29/2024    TRIG 48 08/29/2024    HDL 66 08/29/2024    LDLCALC 58 08/29/2024    ALT 15 08/29/2024    AST 18 08/29/2024     08/29/2024    K 4.4 08/29/2024    CREATININE 1.1 08/29/2024    BUN 15 08/29/2024         Most recent stress test results:    CV NUCLEAR PET/CT MYOCARDIAL PERFUSION - REST/STRESS 01/25/2024 (Final) 1/25/2024    Interpretation Summary  •  There is a moderate size, moderate to severe intensity reversible defect in the mid to apical anteroseptal walls consistent with ischemia.  •  Normal left ventricle size, ejection fraction and wall motion. Ejection fraction at rest is 51% and EF with stress is 51%.  Abnormal ejection fraction reserve of 0.  •  Abnormal global blood flow reserve of 1.50.   Stress regional blood flow reserve globally reduced.  •  Total Agatston score: 1,091. LM: 44  LAD: 372 LCX: 93 RCA: 582. This places the patient in the RICK 78% risk percentile for age and gender matched individuals.  •  ECG was non-diagnostic per pharmacologic protocol. There was no ST segment deviation noted during stress. Arrhythmias noted during stress:  occasional PVCs.  •  Prior Study: No prior study available for comparison.    Most recent cardiac cath results:    LEFT HEART CATH WITH CORONARY ANGIOGRAPHY, IFR - INITIAL VESSEL 02/04/2024 (Final) 1/31/2024    Conclusion  40% distal LAD stenosis; iFR negative (0.95)    Minimal luminal irregularities in remainder of the coronary tree.    Recommendations:  Optimal medical therapy.      ECG personally read and interpreted  by me today: Atrial fibrillation (cannot rule out atypical atrial flutter) with an average ventricular rate of 103 bpm and nonspecific ST segment abnormality.         Paroxysmal atrial fibrillation (CMS/HCC)  He has a history of paroxysmal atrial fibrillation and underwent extensive ablation in 2019 with pulmonary vein isolation, multiple left atrial lines and a PVC ablation at the same setting.  He suffered phrenic nerve damage from that and it took him almost a year before recovering.  He now remains entirely asymptomatic with evidence that his atrial fibrillation is intermittent along with episodes of atypical atrial flutter.  Today he appears to be in atrial fibrillation with rapid ventricular response.  He checks his heart rate and blood pressure at least once a day and heart rate has been persistently elevated for at least a month.    At this point, I still plan to aim for rate control.  I will increase his metoprolol to 100 mg twice daily.  He will continue monitoring his heart rate and blood pressure and if the heart rate does not come down significantly we will consider cardioversion and antiarrhythmic drug therapy.  I am hesitant to offer him a repeat catheter ablation in the absence of symptoms and with complications related to his prior procedure.    Atypical atrial flutter (CMS/HCC)  He has shown episodes of atypical atrial flutter, which is not surprising given the multiple left atrial ablation lines performed at the time of his last procedure.  His ECG today is probably fibrillation, but could represent atypical atrial flutter.  In either case, I am aiming for rate control for now as noted above.  I would consider cardioversion and antiarrhythmic drug therapy if we cannot achieve reasonable rate control.    SVT (supraventricular tachycardia)  He had some form of SVT in 2019.  He underwent extensive catheter ablation targeted atrial fibrillation, atrial flutter and frequent PVCs.  There is no evidence  of either atrial tachycardia or some other reentrant SVT.    Primary hypertension  His blood pressure remains mildly elevated.  He will keep an eye on it as he increases his metoprolol dose.        This letter was generated using speech recognition software. Please excuse any typographical errors.       Alan Su MD  12/16/2024                          [1]   Current Outpatient Medications   Medication Sig Dispense Refill   • amLODIPine (NORVASC) 5 mg tablet Take 5 mg by mouth daily.     • amoxicillin (AMOXIL) 500 mg capsule TAKE 4 CAPSULES BY MOUTH 1 HOUR PRIOR TO APPOINTMENT     • apixaban (ELIQUIS) 5 mg tablet Take 5 mg by mouth 2 (two) times a day.     • cholecalciferol, vitD3,/vit K2 (VITAMIN D3-VITAMIN K2 ORAL) Take by mouth daily.     • coenzyme Q10 (COQ10) 200 mg capsule Take 200 mg by mouth daily.       • latanoprost (XALATAN) 0.005 % ophthalmic solution Administer 1 drop into both eyes nightly.  6   • lisinopriL 40 mg tablet Take 40 mg by mouth daily.     • metoprolol succinate XL (TOPROL-XL) 100 mg 24 hr tablet Take 1 tablet (100 mg total) by mouth 2 (two) times a day.     • rosuvastatin (CRESTOR) 10 mg tablet Take 10 mg by mouth daily.     • vitamin B complex (B COMPLEX ORAL) Take by mouth daily.     • white petrolatum-mineral oiL (LUBRIFRESH PM) 83-15 % ointment Apply to affected eye(s) nightly.     • ZINC ORAL Take by mouth daily.       No current facility-administered medications for this visit.   [2]   Social History  Socioeconomic History   • Marital status:      Spouse name: None   • Number of children: None   • Years of education: None   • Highest education level: None   Tobacco Use   • Smoking status: Former   • Smokeless tobacco: Former   • Tobacco comments:     Pt Quit Smoking over 40 years ago    Vaping Use   • Vaping status: Never Used   Substance and Sexual Activity   • Alcohol use: Yes     Comment: Occassionally    • Drug use: No   • Sexual activity: Defer     Social Drivers  of Health     Food Insecurity: No Food Insecurity (7/29/2023)    Hunger Vital Sign    • Worried About Running Out of Food in the Last Year: Never true    • Ran Out of Food in the Last Year: Never true   Transportation Needs: No Transportation Needs (7/30/2023)    PRAPARE - Transportation    • Lack of Transportation (Medical): No    • Lack of Transportation (Non-Medical): No   Housing Stability: Low Risk  (7/30/2023)    Housing Stability Vital Sign    • Unable to Pay for Housing in the Last Year: No    • Number of Places Lived in the Last Year: 1    • Unstable Housing in the Last Year: No

## 2024-12-16 NOTE — ASSESSMENT & PLAN NOTE
He had some form of SVT in 2019.  He underwent extensive catheter ablation targeted atrial fibrillation, atrial flutter and frequent PVCs.  There is no evidence of either atrial tachycardia or some other reentrant SVT.

## 2024-12-16 NOTE — ASSESSMENT & PLAN NOTE
He has a history of paroxysmal atrial fibrillation and underwent extensive ablation in 2019 with pulmonary vein isolation, multiple left atrial lines and a PVC ablation at the same setting.  He suffered phrenic nerve damage from that and it took him almost a year before recovering.  He now remains entirely asymptomatic with evidence that his atrial fibrillation is intermittent along with episodes of atypical atrial flutter.  Today he appears to be in atrial fibrillation with rapid ventricular response.  He checks his heart rate and blood pressure at least once a day and heart rate has been persistently elevated for at least a month.    At this point, I still plan to aim for rate control.  I will increase his metoprolol to 100 mg twice daily.  He will continue monitoring his heart rate and blood pressure and if the heart rate does not come down significantly we will consider cardioversion and antiarrhythmic drug therapy.  I am hesitant to offer him a repeat catheter ablation in the absence of symptoms and with complications related to his prior procedure.

## 2024-12-17 ENCOUNTER — TELEPHONE (OUTPATIENT)
Dept: SCHEDULING | Facility: CLINIC | Age: 79
End: 2024-12-17
Payer: COMMERCIAL

## 2024-12-17 NOTE — TELEPHONE ENCOUNTER
Jaye calling to speak with Britany about a new medication that is being recommended. Jaye prefers to let britany know the name of med Please advise [phone]

## 2024-12-19 NOTE — TELEPHONE ENCOUNTER
Spoke with Jaye.  She is reluctant to increase his Toprol to 200 mg daily.  I advised take the 100 mg in the morning.  If his heart rate is greater than 120 she can take an extra 50 at that time.

## 2025-03-10 NOTE — PROGRESS NOTES
3/11/2025      Dakotah Villalba MD  [address]  [place] [address]    Manuel Nichole MD Esberg, Douglas B, MD     Re:  TOBIAS LITTLE  :  1945      Dear Doctors:    It was my pleasure to see Tobias Little in the cardiovascular office today.  We will follow Tobias for  subclinical coronary artery disease with an Agatston score of 648 with two vessel involvement, hypertension and polygenic hypercholesterolemia.    He  underwent right total knee replacement  at the [facility].  This was complicated by postoperative paroxysmal SVT.  He was discharged on flecainide 100 mg twice daily and Toprol- mg daily.  Ultimately he underwent a [employer] loop recorder and a subsequent ablation for atrial tachyarrhythmias. 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  He is now in persistent rate controlled atrial flutter and on a combination of Toprol-XL and Eliquis.     Tobias reported to this office after visiting his ophthalmologist.  Apparently he has had bleeding involving his right and left eyes. Workup was negative. This led to a rediscussion of his atrial flutter and anticoagulation.  He remains on Eliquis and Toprol-XL.    We reviewed his prevention program in detail.  His last lipid panel is noted below and is excellent.  Presently, he is on Crestor 10 mg daily.     There is evidence of insulin resistance.  We recommended a weight reduction diet.  His goal weight is 220 pounds.  His present weight is 232 pounds.  We discussed a low-carbohydrate, low-saturated fat Mediterranean Diet.    He was noticing some dyspnea on exertion in late .  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  He is in chronic atrial flutter with a baseline heart rate of 80.  We  scheduled him for stress echo was negative for ischemia. We had him undergo a PET scan to rule out ischemia 2024.  His scan showed a moderate size reversible  defect in the mid to apical anteroseptal walls consistent with ischemia.  Due to this result he was scheduled for cardiac catheterization which he had performed on 1/31/2024.  Cardiac catheterization demonstrated patent coronary arteries except for the distal LAD had a 40% lesion.  CT of the chest demonstrated interstitial fibrosis.  Medical management is indicated.    Presently Tobias is denying any chest pain or shortness of breath.  He is demonstrating cardiovascular stability.        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  Subsequent phrenic nerve injury    Hernia repair  07/2021    inguinal hernia's   (X2)    iFR - initial vessel N/A 1/31/2024    [place] [facility] [place] [facility] [place]    Knee surgery Right 07/2019    infection    LEFT HEART CATH WITH CORONARY ANGIOGRAPHY N/A 1/31/2024    Performed by Marcy Hamm MD [place] [facility]    Replacement total knee Right 02/2019    Subdural hematoma evacuation via craniotomy      Neurosurgery for subdural hematoma      CURRENT MEDICATIONS:   Current Outpatient Medications   Medication Instructions    amLODIPine (NORVASC) 5 mg, Daily     amoxicillin (AMOXIL) 500 mg capsule TAKE 4 CAPSULES BY MOUTH 1 HOUR PRIOR TO APPOINTMENT    apixaban (ELIQUIS) 5 mg, 2 times daily    cholecalciferol, vitD3,/vit K2 (VITAMIN D3-VITAMIN K2 ORAL) Daily    coenzyme Q10 (COQ10) 200 mg, Daily    latanoprost (XALATAN) 0.005 % ophthalmic solution 1 drop, Nightly    lisinopriL (PRINIVIL) 40 mg, Daily    metoprolol succinate XL (TOPROL-XL) 100 mg, Daily    rosuvastatin (CRESTOR) 10 mg, Daily    vitamin B complex (B COMPLEX ORAL) Daily    white petrolatum-mineral oiL (LUBRIFRESH PM) 83-15 % ointment Nightly    ZINC ORAL Daily        SUPPLEMENTS:  Coenzyme-Q10 100 mg daily, vitamin-D 9578-3165 international units daily, omega-3 fish oil 1000 mg daily, multiple vitamin.    ALLERGIES:  No known drug allergies.    FAMILY HISTORY:  Father  young at age 55 of lung cancer.  Mother has a pacemaker.    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  He is retired.  His wife's name is Jaye.  They have three children.  He has seven grandchildren.  He smoked 38 years ago for five years.  He rarely drinks alcohol.  He has an active lifestyle.  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    REVIEW OF SYSTEMS: Tobias is now in persistent atrial flutter with rate control.    PHYSICAL EXAMINATION:  Vitals:    25 1006   BP: 114/80   Pulse: 88   Resp: 16   SpO2: 98%         Wt Readings from Last 3 Encounters:   25 105 kg (232 lb 6.4 oz)   24 106 kg (233 lb)   24 105 kg (231 lb)     BP Readings from Last 3 Encounters:   25 114/80   24 (!) 140/66   24 (!) 134/94     Cardiovascular ROS: no chest pain or dyspnea on exertion     LABORATORY DATA:      EKG:  Flutter rate 80    Coronary calcium score 2016:  648 with two vessel involvement.      Stress echo was negative for myocardial ischemia or scar, normal left ventricular size, preserved systolic function.    Myocardial perfusion  scan February 2019: Normal    Echocardiogram February 2019: Normal left ventricular size preserved function.  Mild mitral and tricuspid regurgitation.  Ascending aorta measured 4.2 cm.    Echocardiogram September 2021:   The left ventricle is normal in size. Normal left ventricular wall thickness. There are no segmental wall motion abnormalities. Normal left ventricular ejection fraction. The left ventricular ejection fraction by visual estimate is 65% to 70%.   Unable to assess LV diastolic function due to arrhythmia.   The right ventricle is normal in size. There is normal function of the right ventricle.   There is no mitral regurgitation. There is no mitral stenosis.   There is no aortic stenosis. There is mild aortic regurgitation.   There is trace tricuspid regurgitation.   The aortic root is mildly enlarged. The aorta measures 4.2 cm at the sinus of Valsalva. The proximal segment of the ascending aorta is mildly enlarged. The proximal segment of the ascending aorta measures 4.0 cm.    CT of the chest: 2023:  FINDINGS:  Lung fields: Stable pattern of subpleural interstitial reticulation/fibrosis  with minimal stable areas of developing 'honeycombing'.  No change in  9 mm  pleural-based right lower lobe nodular density (image 75).  No definite new or  enlarging nodules appreciated.  The central airways appear patent.     Mediastinum and shoaib: Stable scattered small nodes not grossly changed since  previous examination.     Heart and pericardium: Stable mild cardiomegaly.  No pericardial effusion.  There are coronary arterial calcifications.     Axilla: No axillary adenopathy..     Chest wall and pleura: No pleural effusion.     Upper abdomen: Partially imaged left upper pole renal cyst.  Stable tiny  nonobstructing right renal calcification.     IMPRESSION: Stable examination.  Grossly stable pattern of interstitial lung  disease.   Stable 9 mm pleural-based nodular density right lung base.    CT of the chest  2024:  1. Stable peripheral lung fibrosis with areas of honeycombing. Stable likely benign lung nodules. No new lung nodule or mass.   2. Stable ascending thoracic aortic aneurysm.  4.1 cm.  3. Mild precarinal lymphadenopathy.   4. Probable bilateral renal cysts. Small nonobstructing right renal calculus.     Lipid panel:   Component      Latest Ref Rng 12/29/2021 7/21/2022 9/18/2023 8/29/2024   Triglycerides      <150 mg/dL 80  45  78  48    Cholesterol      <=200 mg/dL 149  152  137  134    HDL      >=40 mg/dL 62  62  60  66    LDL Calculated      <=100 mg/dL 71  81  61  58    Non-HDL, Calculated      mg/dL 87  90  77  68    RISK      <=5.0  2.4  2.5  2.3  2.0          The 10-year ASCVD risk score (Emilia SESAY, et al., 2019) is: 22.5%    Values used to calculate the score:      Age: 79 years      Sex: Male      Is Non- : No      Diabetic: No      Tobacco smoker: No      Systolic Blood Pressure: 114 mmHg      Is BP treated: Yes      HDL Cholesterol: 66 mg/dL      Total Cholesterol: 134 mg/dL     IMPRESSIONS/RECOMMENDATIONS:    1. Coronary artery disease.   He is not on aspirin as he is on Eliquis.  PET scan showed a reversible defect.  Cardiac catheterization showed 40% distal LAD lesion otherwise patent coronary arteries.  We will continue optimal medical management.    2. Hypertension.  Continue Toprol-XL and lisinopril and amlodipine.    3. Polygenic hypercholesterolemia.  The patient will continue his Crestor  10 mg daily.  Our goal is 58.  Needs repeat lipid panel.    4. MTHFR polymorphism.  Continue B-complex with L5 methyl folate.    5. Insulin resistance.  Continue low carbohydrate low saturated fat Mediterranean diet and exercise program.    6. Obesity.   We discussed a low-carbohydrate, low-saturated fat Mediterranean Diet and an exercise walking program.  His goal weight is  220 pounds.    7.         Persistent atrial flutter.  Status post A. fib ablation complicated by phrenic  nerve paralysis.  Patient is now resigned to rate control and anticoagulation for his atrial flutter.  He is followed by Dr. Su who does not feel he will benefit from ablation of his atrial flutter.  He should remain on Eliquis.    8.         History of subdural hematoma.  This was secondary to head trauma and was not spontaneous.  He is on Eliquis now for his atrial flutter.    9.         History of phrenic nerve injury with diaphragmatic paralysis.  Resolved.    10.       History of total knee replacement.  Followed [facility].  He is status post left total knee replacement 2024.    11.       History of cold agglutinins.  Followed [facility].    12.       Chronic kidney disease stage IIIa.  Patient's recent creatinine is 1.1 with a GFR of 60      13.       Dilated aortic root.  The patient's recent echocardiogram demonstrated mild dilatation of the sinus of Valsalva at 4.2 cm and ascending thoracic aorta at 4.0 cm.  A CT angiogram of the chest 2024 demonstrated normal aortic root at 4.1 cm.    14.       Dyspnea on exertion.  The patient does have a history of phrenic nerve paralysis as well as chronic atrial flutter with controlled ventricular response.  He also has chronic stable interstitial lung fibrosis with honeycombing.    Summary:.      Tobias is demonstrating cardiovascular stability.      We reviewed his cardiac history.  He now has chronic atrial flutter rate controlled with Toprol-XL and anticoagulated with Eliquis.      His blood pressures been reasonably well controlled with the use of amlodipine and lisinopril.    He was counseled on low carbohydrate low saturated fat Mediterranean diet.      This was a 30 minute patient encounter with greater than 50% time spent in care coordination and counseling.       Sincerely,    Manuel Nichole MD    03/11/25

## 2025-03-11 ENCOUNTER — OFFICE VISIT (OUTPATIENT)
Dept: CARDIOLOGY | Facility: CLINIC | Age: 80
End: 2025-03-11
Payer: COMMERCIAL

## 2025-03-11 ENCOUNTER — APPOINTMENT (OUTPATIENT)
Dept: LAB | Facility: CLINIC | Age: 80
End: 2025-03-11
Attending: INTERNAL MEDICINE
Payer: COMMERCIAL

## 2025-03-11 VITALS
HEIGHT: 70 IN | SYSTOLIC BLOOD PRESSURE: 114 MMHG | RESPIRATION RATE: 16 BRPM | BODY MASS INDEX: 33.27 KG/M2 | HEART RATE: 88 BPM | OXYGEN SATURATION: 98 % | WEIGHT: 232.4 LBS | DIASTOLIC BLOOD PRESSURE: 80 MMHG

## 2025-03-11 DIAGNOSIS — I25.10 CORONARY ARTERY DISEASE INVOLVING NATIVE CORONARY ARTERY OF NATIVE HEART WITHOUT ANGINA PECTORIS: Primary | ICD-10-CM

## 2025-03-11 DIAGNOSIS — R35.1 BPH ASSOCIATED WITH NOCTURIA: ICD-10-CM

## 2025-03-11 DIAGNOSIS — I25.10 CORONARY ARTERY DISEASE INVOLVING NATIVE CORONARY ARTERY OF NATIVE HEART WITHOUT ANGINA PECTORIS: ICD-10-CM

## 2025-03-11 DIAGNOSIS — N40.1 BPH ASSOCIATED WITH NOCTURIA: ICD-10-CM

## 2025-03-11 LAB
ALBUMIN SERPL-MCNC: 4.3 G/DL (ref 3.5–5.7)
ALP SERPL-CCNC: 66 IU/L (ref 34–125)
ALT SERPL-CCNC: 13 IU/L (ref 7–52)
ANION GAP SERPL CALC-SCNC: 8 MEQ/L (ref 3–15)
AST SERPL-CCNC: 17 IU/L (ref 13–39)
ATRIAL RATE: 227
BILIRUB SERPL-MCNC: 0.9 MG/DL (ref 0.3–1.2)
BUN SERPL-MCNC: 20 MG/DL (ref 7–25)
CALCIUM SERPL-MCNC: 9.5 MG/DL (ref 8.6–10.3)
CHLORIDE SERPL-SCNC: 101 MEQ/L (ref 98–107)
CHOLEST SERPL-MCNC: 141 MG/DL
CO2 SERPL-SCNC: 30 MEQ/L (ref 21–31)
CREAT SERPL-MCNC: 0.9 MG/DL (ref 0.7–1.3)
EGFRCR SERPLBLD CKD-EPI 2021: >60 ML/MIN/1.73M*2
GLUCOSE SERPL-MCNC: 92 MG/DL (ref 70–99)
HDLC SERPL-MCNC: 64 MG/DL
HDLC SERPL: 2.2 {RATIO}
LDLC SERPL CALC-MCNC: 63 MG/DL
NONHDLC SERPL-MCNC: 77 MG/DL
P AXIS: 75
POTASSIUM SERPL-SCNC: 4.6 MEQ/L (ref 3.5–5.1)
PROT SERPL-MCNC: 7.2 G/DL (ref 6–8.2)
PSA SERPL-MCNC: 0.77 NG/ML
QRS DURATION: 96
QT INTERVAL: 378
QTC CALCULATION(BAZETT): 457
R AXIS: 72
SODIUM SERPL-SCNC: 139 MEQ/L (ref 136–145)
T WAVE AXIS: 31
TRIGL SERPL-MCNC: 72 MG/DL
VENTRICULAR RATE: 88

## 2025-03-11 PROCEDURE — 36415 COLL VENOUS BLD VENIPUNCTURE: CPT

## 2025-03-11 PROCEDURE — 3008F BODY MASS INDEX DOCD: CPT | Performed by: INTERNAL MEDICINE

## 2025-03-11 PROCEDURE — 84153 ASSAY OF PSA TOTAL: CPT

## 2025-03-11 PROCEDURE — 99214 OFFICE O/P EST MOD 30 MIN: CPT | Performed by: INTERNAL MEDICINE

## 2025-03-11 PROCEDURE — 80053 COMPREHEN METABOLIC PANEL: CPT

## 2025-03-11 PROCEDURE — 3074F SYST BP LT 130 MM HG: CPT | Performed by: INTERNAL MEDICINE

## 2025-03-11 PROCEDURE — 80061 LIPID PANEL: CPT

## 2025-03-11 PROCEDURE — 93000 ELECTROCARDIOGRAM COMPLETE: CPT | Performed by: INTERNAL MEDICINE

## 2025-03-11 PROCEDURE — 3079F DIAST BP 80-89 MM HG: CPT | Performed by: INTERNAL MEDICINE

## 2025-03-19 ENCOUNTER — TELEPHONE (OUTPATIENT)
Dept: SCHEDULING | Facility: CLINIC | Age: 80
End: 2025-03-19
Payer: COMMERCIAL

## 2025-03-19 NOTE — TELEPHONE ENCOUNTER
Called patient he states there was something that said there was an issue with his thyroid. No thyroid panel drawn labs look good he will message through [employer] if he can find it copy and paste message

## 2025-03-19 NOTE — TELEPHONE ENCOUNTER
Dr Nichole patient has a question re his lab results dated 3/11/2025.    Please call him at [phone] to discuss. ty

## 2025-03-26 ENCOUNTER — TELEPHONE (OUTPATIENT)
Dept: SCHEDULING | Facility: CLINIC | Age: 80
End: 2025-03-26

## 2025-03-26 NOTE — TELEPHONE ENCOUNTER
Perla calling from [facility] re cc form re to  colonoscopy on 4/2/25. Form was faxed to office.  Call back  number [phone] ext [phone]    FAX: [phone]

## 2025-04-02 ENCOUNTER — ANESTHESIA EVENT (OUTPATIENT)
Dept: ENDOSCOPY | Facility: HOSPITAL | Age: 80
End: 2025-04-02
Payer: COMMERCIAL

## 2025-04-02 ENCOUNTER — HOSPITAL ENCOUNTER (OUTPATIENT)
Facility: HOSPITAL | Age: 80
Discharge: HOME | End: 2025-04-02
Attending: INTERNAL MEDICINE | Admitting: INTERNAL MEDICINE
Payer: COMMERCIAL

## 2025-04-02 ENCOUNTER — ANESTHESIA (OUTPATIENT)
Dept: ENDOSCOPY | Facility: HOSPITAL | Age: 80
End: 2025-04-02
Payer: COMMERCIAL

## 2025-04-02 VITALS
RESPIRATION RATE: 16 BRPM | HEIGHT: 70 IN | DIASTOLIC BLOOD PRESSURE: 86 MMHG | HEART RATE: 88 BPM | TEMPERATURE: 99 F | WEIGHT: 225 LBS | SYSTOLIC BLOOD PRESSURE: 147 MMHG | OXYGEN SATURATION: 99 % | BODY MASS INDEX: 32.21 KG/M2

## 2025-04-02 DIAGNOSIS — Z12.11 SCREENING FOR COLON CANCER: ICD-10-CM

## 2025-04-02 PROCEDURE — 71000011 HC PACU PHASE 1 EA ADDL MIN: Performed by: INTERNAL MEDICINE

## 2025-04-02 PROCEDURE — 71000001 HC PACU PHASE 1 INITIAL 30MIN: Performed by: INTERNAL MEDICINE

## 2025-04-02 PROCEDURE — 63600000 HC DRUGS/DETAIL CODE: Mod: JZ | Performed by: INTERNAL MEDICINE

## 2025-04-02 PROCEDURE — 0DBK8ZX EXCISION OF ASCENDING COLON, VIA NATURAL OR ARTIFICIAL OPENING ENDOSCOPIC, DIAGNOSTIC: ICD-10-PCS | Performed by: INTERNAL MEDICINE

## 2025-04-02 PROCEDURE — 37000002 HC ANESTHESIA MAC: Performed by: INTERNAL MEDICINE

## 2025-04-02 PROCEDURE — 88305 TISSUE EXAM BY PATHOLOGIST: CPT | Performed by: INTERNAL MEDICINE

## 2025-04-02 PROCEDURE — 45380 COLONOSCOPY AND BIOPSY: CPT | Performed by: INTERNAL MEDICINE

## 2025-04-02 PROCEDURE — 25800000 HC PHARMACY IV SOLUTIONS: Performed by: ANESTHESIOLOGY

## 2025-04-02 PROCEDURE — 63600000 HC DRUGS/DETAIL CODE: Mod: JW | Performed by: ANESTHESIOLOGY

## 2025-04-02 RX ORDER — PROPOFOL 200MG/20ML
SYRINGE (ML) INTRAVENOUS AS NEEDED
Status: DISCONTINUED | OUTPATIENT
Start: 2025-04-02 | End: 2025-04-02 | Stop reason: SURG

## 2025-04-02 RX ORDER — SODIUM CHLORIDE 9 MG/ML
INJECTION, SOLUTION INTRAVENOUS CONTINUOUS PRN
Status: DISCONTINUED | OUTPATIENT
Start: 2025-04-02 | End: 2025-04-02 | Stop reason: SURG

## 2025-04-02 RX ORDER — GENTAMICIN SULFATE 80 MG/50ML
1 INJECTION, SOLUTION INTRAVENOUS ONCE
Status: COMPLETED | OUTPATIENT
Start: 2025-04-02 | End: 2025-04-02

## 2025-04-02 RX ORDER — PROPOFOL 10 MG/ML
INJECTION, EMULSION INTRAVENOUS CONTINUOUS PRN
Status: DISCONTINUED | OUTPATIENT
Start: 2025-04-02 | End: 2025-04-02 | Stop reason: SURG

## 2025-04-02 RX ADMIN — PROPOFOL 100 MCG/KG/MIN: 10 INJECTION, EMULSION INTRAVENOUS at 13:51

## 2025-04-02 RX ADMIN — GENTAMICIN SULFATE 80 MG: 80 INJECTION, SOLUTION INTRAVENOUS at 13:10

## 2025-04-02 RX ADMIN — SODIUM CHLORIDE: 9 INJECTION, SOLUTION INTRAVENOUS at 13:46

## 2025-04-02 RX ADMIN — PROPOFOL 80 MG: 10 INJECTION, EMULSION INTRAVENOUS at 13:51

## 2025-04-02 ASSESSMENT — ENCOUNTER SYMPTOMS: DYSRHYTHMIAS: 1

## 2025-04-02 NOTE — PERIOPERATIVE NURSING NOTE
Pt awake and alert VSS  PT has steady gait.  Discharge instructions reviewed with PT.  Pt verbalized an understanding of the d/c instructions  Pt transported to [facility] in wheelchair with transport team.

## 2025-04-02 NOTE — ANESTHESIOLOGIST PRE-PROCEDURE ATTESTATION
Pre-Procedure Patient Identification:  I am the Primary Anesthesiologist and have identified the patient on 04/02/25 at 1:45 PM.   I have confirmed the procedure(s) will be performed by the following surgeon/proceduralist Manuel Thorpe MD.

## 2025-04-02 NOTE — OP NOTE
_______________________________________________________________________________  Patient Name: Tobias Little           Procedure Date: 4/2/2025 1:42 PM  MRN: [id number]                     Account Number: [id number]  YOB: 1945              Age: 79  Gender: Male                          Note Status: Finalized  Attending MD: GODFREY PORTILLO MD~BANDAR,  _______________________________________________________________________________  Procedure:             Colonoscopy  Indications:           Screening for colorectal malignant neoplasm  Providers:             GODFREY PORTILLO MD~BANDAR (Doctor), Crystal Yen,  Staff Nurse (Nurse), Mckenzie Rivero (Nurse)  Referring MD:          PALMER HERNANDEZ MD  Requesting Provider:  Medicines:             Monitored Anesthesia Care  Complications:         No immediate complications. Estimated blood loss:  Minimal.  _______________________________________________________________________________  Procedure:             After I obtained informed consent, the scope was  passed under direct vision. Throughout the procedure,  the patient's blood pressure, pulse, and oxygen  saturations were monitored continuously. The  colonoscope was introduced through the anus and  advanced to the cecum, identified by appendiceal  orifice and ileocecal valve. The colonoscopy was  performed without difficulty. The patient tolerated  the procedure well. The quality of the bowel  preparation was good.  Estimated Blood Loss:  Estimated blood loss was minimal.  Findings:  The perianal and digital rectal examinations were normal.  Multiple large-mouthed and small-mouthed diverticula were found in the  entire colon.  A 3 mm polyp was found in the ascending colon. The polyp was sessile.  The polyp was removed with a cold biopsy forceps. Resection and  retrieval were complete.  The exam was otherwise without abnormality on direct and retroflexion  views.  Impression:            -  Diverticulosis in the entire examined colon.  - One 3 mm polyp in the ascending colon, removed with  a cold biopsy forceps. Resected and retrieved.  - The examination was otherwise normal on direct and  retroflexion views.  Recommendation:        - Await pathology results.  - High fiber diet.  - No plan fort repeat colonoscopy due to age.  Procedure Code(s):     --- Professional ---  50850, Colonoscopy, flexible; with biopsy, single or  multiple  Diagnosis Code(s):     --- Professional ---  Z12.11, Encounter for screening for malignant neoplasm  of colon  D12.2, Benign neoplasm of ascending colon  K57.30, Diverticulosis of large intestine without  perforation or abscess without bleeding  CPT copyright 2023 American Medical Association. All rights reserved.  The codes documented in this report are preliminary and upon  review may  be revised to meet current compliance requirements.  ____________________________  GODFREY PORTILLO MD~BANDAR  4/2/2025 2:07:35 PM  This report has been signed electronically.  Number of Addenda: 0  Note Initiated On: 4/2/2025 1:42 PM

## 2025-04-02 NOTE — H&P
Inpatient History & Physical     Admitting Diagnosis: Screening for colon cancer [Z12.11]    HPI  Patient is a 79 y.o. male  who presents with CRC screening.     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  Subsequent phrenic nerve injury    Hernia repair  07/2021    inguinal hernia's   (X2)    iFR - initial vessel N/A 1/31/2024    [place] [facility] [place]    Knee surgery Right 07/2019    infection    LEFT HEART CATH WITH CORONARY ANGIOGRAPHY N/A 1/31/2024    [place] [facility] [place]    Replacement total knee Right 02/2019    Subdural hematoma evacuation via craniotomy      Neurosurgery for subdural hematoma     Total knee arthroplasty Left        Social History     Socioeconomic History    Marital status:      Spouse name: None    Number of children: None    Years of education: None    Highest education level: None   Tobacco Use    Smoking status: Former    Smokeless tobacco: Former    Tobacco comments:     Pt Quit Smoking over 40 years ago    Vaping Use    Vaping status: Never Used   Substance and Sexual Activity    Alcohol use: Yes  "    Comment: Occassionally     Drug use: No    Sexual activity: Defer     Social Drivers of Health     Food Insecurity: No Food Insecurity (7/29/2023)    Hunger Vital Sign     Worried About Running Out of Food in the Last Year: Never true     Ran Out of Food in the Last Year: Never true   Transportation Needs: No Transportation Needs (7/30/2023)    PRAPARE - Transportation     Lack of Transportation (Medical): No     Lack of Transportation (Non-Medical): No   Housing Stability: Low Risk  (7/30/2023)    Housing Stability Vital Sign     Unable to Pay for Housing in the Last Year: No     Number of Places Lived in the Last Year: 1     Unstable Housing in the Last Year: No       Family History:  Family History   Problem Relation Name Age of Onset    Heart disease Biological Mother      Lung cancer Biological Father         Allergies: Patient has no known allergies.    Current Medications[1]    Review of Systems  All other systems reviewed and negative except as noted in the HPI.    Objective     Vital Signs for the last 24 hours:  Temp:  [36.6 °C (97.9 °F)] 36.6 °C (97.9 °F)  Heart Rate:  [97] 97  Resp:  [16] 16  BP: (136)/(76) 136/76    Visit Vitals  /76   Pulse 97   Temp 36.6 °C (97.9 °F) (Temporal)   Resp 16   Ht 1.778 m (5' 10\")   Wt 102 kg (225 lb)   SpO2 98%   BMI 32.28 kg/m²       General Appearance:    Alert, cooperative, no distress, appears stated age   Head:    Normocephalic, without obvious abnormality, atraumatic   Eyes:    PERRL, conjunctiva/corneas clear, EOM's intact, fundi     benign, both eyes        Ears:    Normal TM's and external ear canals, both ears   Nose:   Nares normal, septum midline, mucosa normal, no drainage    or sinus   tenderness   Throat:   Lips, mucosa, and tongue normal; teeth and gums normal   Neck:   Supple, symmetrical, trachea midline, no adenopathy;        thyroid:  No enlargement/tenderness/nodules; no carotid    bruit or JVD   Back:     Symmetric, no curvature, ROM " normal, no CVA tenderness   Lungs:     Clear to auscultation bilaterally, respirations unlabored   Chest wall:    No tenderness or deformity   Heart:    Regular rate and rhythm, S1 and S2 normal, no murmur, rub   or gallop   Abdomen:     Soft, non-tender, bowel sounds active all four quadrants,     no masses, no organomegaly           Extremities:  Musculoskeletal:   Extremities normal, atraumatic, no cyanosis or edema    No injury or deformity   Pulses:   2+ and symmetric all extremities   Skin:   Skin color, texture, turgor normal, no rashes or lesions   Lymph nodes:   Cervical, supraclavicular, and axillary nodes normal   Neurologic:    Behavior/  Emotional:   CNII-XII intact. Normal strength, sensation and reflexes       throughout    Appropriate, cooperative       Labs   No new labs.    Imaging  Not applicable    ECG/Telemetry  I have independently reviewed the telemetry. No events for the last 24 hours.    Assessment/Plan     CRC screening- will do colonoscopy.    VTE Assessment: Padua      Code Status: Prior  Estimated discharge date:              [1]   Current Inpatient Medications   Medication Dose Route Frequency Provider Last Rate Last Admin    gentamicin (GARAMYCIN) IVPB 80 mg  1 mg/kg (Adjusted) intravenous Once Manuel Thorpe MD

## 2025-04-02 NOTE — OR SURGEON
Pre-Procedure patient identification:  I am the primary operating surgeon/proceduralist and I have reviewed the applicable pathology reports and radiology studies for this procedure. I have identified the patient and confirmed laterality is left on 04/02/25 at 12:51 PM Manuel Thorpe MD  Phone Number: [phone]

## 2025-04-02 NOTE — ANESTHESIA PREPROCEDURE EVALUATION
Relevant Problems   CARDIOVASCULAR   (+) Atypical atrial flutter (CMS/HCC)   (+) CAD (coronary artery disease)   (+) Paroxysmal atrial fibrillation (CMS/HCC)   (+) Primary hypertension   (+) SVT (supraventricular tachycardia)      HEMATOLOGY   (+) Cold agglutinin disease (CMS/HCC)      MUSCULOSKELETAL   (+) Primary osteoarthritis of both knees       Anesthesia ROS/MED HX      Cardiovascular   CAD   hypertension  Dysrhythmias and atrial fibrillation   Cardiac clearance reviewed, echocardiogram reviewed and ECG reviewed  Endo/Other   Diabetes  Body Habitus: Overweight  ROS/MED HX Comments:    Cardiology: 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  He is now in persistent rate controlled atrial flutter and on a combination of Toprol-XL and Eliquis.    ECG: Atrial flutter with variable A-V block   Abnormal ECG   When compared with ECG of 16-DEC-2024 14:54,   Atrial flutter has replaced Atrial fibrillation   ST no longer depressed in Inferior leads   Non-specific change in ST segment in Anterior leads   Confirmed by Manuel Nichole (23) on 3/11/2025 10:27:05 AM        Past Surgical History   Procedure Laterality Date    Appendectomy      Cardiac electrophysiology study and ablation  05/2019    Atrial fibrillation: PVI, roof, lateral mitral isthmus, CFAE, [place]I and [place] [facility] [place].  Subsequent phrenic nerve injury    Hernia repair  07/2021    inguinal hernia's   (X2)    iFR - initial vessel N/A 1/31/2024    [place] [facility] [place]    Knee surgery Right 07/2019    infection    LEFT HEART CATH WITH CORONARY ANGIOGRAPHY N/A 1/31/2024    [place] [facility] [place]    Replacement total knee Right 02/2019    Subdural hematoma evacuation via craniotomy      Neurosurgery for subdural hematoma     Total knee arthroplasty Left        Physical Exam    Airway   Mallampati: II   TM distance: >3 FB   Neck ROM: full  Cardiovascular -  normal   Rhythm: regular   Rate: normalPulmonary - normal   clear to auscultation  Dental - normal    Anesthesia Plan    Plan: MAC    Technique: MAC     Lines and Monitors: PIV     Airway: natural airway / supplemental oxygen    3 ASA  Blood Products:   Use of Blood Products Discussed: No   Anesthetic plan and risks discussed with: patient  Postop Plan:   Patient Disposition: phase II then home   Pain Management: IV analgesics

## 2025-04-03 LAB
CASE RPRT: NORMAL
CLINICAL INFO: NORMAL
PATH REPORT.FINAL DX SPEC: NORMAL
PATH REPORT.GROSS SPEC: NORMAL

## 2025-04-04 NOTE — ANESTHESIA POSTPROCEDURE EVALUATION
Patient: Tobias Little    Procedure Summary       Date: 04/02/25 Room / Location: [facility] GI 2 / [facility]    Anesthesia Start: 1348 Anesthesia Stop: 1407    Procedure: Colonoscopy (Anus) Diagnosis:       Screening for colon cancer      (10 year Screening)    Providers: Manuel Thorpe MD Responsible Provider: Radha Carmichael MD    Anesthesia Type: MAC ASA Status: 3            Anesthesia Type: MAC  PACU Vitals  4/2/2025 1405 - 4/2/2025 1505        4/2/2025  1406 4/2/2025  1420 4/2/2025  1436       BP: 101/70 121/74 147/86     Temp: 37.2 °C (99 °F) -- --     Pulse: 84 87 88     Resp: 16 16 16     SpO2: -- 98 % 99 %               Anesthesia Post Evaluation    Pain management: adequate  Patient location during evaluation: PACU  Patient participation: complete - patient participated  Level of consciousness: awake and alert  Cardiovascular status: acceptable  Airway Patency: adequate  Respiratory status: acceptable  Hydration status: acceptable  Anesthetic complications: no

## 2025-04-11 ENCOUNTER — TELEPHONE (OUTPATIENT)
Dept: CARDIOLOGY | Facility: CLINIC | Age: 80
End: 2025-04-11
Payer: COMMERCIAL

## 2025-04-11 NOTE — TELEPHONE ENCOUNTER
Spoke to patient.  Patient stated that he is seeking EP service elsewhere and would not reschedule.

## 2025-05-20 ENCOUNTER — OFFICE VISIT (OUTPATIENT)
Dept: PRIMARY CARE | Facility: CLINIC | Age: 80
End: 2025-05-20
Payer: COMMERCIAL

## 2025-05-20 VITALS
TEMPERATURE: 97.9 F | WEIGHT: 224 LBS | HEIGHT: 69 IN | OXYGEN SATURATION: 97 % | SYSTOLIC BLOOD PRESSURE: 120 MMHG | HEART RATE: 86 BPM | BODY MASS INDEX: 33.18 KG/M2 | DIASTOLIC BLOOD PRESSURE: 82 MMHG

## 2025-05-20 DIAGNOSIS — D59.12 COLD AGGLUTININ DISEASE (CMS/HCC): ICD-10-CM

## 2025-05-20 DIAGNOSIS — J84.10 PULMONARY FIBROSIS (CMS/HCC): ICD-10-CM

## 2025-05-20 DIAGNOSIS — I48.0 PAROXYSMAL ATRIAL FIBRILLATION (CMS/HCC): ICD-10-CM

## 2025-05-20 DIAGNOSIS — D68.69 SECONDARY HYPERCOAGULABLE STATE (CMS/HCC): ICD-10-CM

## 2025-05-20 DIAGNOSIS — I48.4 ATYPICAL ATRIAL FLUTTER (CMS/HCC): Primary | ICD-10-CM

## 2025-05-20 DIAGNOSIS — I77.810 DILATED AORTIC ROOT (CMS/HCC): ICD-10-CM

## 2025-05-20 DIAGNOSIS — I10 PRIMARY HYPERTENSION: ICD-10-CM

## 2025-05-20 DIAGNOSIS — E72.12 HOMOZYGOUS FOR C677T POLYMORPHISM OF MTHFR (CMS/HCC): ICD-10-CM

## 2025-05-20 DIAGNOSIS — I25.10 CORONARY ARTERY DISEASE INVOLVING NATIVE CORONARY ARTERY OF NATIVE HEART WITHOUT ANGINA PECTORIS: ICD-10-CM

## 2025-05-20 PROCEDURE — G2211 COMPLEX E/M VISIT ADD ON: HCPCS | Performed by: FAMILY MEDICINE

## 2025-05-20 PROCEDURE — 99214 OFFICE O/P EST MOD 30 MIN: CPT | Performed by: FAMILY MEDICINE

## 2025-05-20 PROCEDURE — 3079F DIAST BP 80-89 MM HG: CPT | Performed by: FAMILY MEDICINE

## 2025-05-20 PROCEDURE — 3074F SYST BP LT 130 MM HG: CPT | Performed by: FAMILY MEDICINE

## 2025-05-20 PROCEDURE — 3008F BODY MASS INDEX DOCD: CPT | Performed by: FAMILY MEDICINE

## 2025-05-20 RX ORDER — PSYLLIUM HUSK 0.4 G
1 CAPSULE ORAL
COMMUNITY

## 2025-06-04 ENCOUNTER — DOCUMENTATION (OUTPATIENT)
Dept: PRIMARY CARE | Facility: CLINIC | Age: 80
End: 2025-06-04
Payer: COMMERCIAL

## 2025-06-04 NOTE — PROGRESS NOTES
Mary Thakur MA has completed a chart review for Tobias Little and have determined that the care gap has been satisfied.    Care Gap Source: [facility]    Care Gap(s) Identified: Annual Wellness Visit    Chart Review Completed: Yes      Edited appointment note. Patient is due for MAW

## 2025-08-25 ENCOUNTER — OFFICE VISIT (OUTPATIENT)
Dept: THORACIC SURGERY | Facility: CLINIC | Age: 80
End: 2025-08-25
Payer: COMMERCIAL

## 2025-08-25 VITALS
OXYGEN SATURATION: 100 % | HEART RATE: 79 BPM | BODY MASS INDEX: 31.22 KG/M2 | HEIGHT: 71 IN | SYSTOLIC BLOOD PRESSURE: 144 MMHG | WEIGHT: 223 LBS | DIASTOLIC BLOOD PRESSURE: 96 MMHG

## 2025-08-25 DIAGNOSIS — R91.1 LUNG NODULE SEEN ON IMAGING STUDY: Primary | ICD-10-CM

## 2025-08-25 DIAGNOSIS — J84.10 PULMONARY FIBROSIS (CMS/HCC): ICD-10-CM

## 2025-08-25 PROCEDURE — 3080F DIAST BP >= 90 MM HG: CPT | Performed by: THORACIC SURGERY (CARDIOTHORACIC VASCULAR SURGERY)

## 2025-08-25 PROCEDURE — 3008F BODY MASS INDEX DOCD: CPT | Performed by: THORACIC SURGERY (CARDIOTHORACIC VASCULAR SURGERY)

## 2025-08-25 PROCEDURE — 3077F SYST BP >= 140 MM HG: CPT | Performed by: THORACIC SURGERY (CARDIOTHORACIC VASCULAR SURGERY)

## 2025-08-25 PROCEDURE — 99214 OFFICE O/P EST MOD 30 MIN: CPT | Performed by: THORACIC SURGERY (CARDIOTHORACIC VASCULAR SURGERY)

## 2025-08-29 ENCOUNTER — HOSPITAL ENCOUNTER (OUTPATIENT)
Dept: RADIOLOGY | Age: 80
Discharge: HOME | End: 2025-08-29
Attending: PHYSICIAN ASSISTANT
Payer: COMMERCIAL

## 2025-08-29 DIAGNOSIS — J84.10 PULMONARY FIBROSIS (CMS/HCC): ICD-10-CM

## 2025-08-29 DIAGNOSIS — R91.1 LUNG NODULE SEEN ON IMAGING STUDY: ICD-10-CM

## 2025-08-29 PROCEDURE — 71250 CT THORAX DX C-: CPT

## (undated) DEVICE — ***USE 121412***PACK DEVICE IMPLANT TLH

## (undated) DEVICE — NEEDLE PERCUTANEOUS ENTRY

## (undated) DEVICE — GUIDEWIRE DIAGNOSTIC 035-260 EXCHANGE

## (undated) DEVICE — CATH 6FR PIG 145 ANGLE

## (undated) DEVICE — GOWN SIRUS POLYRNF BRTHSLV LG 30/CS

## (undated) DEVICE — BIOGUARD VALVES

## (undated) DEVICE — TR BAND REGULAR

## (undated) DEVICE — BAG DECANTER

## (undated) DEVICE — CATH 6FR FL3.5

## (undated) DEVICE — FORCEP COLD RADIAL JAW

## (undated) DEVICE — CATH D 6F FR4 100CM

## (undated) DEVICE — CLEANSTART BEDSIDE KIT 500ML

## (undated) DEVICE — GUIDEWIRE PRESSURE OMNI WIRE JTIP 185CM

## (undated) DEVICE — KIT CATH LAB ANGIO

## (undated) DEVICE — GLIDESHEATH SLENDER SS (.021) 6FR 10CM